# Patient Record
Sex: FEMALE | Race: BLACK OR AFRICAN AMERICAN | NOT HISPANIC OR LATINO | URBAN - METROPOLITAN AREA
[De-identification: names, ages, dates, MRNs, and addresses within clinical notes are randomized per-mention and may not be internally consistent; named-entity substitution may affect disease eponyms.]

---

## 2019-09-01 ENCOUNTER — OUTPATIENT (OUTPATIENT)
Dept: OUTPATIENT SERVICES | Facility: HOSPITAL | Age: 79
LOS: 1 days | End: 2019-09-01

## 2019-09-01 DIAGNOSIS — Z98.49 CATARACT EXTRACTION STATUS, UNSPECIFIED EYE: Chronic | ICD-10-CM

## 2019-09-26 ENCOUNTER — TRANSCRIPTION ENCOUNTER (OUTPATIENT)
Age: 79
End: 2019-09-26

## 2019-09-26 ENCOUNTER — INPATIENT (INPATIENT)
Facility: HOSPITAL | Age: 79
LOS: 17 days | Discharge: NURSING, REHAB OR LONG-TERM CARE | End: 2019-10-14
Attending: STUDENT IN AN ORGANIZED HEALTH CARE EDUCATION/TRAINING PROGRAM | Admitting: STUDENT IN AN ORGANIZED HEALTH CARE EDUCATION/TRAINING PROGRAM
Payer: MEDICARE

## 2019-09-26 VITALS
DIASTOLIC BLOOD PRESSURE: 56 MMHG | TEMPERATURE: 98 F | HEART RATE: 67 BPM | RESPIRATION RATE: 16 BRPM | OXYGEN SATURATION: 100 % | SYSTOLIC BLOOD PRESSURE: 178 MMHG

## 2019-09-26 DIAGNOSIS — Z98.49 CATARACT EXTRACTION STATUS, UNSPECIFIED EYE: Chronic | ICD-10-CM

## 2019-09-26 LAB
ALBUMIN SERPL ELPH-MCNC: 3.8 G/DL — SIGNIFICANT CHANGE UP (ref 3.3–5)
ALBUMIN SERPL ELPH-MCNC: 4.5 G/DL — SIGNIFICANT CHANGE UP (ref 3.3–5)
ALP SERPL-CCNC: 82 U/L — SIGNIFICANT CHANGE UP (ref 40–120)
ALP SERPL-CCNC: 97 U/L — SIGNIFICANT CHANGE UP (ref 40–120)
ALT FLD-CCNC: 16 U/L — SIGNIFICANT CHANGE UP (ref 4–33)
ALT FLD-CCNC: 19 U/L — SIGNIFICANT CHANGE UP (ref 4–33)
ANION GAP SERPL CALC-SCNC: 12 MMO/L — SIGNIFICANT CHANGE UP (ref 7–14)
ANION GAP SERPL CALC-SCNC: 9 MMO/L — SIGNIFICANT CHANGE UP (ref 7–14)
APTT BLD: 31.3 SEC — SIGNIFICANT CHANGE UP (ref 27.5–36.3)
AST SERPL-CCNC: 23 U/L — SIGNIFICANT CHANGE UP (ref 4–32)
AST SERPL-CCNC: 29 U/L — SIGNIFICANT CHANGE UP (ref 4–32)
BASOPHILS # BLD AUTO: 0.02 K/UL — SIGNIFICANT CHANGE UP (ref 0–0.2)
BASOPHILS NFR BLD AUTO: 0.5 % — SIGNIFICANT CHANGE UP (ref 0–2)
BILIRUB SERPL-MCNC: 0.3 MG/DL — SIGNIFICANT CHANGE UP (ref 0.2–1.2)
BILIRUB SERPL-MCNC: 0.4 MG/DL — SIGNIFICANT CHANGE UP (ref 0.2–1.2)
BUN SERPL-MCNC: 7 MG/DL — SIGNIFICANT CHANGE UP (ref 7–23)
BUN SERPL-MCNC: 7 MG/DL — SIGNIFICANT CHANGE UP (ref 7–23)
CALCIUM SERPL-MCNC: 10.3 MG/DL — SIGNIFICANT CHANGE UP (ref 8.4–10.5)
CALCIUM SERPL-MCNC: 9.4 MG/DL — SIGNIFICANT CHANGE UP (ref 8.4–10.5)
CHLORIDE SERPL-SCNC: 95 MMOL/L — LOW (ref 98–107)
CHLORIDE SERPL-SCNC: 97 MMOL/L — LOW (ref 98–107)
CO2 SERPL-SCNC: 27 MMOL/L — SIGNIFICANT CHANGE UP (ref 22–31)
CO2 SERPL-SCNC: 29 MMOL/L — SIGNIFICANT CHANGE UP (ref 22–31)
CREAT SERPL-MCNC: 0.53 MG/DL — SIGNIFICANT CHANGE UP (ref 0.5–1.3)
CREAT SERPL-MCNC: 0.58 MG/DL — SIGNIFICANT CHANGE UP (ref 0.5–1.3)
EOSINOPHIL # BLD AUTO: 0.02 K/UL — SIGNIFICANT CHANGE UP (ref 0–0.5)
EOSINOPHIL NFR BLD AUTO: 0.5 % — SIGNIFICANT CHANGE UP (ref 0–6)
GLUCOSE SERPL-MCNC: 102 MG/DL — HIGH (ref 70–99)
GLUCOSE SERPL-MCNC: 109 MG/DL — HIGH (ref 70–99)
HCT VFR BLD CALC: 40.7 % — SIGNIFICANT CHANGE UP (ref 34.5–45)
HGB BLD-MCNC: 12.9 G/DL — SIGNIFICANT CHANGE UP (ref 11.5–15.5)
IMM GRANULOCYTES NFR BLD AUTO: 0 % — SIGNIFICANT CHANGE UP (ref 0–1.5)
INR BLD: 1.12 — SIGNIFICANT CHANGE UP (ref 0.88–1.17)
LYMPHOCYTES # BLD AUTO: 1.75 K/UL — SIGNIFICANT CHANGE UP (ref 1–3.3)
LYMPHOCYTES # BLD AUTO: 43.8 % — SIGNIFICANT CHANGE UP (ref 13–44)
MCHC RBC-ENTMCNC: 26.5 PG — LOW (ref 27–34)
MCHC RBC-ENTMCNC: 31.7 % — LOW (ref 32–36)
MCV RBC AUTO: 83.6 FL — SIGNIFICANT CHANGE UP (ref 80–100)
MONOCYTES # BLD AUTO: 0.28 K/UL — SIGNIFICANT CHANGE UP (ref 0–0.9)
MONOCYTES NFR BLD AUTO: 7 % — SIGNIFICANT CHANGE UP (ref 2–14)
NEUTROPHILS # BLD AUTO: 1.93 K/UL — SIGNIFICANT CHANGE UP (ref 1.8–7.4)
NEUTROPHILS NFR BLD AUTO: 48.2 % — SIGNIFICANT CHANGE UP (ref 43–77)
NRBC # FLD: 0.02 K/UL — SIGNIFICANT CHANGE UP (ref 0–0)
PLATELET # BLD AUTO: 141 K/UL — LOW (ref 150–400)
PMV BLD: 10.6 FL — SIGNIFICANT CHANGE UP (ref 7–13)
POTASSIUM SERPL-MCNC: 3.7 MMOL/L — SIGNIFICANT CHANGE UP (ref 3.5–5.3)
POTASSIUM SERPL-MCNC: 4.6 MMOL/L — SIGNIFICANT CHANGE UP (ref 3.5–5.3)
POTASSIUM SERPL-SCNC: 3.7 MMOL/L — SIGNIFICANT CHANGE UP (ref 3.5–5.3)
POTASSIUM SERPL-SCNC: 4.6 MMOL/L — SIGNIFICANT CHANGE UP (ref 3.5–5.3)
PROT SERPL-MCNC: 10 G/DL — HIGH (ref 6–8.3)
PROT SERPL-MCNC: 8.7 G/DL — HIGH (ref 6–8.3)
PROTHROM AB SERPL-ACNC: 12.5 SEC — SIGNIFICANT CHANGE UP (ref 9.8–13.1)
RBC # BLD: 4.87 M/UL — SIGNIFICANT CHANGE UP (ref 3.8–5.2)
RBC # FLD: 13.4 % — SIGNIFICANT CHANGE UP (ref 10.3–14.5)
SODIUM SERPL-SCNC: 134 MMOL/L — LOW (ref 135–145)
SODIUM SERPL-SCNC: 135 MMOL/L — SIGNIFICANT CHANGE UP (ref 135–145)
WBC # BLD: 4 K/UL — SIGNIFICANT CHANGE UP (ref 3.8–10.5)
WBC # FLD AUTO: 4 K/UL — SIGNIFICANT CHANGE UP (ref 3.8–10.5)

## 2019-09-26 NOTE — ED PROVIDER NOTE - ATTENDING CONTRIBUTION TO CARE
The patient seen and examined  The patient presents with large rectal prolapse    I, Luis Panchal, performed the initial face to face bedside interview with this patient regarding history of present illness, review of symptoms and relevant past medical, social and family history.  I completed an independent physical examination.  I was the initial provider who evaluated this patient. I have signed out the follow up of any pending tests (i.e. labs, radiological studies) to the resident.  I have communicated the patient’s plan of care and disposition with the resident.

## 2019-09-26 NOTE — ED ADULT NURSE NOTE - NSIMPLEMENTINTERV_GEN_ALL_ED
Implemented All Fall Risk Interventions:  [place] to call system. Call bell, personal items and telephone within reach. Instruct patient to call for assistance. Room bathroom lighting operational. Non-slip footwear when patient is off stretcher. Physically safe environment: no spills, clutter or unnecessary equipment. Stretcher in lowest position, wheels locked, appropriate side rails in place. Provide visual cue, wrist band, yellow gown, etc. Monitor gait and stability. Monitor for mental status changes and reorient to person, place, and time. Review medications for side effects contributing to fall risk. Reinforce activity limits and safety measures with patient and family.

## 2019-09-26 NOTE — ED PROVIDER NOTE - CLINICAL SUMMARY MEDICAL DECISION MAKING FREE TEXT BOX
78F w/ rectal prolapse, does not appear ischemic, no abdominal pain or tenderness, no systemic symptoms. Patient denies vaginal bleeding

## 2019-09-26 NOTE — ED PROVIDER NOTE - NS ED ROS FT
General: denies fever, chills  HENT: denies nasal congestion, rhinorrhea  Eyes: denies visual changes, blurred vision  CV: denies chest pain, palpitations  Resp: denies difficulty breathing, cough  Abdominal: denies nausea, vomiting, diarrhea, + constipation   MSK: denies muscle aches, leg swelling  Neuro: denies headaches, numbness, tingling  Skin: denies rashes, bruises

## 2019-09-26 NOTE — ED PROVIDER NOTE - OBJECTIVE STATEMENT
78F CVA, anemia, depression, dm, CHF, HTN, lupus presents w/ mass in the rectum after straining during constipation. States she has been constipated for 3 days. States she was straining when she felt a mass in the rectum. Denies fevers, chills, n/v/d, abdominal pain, chest pain, SOB. Patient denies vaginal bleeding, reporting she only has some mild bleeding from her mass

## 2019-09-26 NOTE — ED ADULT NURSE NOTE - OBJECTIVE STATEMENT
mikaela rn: patient arrives a&ox2-3 to room 17 c/o "some bleeding from down there," patient is a poor historian, and is unable to articulate whether bleeding is coming from vagina or rectum. patient states that this has been going on "for a day or two," however is unable to give exact details. patient denies any complaints of pain or discomfort, abdomen is soft and non tender, no obvious large amounts of bleeding coming from rectum, scant blood observed to diaper. vital signs are stable, respirations are even and unlabored. 22g ivsl placed all pending labs drawn and sent, nad noted, bed in lowest position with call bell inreach, primary RN AM made aware of patient status

## 2019-09-26 NOTE — ED PROVIDER NOTE - PMH
Acute Ischemic Right KENY Stroke    Anemia    Depression    Diabetes    Diastolic heart failure    H/O: Hypertension    History of Leukemia    Hypercholesteremia    Lupus

## 2019-09-27 DIAGNOSIS — K62.3 RECTAL PROLAPSE: ICD-10-CM

## 2019-09-27 LAB
ANION GAP SERPL CALC-SCNC: 10 MMO/L — SIGNIFICANT CHANGE UP (ref 7–14)
ANTIBODY ID 1_1: SIGNIFICANT CHANGE UP
BLD GP AB SCN SERPL QL: POSITIVE — SIGNIFICANT CHANGE UP
BLD GP AB SCN SERPL QL: POSITIVE — SIGNIFICANT CHANGE UP
BUN SERPL-MCNC: 7 MG/DL — SIGNIFICANT CHANGE UP (ref 7–23)
CALCIUM SERPL-MCNC: 9.5 MG/DL — SIGNIFICANT CHANGE UP (ref 8.4–10.5)
CHLORIDE SERPL-SCNC: 97 MMOL/L — LOW (ref 98–107)
CO2 SERPL-SCNC: 29 MMOL/L — SIGNIFICANT CHANGE UP (ref 22–31)
CREAT SERPL-MCNC: 0.54 MG/DL — SIGNIFICANT CHANGE UP (ref 0.5–1.3)
DAT C3-SP REAG RBC QL: POSITIVE — SIGNIFICANT CHANGE UP
DAT POLY-SP REAG RBC QL: POSITIVE — SIGNIFICANT CHANGE UP
DIRECT COOMBS IGG: POSITIVE — SIGNIFICANT CHANGE UP
ELUATE ANTIBODY 1: SIGNIFICANT CHANGE UP
GLUCOSE SERPL-MCNC: 86 MG/DL — SIGNIFICANT CHANGE UP (ref 70–99)
HCT VFR BLD CALC: 41.3 % — SIGNIFICANT CHANGE UP (ref 34.5–45)
HGB BLD-MCNC: 12.9 G/DL — SIGNIFICANT CHANGE UP (ref 11.5–15.5)
MAGNESIUM SERPL-MCNC: 2 MG/DL — SIGNIFICANT CHANGE UP (ref 1.6–2.6)
MCHC RBC-ENTMCNC: 26.7 PG — LOW (ref 27–34)
MCHC RBC-ENTMCNC: 31.2 % — LOW (ref 32–36)
MCV RBC AUTO: 85.5 FL — SIGNIFICANT CHANGE UP (ref 80–100)
NRBC # FLD: 0 K/UL — SIGNIFICANT CHANGE UP (ref 0–0)
PHOSPHATE SERPL-MCNC: 3.2 MG/DL — SIGNIFICANT CHANGE UP (ref 2.5–4.5)
PLATELET # BLD AUTO: 161 K/UL — SIGNIFICANT CHANGE UP (ref 150–400)
PMV BLD: 10.8 FL — SIGNIFICANT CHANGE UP (ref 7–13)
POTASSIUM SERPL-MCNC: 4 MMOL/L — SIGNIFICANT CHANGE UP (ref 3.5–5.3)
POTASSIUM SERPL-SCNC: 4 MMOL/L — SIGNIFICANT CHANGE UP (ref 3.5–5.3)
RBC # BLD: 4.83 M/UL — SIGNIFICANT CHANGE UP (ref 3.8–5.2)
RBC # FLD: 13.4 % — SIGNIFICANT CHANGE UP (ref 10.3–14.5)
RH IG SCN BLD-IMP: POSITIVE — SIGNIFICANT CHANGE UP
SODIUM SERPL-SCNC: 136 MMOL/L — SIGNIFICANT CHANGE UP (ref 135–145)
WBC # BLD: 6.02 K/UL — SIGNIFICANT CHANGE UP (ref 3.8–10.5)
WBC # FLD AUTO: 6.02 K/UL — SIGNIFICANT CHANGE UP (ref 3.8–10.5)

## 2019-09-27 PROCEDURE — 86077 PHYS BLOOD BANK SERV XMATCH: CPT

## 2019-09-27 PROCEDURE — 45900 REDUCTION OF RECTAL PROLAPSE: CPT

## 2019-09-27 RX ORDER — FENTANYL CITRATE 50 UG/ML
25 INJECTION INTRAVENOUS
Refills: 0 | Status: DISCONTINUED | OUTPATIENT
Start: 2019-09-27 | End: 2019-09-27

## 2019-09-27 RX ORDER — SODIUM CHLORIDE 9 MG/ML
1000 INJECTION, SOLUTION INTRAVENOUS
Refills: 0 | Status: DISCONTINUED | OUTPATIENT
Start: 2019-09-27 | End: 2019-09-27

## 2019-09-27 RX ORDER — INFLUENZA VIRUS VACCINE 15; 15; 15; 15 UG/.5ML; UG/.5ML; UG/.5ML; UG/.5ML
0.5 SUSPENSION INTRAMUSCULAR ONCE
Refills: 0 | Status: DISCONTINUED | OUTPATIENT
Start: 2019-09-27 | End: 2019-10-14

## 2019-09-27 RX ORDER — BENZOCAINE AND MENTHOL 5; 1 G/100ML; G/100ML
1 LIQUID ORAL ONCE
Refills: 0 | Status: COMPLETED | OUTPATIENT
Start: 2019-09-27 | End: 2019-09-27

## 2019-09-27 RX ORDER — ONDANSETRON 8 MG/1
4 TABLET, FILM COATED ORAL ONCE
Refills: 0 | Status: DISCONTINUED | OUTPATIENT
Start: 2019-09-27 | End: 2019-09-27

## 2019-09-27 RX ORDER — METOPROLOL TARTRATE 50 MG
5 TABLET ORAL EVERY 6 HOURS
Refills: 0 | Status: DISCONTINUED | OUTPATIENT
Start: 2019-09-27 | End: 2019-09-27

## 2019-09-27 RX ORDER — MORPHINE SULFATE 50 MG/1
2 CAPSULE, EXTENDED RELEASE ORAL EVERY 4 HOURS
Refills: 0 | Status: DISCONTINUED | OUTPATIENT
Start: 2019-09-27 | End: 2019-10-04

## 2019-09-27 RX ORDER — ACETAMINOPHEN 500 MG
650 TABLET ORAL EVERY 6 HOURS
Refills: 0 | Status: DISCONTINUED | OUTPATIENT
Start: 2019-09-27 | End: 2019-10-04

## 2019-09-27 RX ORDER — METOPROLOL TARTRATE 50 MG
5 TABLET ORAL EVERY 6 HOURS
Refills: 0 | Status: DISCONTINUED | OUTPATIENT
Start: 2019-09-27 | End: 2019-09-28

## 2019-09-27 RX ORDER — HEPARIN SODIUM 5000 [USP'U]/ML
5000 INJECTION INTRAVENOUS; SUBCUTANEOUS EVERY 8 HOURS
Refills: 0 | Status: DISCONTINUED | OUTPATIENT
Start: 2019-09-27 | End: 2019-10-04

## 2019-09-27 RX ORDER — ACETAMINOPHEN 500 MG
650 TABLET ORAL EVERY 6 HOURS
Refills: 0 | Status: DISCONTINUED | OUTPATIENT
Start: 2019-09-27 | End: 2019-09-27

## 2019-09-27 RX ORDER — DEXTROSE MONOHYDRATE, SODIUM CHLORIDE, AND POTASSIUM CHLORIDE 50; .745; 4.5 G/1000ML; G/1000ML; G/1000ML
1000 INJECTION, SOLUTION INTRAVENOUS
Refills: 0 | Status: DISCONTINUED | OUTPATIENT
Start: 2019-09-27 | End: 2019-09-30

## 2019-09-27 RX ORDER — MORPHINE SULFATE 50 MG/1
4 CAPSULE, EXTENDED RELEASE ORAL ONCE
Refills: 0 | Status: DISCONTINUED | OUTPATIENT
Start: 2019-09-27 | End: 2019-09-27

## 2019-09-27 RX ADMIN — MORPHINE SULFATE 4 MILLIGRAM(S): 50 CAPSULE, EXTENDED RELEASE ORAL at 00:42

## 2019-09-27 RX ADMIN — Medication 5 MILLIGRAM(S): at 17:21

## 2019-09-27 RX ADMIN — Medication 5 MILLIGRAM(S): at 23:20

## 2019-09-27 RX ADMIN — Medication 5 MILLIGRAM(S): at 13:18

## 2019-09-27 RX ADMIN — SODIUM CHLORIDE 100 MILLILITER(S): 9 INJECTION, SOLUTION INTRAVENOUS at 14:18

## 2019-09-27 RX ADMIN — HEPARIN SODIUM 5000 UNIT(S): 5000 INJECTION INTRAVENOUS; SUBCUTANEOUS at 14:18

## 2019-09-27 RX ADMIN — HEPARIN SODIUM 5000 UNIT(S): 5000 INJECTION INTRAVENOUS; SUBCUTANEOUS at 22:07

## 2019-09-27 RX ADMIN — HEPARIN SODIUM 5000 UNIT(S): 5000 INJECTION INTRAVENOUS; SUBCUTANEOUS at 06:19

## 2019-09-27 NOTE — BRIEF OPERATIVE NOTE - OPERATION/FINDINGS
full thickness rectal prolapse w/ associated congestion but viable appearing mucosa, once under general anesthesia able to reduce prolapse with gradual, constant gentle pressure, rigid proctoscopy 30minutes later with all mucosa looking viable and one area of irritated mucosa noted in the right lateral region

## 2019-09-27 NOTE — H&P ADULT - ASSESSMENT
78F w/ PMH of Lupus (on long-term prednisone), HTN, Anemia, HLD, Osteoporosis, DM, Gout, Depression, CVA, hx of reducible rectal prolapse now presents with non-reducible rectal prolapse. 78F w/ PMH of Lupus (on long-term prednisone), HTN, Anemia, HLD, Osteoporosis, DM, Gout, Depression, CVA, hx of reducible rectal prolapse now presents with non-reducible rectal prolapse.    - Admit to Dr. Sullivan  - NPO/IVF  - Pt is booked and consented for ex lap, Altemeier colostomy   - Pain control   - Stress dose of steroids (chronically on steroids for decades)   - DVT ppx      Discussed with colorectal fellow Dr. Rossy Hong PGY2  Team A [id number] 78F w/ PMH of Lupus (on long-term prednisone), HTN, Anemia, HLD, Osteoporosis, DM, Gout, Depression, CVA, hx of reducible rectal prolapse now presents with non-reducible rectal prolapse.    - Admit to Dr. Sullivan  - NPO/IVF  - Pt is booked and consented for ex lap, Altemeier colostomy   - Pain control   - Stress dose of steroids (chronically on steroids for decades)   - DVT ppx      Discussed with colorectal fellow Dr.Egunsola Rossy Hong PGY2  Team A [id number] 78F w/ PMH of Lupus (on long-term prednisone), HTN, Anemia, HLD, Osteoporosis, DM, Gout, Depression, CVA, hx of reducible rectal prolapse now presents with non-reducible rectal prolapse.    - Admit to Dr. Sullivan  - NPO/IVF  - Pt is booked and consented for ex lap, Altemeier colostomy   - Pain control   - DVT ppx      Discussed with colorectal fellow Dr.Egunsola Rossy Hong PGY2  Team A [id number]

## 2019-09-27 NOTE — H&P ADULT - NSICDXPASTMEDICALHX_GEN_ALL_CORE_FT
PAST MEDICAL HISTORY:  Acute Ischemic Right KENY Stroke     Anemia     Depression     Diabetes     Diastolic heart failure     H/O: Hypertension     History of Leukemia     Hypercholesteremia     Lupus

## 2019-09-27 NOTE — ED ADULT NURSE REASSESSMENT NOTE - NS ED NURSE REASSESS COMMENT FT1
Report given to OR JANNET Celaya. Pt belongings to go with the pt  at bedside. Consent confirmed by MD and witness at bedside. Will continue to monitor.

## 2019-09-27 NOTE — H&P ADULT - NSHPPHYSICALEXAM_GEN_ALL_CORE
GEN: NAD  Pulm: unlabored breathing on RA  CV: RRR  Adb: soft, NTND  Rectum: prolapsed rectum hyperemic, non reducible

## 2019-09-27 NOTE — H&P ADULT - NSHPLABSRESULTS_GEN_ALL_CORE
12.9   4.00  )-----------( 141      ( 26 Sep 2019 21:01 )             40.7       09-26    135  |  97<L>  |  7   ----------------------------<  102<H>  3.7   |  29  |  0.53    Ca    9.4      26 Sep 2019 22:25    TPro  8.7<H>  /  Alb  3.8  /  TBili  0.3  /  DBili  x   /  AST  23  /  ALT  16  /  AlkPhos  82  09-26                  PT/INR - ( 26 Sep 2019 22:25 )   PT: 12.5 SEC;   INR: 1.12          PTT - ( 26 Sep 2019 22:25 )  PTT:31.3 SEC    Lactate Trend

## 2019-09-27 NOTE — H&P ADULT - NSICDXPASTSURGICALHX_GEN_ALL_CORE_FT
PAST SURGICAL HISTORY:  H/O: Hysterectomy     Status post cataract extraction and insertion of intraocular lens, unspecified laterality

## 2019-09-27 NOTE — PHYSICAL THERAPY INITIAL EVALUATION ADULT - PERTINENT HX OF CURRENT PROBLEM, REHAB EVAL
78 year old female w/ PMH of Lupus (on long-term prednisone), HTN, Anemia, HLD, Osteoporosis, DM, Gout, Depression, CVA, hx of reducible rectal prolapse now presents with non-reducible rectal prolapse. Patient reports that she tried having BM and rectum prolapsed and she couldn't reduce it.

## 2019-09-27 NOTE — ED ADULT NURSE REASSESSMENT NOTE - NS ED NURSE REASSESS COMMENT FT1
Pt aaox2. Vital signs reassessed as noted. Pt states rectal pain; MD made aware, pt medicated as per MD order. Surg at bedside. Will continue to monitor.

## 2019-09-27 NOTE — PHYSICAL THERAPY INITIAL EVALUATION ADULT - PLANNED THERAPY INTERVENTIONS, PT EVAL
gait training/strengthening/transfer training/Patient left supine in bed in NAD, call bell in reach, all lines intact./balance training/bed mobility training

## 2019-09-27 NOTE — H&P ADULT - NSICDXFAMILYHX_GEN_ALL_CORE_FT
FAMILY HISTORY:  Mother  Still living? No  Family history of cardiomyopathy, Age at diagnosis: Age Unknown

## 2019-09-27 NOTE — H&P ADULT - ATTENDING COMMENTS
Incarcerated rectal prolapse w/ signs of ischemia  -OR for reduction of prolapse and likely proctectomy w/ colostomy  -Given pts comorbidities and longterm steroid use, any anastomosis would be high risk  -Risks and benefits discussed w/ pt and family member Incarcerated rectal prolapse   -OR for reduction of prolapse and likely proctectomy w/ colostomy  -If easy reduction, may observe mucosa endoscopically   -Given pts comorbidities and longterm steroid use, any anastomosis would be high risk  -Risks and benefits discussed w/ pt and family member

## 2019-09-27 NOTE — H&P ADULT - HISTORY OF PRESENT ILLNESS
78F w/ PMH of Lupus (on long-term prednisone), HTN, Anemia, HLD, Osteoporosis, DM, Gout, Depression, CVA, hx of reducible rectal prolapse now presents with non-reducible rectal prolapse. PT reports that this morning she tried having BM and rectum prolapsed and she couldn't reduce it. Denies fevers, chills, N/V, abdominal pain. 78F w/ PMH of Lupus (on long-term prednisone), HTN, Anemia, HLD, Osteoporosis, DM, Gout, Depression, CVA, hx of reducible rectal prolapse now presents with non-reducible rectal prolapse. PT reports that this morning she tried having BM and rectum prolapsed and she couldn't reduce it. Denies fevers, chills, N/V, abdominal pain.     In ED, attempted bedside reduction however unsuccessful.

## 2019-09-27 NOTE — PROGRESS NOTE ADULT - SUBJECTIVE AND OBJECTIVE BOX
A Team Surgery Post Op Note     Patient seen and evaluated in PACU. Post-operative pain is well-controlled. Patient denies headache, fever, chills, chest pain, SOB, nausea and vomiting.       Vital Signs Last 24 Hrs  T(C): 36.4 (27 Sep 2019 07:57), Max: 36.8 (27 Sep 2019 00:30)  T(F): 97.5 (27 Sep 2019 07:57), Max: 98.2 (27 Sep 2019 00:30)  HR: 59 (27 Sep 2019 07:57) (59 - 78)  BP: 121/46 (27 Sep 2019 07:57) (121/46 - 178/56)  BP(mean): 63 (27 Sep 2019 06:30) (63 - 82)  RR: 16 (27 Sep 2019 07:57) (11 - 16)  SpO2: 94% (27 Sep 2019 07:57) (94% - 100%)    I&O's Detail    26 Sep 2019 07:01  -  27 Sep 2019 07:00  --------------------------------------------------------  IN:    lactated ringers.: 250 mL  Total IN: 250 mL    OUT:    Indwelling Catheter - Urethral: 240 mL  Total OUT: 240 mL    Total NET: 10 mL          PHYSICAL EXAM:  General: NAD  Neuro: AOx3, no focal deficits  Respiratory:  Lungs CTA, B/L, no rales , no wheezing, no rhonchi.  Cardiovascular:  S1, S2, Reg rate  Gastrointestinal: Abdomen soft, non distended, + BS, non tender  Rectal: minimal bloody drainage  Extremities: No edema, no calf or thigh tenderness. soft compartments.         LABS:                        12.9   4.00  )-----------( 141      ( 26 Sep 2019 21:01 )             40.7     09-26    135  |  97<L>  |  7   ----------------------------<  102<H>  3.7   |  29  |  0.53    Ca    9.4      26 Sep 2019 22:25    TPro  8.7<H>  /  Alb  3.8  /  TBili  0.3  /  DBili  x   /  AST  23  /  ALT  16  /  AlkPhos  82  09-26    PT/INR - ( 26 Sep 2019 22:25 )   PT: 12.5 SEC;   INR: 1.12          PTT - ( 26 Sep 2019 22:25 )  PTT:31.3 SEC

## 2019-09-27 NOTE — PROGRESS NOTE ADULT - ASSESSMENT
A/P: 78F w/ PMH of Lupus (on long-term prednisone), HTN, Anemia, HLD, Osteoporosis, DM, Gout, Depression, CVA, now s/p successful reduction of rectal prolapse under anesthesia    - pain control PRN   - NPO for today, likely clears tomorrow   - follow-up rheumatology for steroid dosing and recommendations regarding lupus meds.   - OR next week for rectopexy   - medical clearance   - monitor for recurrence of prolapse.         A Team Surgery   [id number]

## 2019-09-28 DIAGNOSIS — Z01.818 ENCOUNTER FOR OTHER PREPROCEDURAL EXAMINATION: ICD-10-CM

## 2019-09-28 DIAGNOSIS — Z86.79 PERSONAL HISTORY OF OTHER DISEASES OF THE CIRCULATORY SYSTEM: ICD-10-CM

## 2019-09-28 DIAGNOSIS — E11.9 TYPE 2 DIABETES MELLITUS WITHOUT COMPLICATIONS: ICD-10-CM

## 2019-09-28 DIAGNOSIS — I50.30 UNSPECIFIED DIASTOLIC (CONGESTIVE) HEART FAILURE: ICD-10-CM

## 2019-09-28 LAB — HBA1C BLD-MCNC: 6 % — HIGH (ref 4–5.6)

## 2019-09-28 PROCEDURE — 93306 TTE W/DOPPLER COMPLETE: CPT | Mod: 26

## 2019-09-28 RX ORDER — AMLODIPINE BESYLATE 2.5 MG/1
5 TABLET ORAL DAILY
Refills: 0 | Status: DISCONTINUED | OUTPATIENT
Start: 2019-09-28 | End: 2019-10-04

## 2019-09-28 RX ORDER — LOSARTAN POTASSIUM 100 MG/1
100 TABLET, FILM COATED ORAL DAILY
Refills: 0 | Status: DISCONTINUED | OUTPATIENT
Start: 2019-09-28 | End: 2019-10-04

## 2019-09-28 RX ADMIN — HEPARIN SODIUM 5000 UNIT(S): 5000 INJECTION INTRAVENOUS; SUBCUTANEOUS at 21:29

## 2019-09-28 RX ADMIN — Medication 5 MILLIGRAM(S): at 06:07

## 2019-09-28 RX ADMIN — HEPARIN SODIUM 5000 UNIT(S): 5000 INJECTION INTRAVENOUS; SUBCUTANEOUS at 06:07

## 2019-09-28 RX ADMIN — DEXTROSE MONOHYDRATE, SODIUM CHLORIDE, AND POTASSIUM CHLORIDE 50 MILLILITER(S): 50; .745; 4.5 INJECTION, SOLUTION INTRAVENOUS at 13:10

## 2019-09-28 RX ADMIN — HEPARIN SODIUM 5000 UNIT(S): 5000 INJECTION INTRAVENOUS; SUBCUTANEOUS at 13:10

## 2019-09-28 RX ADMIN — Medication 5 MILLIGRAM(S): at 13:11

## 2019-09-28 NOTE — CONSULT NOTE ADULT - ASSESSMENT
78F w/ PMH of Lupus (on long-term prednisone), HTN, Anemia, HLD, Osteoporosis, DM, Gout, Depression, CVA, hx of reducible rectal prolapse now presents with non-reducible rectal prolapse

## 2019-09-28 NOTE — PROGRESS NOTE ADULT - SUBJECTIVE AND OBJECTIVE BOX
ANESTHESIA POSTOP CHECK    78y Female POSTOP DAY 1     Vital Signs Last 24 Hrs  T(C): 36.8 (28 Sep 2019 09:29), Max: 37.3 (27 Sep 2019 17:40)  T(F): 98.3 (28 Sep 2019 09:29), Max: 99.1 (27 Sep 2019 17:40)  HR: 66 (28 Sep 2019 09:29) (66 - 93)  BP: 157/68 (28 Sep 2019 09:29) (138/56 - 157/68)  BP(mean): --  RR: 18 (28 Sep 2019 09:29) (16 - 18)  SpO2: 97% (28 Sep 2019 09:29) (94% - 98%)  I&O's Summary    27 Sep 2019 07:01  -  28 Sep 2019 07:00  --------------------------------------------------------  IN: 2400 mL / OUT: 1650 mL / NET: 750 mL    28 Sep 2019 07:01  -  28 Sep 2019 10:40  --------------------------------------------------------  IN: 0 mL / OUT: 600 mL / NET: -600 mL    NO APPARENT ANESTHESIA COMPLICATIONS

## 2019-09-28 NOTE — CONSULT NOTE ADULT - PROBLEM SELECTOR RECOMMENDATION 2
euvolemic   no intervention required at this time   continue to monitor  The patient has had an echocardiogram and coronary angiogram ~ 8 years ago which were both normal  it is unlikely she will have developed CAD

## 2019-09-28 NOTE — PROGRESS NOTE ADULT - ASSESSMENT
A/P: 78F w/ PMH of Lupus (on long-term prednisone), HTN, Anemia, HLD, Osteoporosis, DM, Gout, Depression, CVA, now s/p successful reduction of rectal prolapse under anesthesia    - pain control PRN   - NPO for today, likely clears tomorrow   - follow-up rheumatology for steroid dosing and recommendations regarding lupus meds.   - OR next week for rectopexy   - medical clearance   - monitor for recurrence of prolapse.         A Team Surgery   [id number] A/P: 78F w/ PMH of Lupus (on long-term prednisone), HTN, Anemia, HLD, Osteoporosis, DM, Gout, Depression, CVA, now s/p successful reduction of rectal prolapse under anesthesia    - pain control PRN   - Adv. to [facility]D  - Spoke w/ pt.'s Rheumatologist yesterday, who confirmed that pt. is on plaquenil 200 mg daily, and also said they had no concerns for proceeding w/ the planned rectopexy this upcoming week  - OR next week for rectopexy   - medical clearance, called hospitalist Dr. Bishop to evaluate pt., awaiting recommendations  - monitor for recurrence of prolapse.       A Team Surgery   [id number]

## 2019-09-28 NOTE — PROGRESS NOTE ADULT - SUBJECTIVE AND OBJECTIVE BOX
A Team Surgery Post Op Note     Patient seen and evaluated in PACU. Post-operative pain is well-controlled. Patient denies headache, fever, chills, chest pain, SOB, nausea and vomiting.       Vital Signs Last 24 Hrs  T(C): 36.4 (27 Sep 2019 07:57), Max: 36.8 (27 Sep 2019 00:30)  T(F): 97.5 (27 Sep 2019 07:57), Max: 98.2 (27 Sep 2019 00:30)  HR: 59 (27 Sep 2019 07:57) (59 - 78)  BP: 121/46 (27 Sep 2019 07:57) (121/46 - 178/56)  BP(mean): 63 (27 Sep 2019 06:30) (63 - 82)  RR: 16 (27 Sep 2019 07:57) (11 - 16)  SpO2: 94% (27 Sep 2019 07:57) (94% - 100%)    I&O's Detail    26 Sep 2019 07:01  -  27 Sep 2019 07:00  --------------------------------------------------------  IN:    lactated ringers.: 250 mL  Total IN: 250 mL    OUT:    Indwelling Catheter - Urethral: 240 mL  Total OUT: 240 mL    Total NET: 10 mL          PHYSICAL EXAM:  General: NAD  Neuro: AOx3, no focal deficits  Respiratory:  Lungs CTA, B/L, no rales , no wheezing, no rhonchi.  Cardiovascular:  S1, S2, Reg rate  Gastrointestinal: Abdomen soft, non distended, + BS, non tender  Rectal: minimal bloody drainage  Extremities: No edema, no calf or thigh tenderness. soft compartments.         LABS:                        12.9   4.00  )-----------( 141      ( 26 Sep 2019 21:01 )             40.7     09-26    135  |  97<L>  |  7   ----------------------------<  102<H>  3.7   |  29  |  0.53    Ca    9.4      26 Sep 2019 22:25    TPro  8.7<H>  /  Alb  3.8  /  TBili  0.3  /  DBili  x   /  AST  23  /  ALT  16  /  AlkPhos  82  09-26    PT/INR - ( 26 Sep 2019 22:25 )   PT: 12.5 SEC;   INR: 1.12          PTT - ( 26 Sep 2019 22:25 )  PTT:31.3 SEC A Team Surgery Post Op Note     Patient seen and evaluated at the bedside Post-operative pain is well-controlled. Patient denies headache, fever, chills, chest pain, SOB, nausea and vomiting.       Objective:  Vital Signs Last 24 Hrs  T(C): 36.8 (28 Sep 2019 09:29), Max: 37.3 (27 Sep 2019 17:40)  T(F): 98.3 (28 Sep 2019 09:29), Max: 99.1 (27 Sep 2019 17:40)  HR: 66 (28 Sep 2019 09:29) (54 - 93)  BP: 157/68 (28 Sep 2019 09:29) (138/56 - 157/68)  BP(mean): --  RR: 18 (28 Sep 2019 09:29) (16 - 18)  SpO2: 97% (28 Sep 2019 09:29) (94% - 98%)    I&O's Detail    27 Sep 2019 07:01  -  28 Sep 2019 07:00  --------------------------------------------------------  IN:    dextrose 5% + sodium chloride 0.45% with potassium chloride 20 mEq/L: 1200 mL    lactated ringers.: 1200 mL  Total IN: 2400 mL    OUT:    Indwelling Catheter - Urethral: 1650 mL  Total OUT: 1650 mL    Total NET: 750 mL      28 Sep 2019 07:01  -  28 Sep 2019 09:55  --------------------------------------------------------  IN:  Total IN: 0 mL    OUT:    Indwelling Catheter - Urethral: 600 mL  Total OUT: 600 mL    Total NET: -600 mL      MEDICATIONS  (STANDING):  dextrose 5% + sodium chloride 0.45% with potassium chloride 20 mEq/L 1000 milliLiter(s) (50 mL/Hr) IV Continuous <Continuous>  heparin  Injectable 5000 Unit(s) SubCutaneous every 8 hours  influenza   Vaccine 0.5 milliLiter(s) IntraMuscular once  metoprolol tartrate Injectable 5 milliGRAM(s) IV Push every 6 hours    MEDICATIONS  (PRN):  acetaminophen   Tablet .. 650 milliGRAM(s) Oral every 6 hours PRN Moderate Pain (4 - 6)  morphine  - Injectable 2 milliGRAM(s) IV Push every 4 hours PRN Moderate Pain (4 - 6)                            12.9   6.02  )-----------( 161      ( 27 Sep 2019 12:05 )             41.3       09-27    136  |  97<L>  |  7   ----------------------------<  86  4.0   |  29  |  0.54    Ca    9.5      27 Sep 2019 12:05  Phos  3.2     09-27  Mg     2.0     09-27    TPro  8.7<H>  /  Alb  3.8  /  TBili  0.3  /  DBili  x   /  AST  23  /  ALT  16  /  AlkPhos  82  09-26        PHYSICAL EXAM:  General: NAD  Neuro: AOx3, no focal deficits  Respiratory:  no acute resp. distress  Cardiovascular:  S1, S2, Reg rate  Gastrointestinal: Abdomen soft, non distended, + BS, non tender  Rectal: c/d/i, no further prolapse or recurrence  Extremities: No edema, no calf or thigh tenderness. soft compartments.

## 2019-09-29 DIAGNOSIS — R64 CACHEXIA: ICD-10-CM

## 2019-09-29 DIAGNOSIS — M32.9 SYSTEMIC LUPUS ERYTHEMATOSUS, UNSPECIFIED: ICD-10-CM

## 2019-09-29 DIAGNOSIS — K62.3 RECTAL PROLAPSE: ICD-10-CM

## 2019-09-29 LAB
ANION GAP SERPL CALC-SCNC: 12 MMO/L — SIGNIFICANT CHANGE UP (ref 7–14)
BLD GP AB SCN SERPL QL: POSITIVE — SIGNIFICANT CHANGE UP
BUN SERPL-MCNC: 3 MG/DL — LOW (ref 7–23)
CALCIUM SERPL-MCNC: 9.1 MG/DL — SIGNIFICANT CHANGE UP (ref 8.4–10.5)
CHLORIDE SERPL-SCNC: 98 MMOL/L — SIGNIFICANT CHANGE UP (ref 98–107)
CO2 SERPL-SCNC: 25 MMOL/L — SIGNIFICANT CHANGE UP (ref 22–31)
CREAT SERPL-MCNC: 0.42 MG/DL — LOW (ref 0.5–1.3)
DAT C3-SP REAG RBC QL: POSITIVE — SIGNIFICANT CHANGE UP
DAT POLY-SP REAG RBC QL: POSITIVE — SIGNIFICANT CHANGE UP
DIRECT COOMBS IGG: POSITIVE — SIGNIFICANT CHANGE UP
GLUCOSE SERPL-MCNC: 160 MG/DL — HIGH (ref 70–99)
HCT VFR BLD CALC: 36.5 % — SIGNIFICANT CHANGE UP (ref 34.5–45)
HGB BLD-MCNC: 11.5 G/DL — SIGNIFICANT CHANGE UP (ref 11.5–15.5)
MCHC RBC-ENTMCNC: 26.4 PG — LOW (ref 27–34)
MCHC RBC-ENTMCNC: 31.5 % — LOW (ref 32–36)
MCV RBC AUTO: 83.7 FL — SIGNIFICANT CHANGE UP (ref 80–100)
NRBC # FLD: 0 K/UL — SIGNIFICANT CHANGE UP (ref 0–0)
PHOSPHATE SERPL-MCNC: 2.4 MG/DL — LOW (ref 2.5–4.5)
PLATELET # BLD AUTO: 131 K/UL — LOW (ref 150–400)
PMV BLD: 10.3 FL — SIGNIFICANT CHANGE UP (ref 7–13)
POTASSIUM SERPL-MCNC: 3.6 MMOL/L — SIGNIFICANT CHANGE UP (ref 3.5–5.3)
POTASSIUM SERPL-SCNC: 3.6 MMOL/L — SIGNIFICANT CHANGE UP (ref 3.5–5.3)
RBC # BLD: 4.36 M/UL — SIGNIFICANT CHANGE UP (ref 3.8–5.2)
RBC # FLD: 13.4 % — SIGNIFICANT CHANGE UP (ref 10.3–14.5)
RH IG SCN BLD-IMP: POSITIVE — SIGNIFICANT CHANGE UP
SODIUM SERPL-SCNC: 135 MMOL/L — SIGNIFICANT CHANGE UP (ref 135–145)
WBC # BLD: 5.53 K/UL — SIGNIFICANT CHANGE UP (ref 3.8–10.5)
WBC # FLD AUTO: 5.53 K/UL — SIGNIFICANT CHANGE UP (ref 3.8–10.5)

## 2019-09-29 RX ORDER — SODIUM,POTASSIUM PHOSPHATES 278-250MG
1 POWDER IN PACKET (EA) ORAL ONCE
Refills: 0 | Status: COMPLETED | OUTPATIENT
Start: 2019-09-29 | End: 2019-09-29

## 2019-09-29 RX ADMIN — AMLODIPINE BESYLATE 5 MILLIGRAM(S): 2.5 TABLET ORAL at 05:14

## 2019-09-29 RX ADMIN — HEPARIN SODIUM 5000 UNIT(S): 5000 INJECTION INTRAVENOUS; SUBCUTANEOUS at 05:15

## 2019-09-29 RX ADMIN — HEPARIN SODIUM 5000 UNIT(S): 5000 INJECTION INTRAVENOUS; SUBCUTANEOUS at 14:08

## 2019-09-29 RX ADMIN — Medication 1 PACKET(S): at 11:12

## 2019-09-29 RX ADMIN — HEPARIN SODIUM 5000 UNIT(S): 5000 INJECTION INTRAVENOUS; SUBCUTANEOUS at 22:00

## 2019-09-29 RX ADMIN — LOSARTAN POTASSIUM 100 MILLIGRAM(S): 100 TABLET, FILM COATED ORAL at 05:14

## 2019-09-29 RX ADMIN — DEXTROSE MONOHYDRATE, SODIUM CHLORIDE, AND POTASSIUM CHLORIDE 50 MILLILITER(S): 50; .745; 4.5 INJECTION, SOLUTION INTRAVENOUS at 11:12

## 2019-09-29 RX ADMIN — Medication 1 DROP(S): at 14:08

## 2019-09-29 NOTE — PROGRESS NOTE ADULT - ASSESSMENT
A/P: 78F w/ PMH of Lupus (on long-term prednisone), HTN, Anemia, HLD, Osteoporosis, DM, Gout, Depression, CVA, now s/p successful reduction of rectal prolapse under anesthesia    - pain control PRN   - Adv. to [facility]D  - Spoke w/ pt.'s Rheumatologist yesterday, who confirmed that pt. is on plaquenil 200 mg daily, and also said they had no concerns for proceeding w/ the planned rectopexy this upcoming week  - OR next week for rectopexy   - medical clearance, called hospitalist Dr. Bishop to evaluate pt., awaiting recommendations  - monitor for recurrence of prolapse.   - Dc sosa  - Repleted Na K Phos for slightly low Na K and hypophosphatemia on labs    A Team Surgery   [id number]

## 2019-09-29 NOTE — CONSULT NOTE ADULT - PROBLEM SELECTOR RECOMMENDATION 9
cont clear liquid diet  monitor gi function  will plan for colonoscopy prior to surgery either tuesday or wednesday pending clinical course
patient is clinically stable  EKG: NSR marlee LAD no acute changes  clinically euvolemic   and has already tolerated general anesthesia without any complications  patient is currently medically optimized for surgical procedure  the patient

## 2019-09-29 NOTE — CONSULT NOTE ADULT - SUBJECTIVE AND OBJECTIVE BOX
Patient is a 78y old  Female who presents with a chief complaint of Rectal prolapse (28 Sep 2019 10:40)      HPI:  78F w/ PMH of Lupus (on long-term prednisone), HTN, Anemia, HLD, Osteoporosis, DM, Gout, Depression, CVA, hx of reducible rectal prolapse now presents with non-reducible rectal prolapse. PT reports that this morning she tried having BM and rectum prolapsed and she couldn't reduce it. Denies fevers, chills, nausea, vomiting. Currently does have some rectal pain. The patient is now s/p successful reduction of prolapse in the OR under general anesthesia        PAST MEDICAL & SURGICAL HISTORY:  Diastolic heart failure  Acute Ischemic Right KENY Stroke with mild residual left hemiparesis  History of Leukemia  H/O: Hypertension  Anemia  Lupus  Hypercholesteremia  Diabetes  Depression  Status post cataract extraction and insertion of intraocular lens, unspecified laterality  H/O: Hysterectomy      Review of Systems:   CONSTITUTIONAL: No fever, weight loss, or fatigue  EYES: No eye pain, visual disturbances, or discharge  ENMT:  No difficulty hearing, tinnitus, vertigo; No sinus or throat pain  NECK: No pain or stiffness  RESPIRATORY: No cough, wheezing, chills or hemoptysis; No shortness of breath  CARDIOVASCULAR: No chest pain, palpitations, dizziness, or leg swelling  GASTROINTESTINAL: see above HPI   GENITOURINARY: No dysuria, frequency, hematuria, or incontinence  NEUROLOGICAL: No headaches, memory loss, loss of strength, numbness, or tremors  SKIN: No itching, burning, rashes, or lesions   LYMPH NODES: No enlarged glands  ENDOCRINE: No heat or cold intolerance; No hair loss  MUSCULOSKELETAL: No joint pain or swelling; No muscle, back, or extremity pain  PSYCHIATRIC: No depression, anxiety, mood swings, or difficulty sleeping  HEME/LYMPH: No easy bruising, or bleeding gums  ALLERGY AND IMMUNOLOGIC: No hives or eczema    Allergies    SULFA (Unknown)    Social History: non smoker  no IVDA  no ETOH abuse   lives with spouse      FAMILY HISTORY:  Family history of cardiomyopathy      MEDICATIONS  (STANDING):  dextrose 5% + sodium chloride 0.45% with potassium chloride 20 mEq/L 1000 milliLiter(s) (50 mL/Hr) IV Continuous <Continuous>  heparin  Injectable 5000 Unit(s) SubCutaneous every 8 hours  influenza   Vaccine 0.5 milliLiter(s) IntraMuscular once  metoprolol tartrate Injectable 5 milliGRAM(s) IV Push every 6 hours    MEDICATIONS  (PRN):  acetaminophen   Tablet .. 650 milliGRAM(s) Oral every 6 hours PRN Moderate Pain (4 - 6)  morphine  - Injectable 2 milliGRAM(s) IV Push every 4 hours PRN Moderate Pain (4 - 6)      CAPILLARY BLOOD GLUCOSE        I&O's Summary    27 Sep 2019 07:01  -  28 Sep 2019 07:00  --------------------------------------------------------  IN: 2400 mL / OUT: 1650 mL / NET: 750 mL    28 Sep 2019 07:01  -  28 Sep 2019 12:26  --------------------------------------------------------  IN: 260 mL / OUT: 600 mL / NET: -340 mL        24hrs Vital:  T(C): 36.8 (09-28-19 @ 09:29), Max: 37.3 (09-27-19 @ 17:40)  HR: 66 (09-28-19 @ 09:29) (66 - 93)  BP: 157/68 (09-28-19 @ 09:29) (138/56 - 157/68)  RR: 18 (09-28-19 @ 09:29) (16 - 18)  SpO2: 97% (09-28-19 @ 09:29) (94% - 98%)    PHYSICAL EXAM:  GENERAL: NAD, cachectic  HEAD:  Atraumatic, Normocephalic  EYES: EOMI, PERRLA, conjunctiva and sclera clear  NECK: Supple, No JVD  CHEST/LUNG: Clear to auscultation bilaterally; No wheeze  HEART: S1S2; No rubs, or gallops, no murmurs  ABDOMEN: Soft, Nontender, Nondistended; Bowel sounds present  EXTREMITIES:  + Peripheral Pulses, No clubbing or cyanosis, no edema  PSYCH: AO x 3,   NEUROLOGY: Alert, left mild hemiparesis  SKIN: No rashes or lesions    LABS:                        12.9   6.02  )-----------( 161      ( 27 Sep 2019 12:05 )             41.3     09-27    136  |  97<L>  |  7   ----------------------------<  86  4.0   |  29  |  0.54    Ca    9.5      27 Sep 2019 12:05  Phos  3.2     09-27  Mg     2.0     09-27    TPro  8.7<H>  /  Alb  3.8  /  TBili  0.3  /  DBili  x   /  AST  23  /  ALT  16  /  AlkPhos  82  09-26    PT/INR - ( 26 Sep 2019 22:25 )   PT: 12.5 SEC;   INR: 1.12          PTT - ( 26 Sep 2019 22:25 )  PTT:31.3 SEC          RADIOLOGY & ADDITIONAL TESTS:    Consultant(s) Notes Reviewed:      Care Discussed with Consultants/Other Providers:
[facility]  Bk Batista PA-C  [address] [place] [address]  [phone]      Chief Complaint:  Patient is a 78y old  Female who presents with a chief complaint of Rectal prolapse (29 Sep 2019 12:28)      HPI: 78F w/ PMH of Lupus (on long-term prednisone), HTN, Anemia, HLD, Osteoporosis, DM, Gout, Depression, CVA, hx of reducible rectal prolapse now presents with non-reducible rectal prolapse. PT reports that this morning she tried having BM and rectum prolapsed and she couldn't reduce it. Denies fevers, chills, N/V, abdominal pain.   prolapse was reduced in OR with Gen anesthesia  mucosa visualized thereafter was viable  she is doing well on cld   unsure when last colonoscopy was     Allergies:  SULFA (Unknown)  sulfa drugs (Unknown)      Medications:  acetaminophen   Tablet .. 650 milliGRAM(s) Oral every 6 hours PRN  amLODIPine   Tablet 5 milliGRAM(s) Oral daily  artificial  tears Solution 1 Drop(s) Both EYES three times a day PRN  dextrose 5% + sodium chloride 0.45% with potassium chloride 20 mEq/L 1000 milliLiter(s) IV Continuous <Continuous>  heparin  Injectable 5000 Unit(s) SubCutaneous every 8 hours  influenza   Vaccine 0.5 milliLiter(s) IntraMuscular once  losartan 100 milliGRAM(s) Oral daily  morphine  - Injectable 2 milliGRAM(s) IV Push every 4 hours PRN      PMHX/PSHX:  Diastolic heart failure  Acute Ischemic Right KENY Stroke  History of Leukemia  H/O: Hypertension  Anemia  Lupus  Hypercholesteremia  Diabetes  Depression  Status post cataract extraction and insertion of intraocular lens, unspecified laterality  H/O: Hysterectomy      Family history:  Family history of cardiomyopathy      Social History:     ROS:     General:  No wt loss, fevers, chills, night sweats, fatigue,   Eyes:  Good vision, no reported pain  ENT:  No sore throat, pain, runny nose, dysphagia  CV:  No pain, palpitations, hypo/hypertension  Resp:  No dyspnea, cough, tachypnea, wheezing  GI:  No pain, No nausea, No vomiting, No diarrhea, No constipation, No weight loss, No fever, No pruritis, No rectal bleeding, No tarry stools, No dysphagia,  :  No pain, bleeding, incontinence, nocturia  Muscle:  No pain, weakness  Neuro:  No weakness, tingling, memory problems  Psych:  No fatigue, insomnia, mood problems, depression  Endocrine:  No polyuria, polydipsia, cold/heat intolerance  Heme:  No petechiae, ecchymosis, easy bruisability  Skin:  No rash, tattoos, scars, edema      PHYSICAL EXAM:   Vital Signs:  Vital Signs Last 24 Hrs  T(C): 37.1 (29 Sep 2019 10:29), Max: 37.1 (28 Sep 2019 18:02)  T(F): 98.7 (29 Sep 2019 10:29), Max: 98.8 (28 Sep 2019 18:02)  HR: 66 (29 Sep 2019 10:29) (64 - 76)  BP: 130/69 (29 Sep 2019 10:29) (130/69 - 169/69)  BP(mean): --  RR: 18 (29 Sep 2019 10:29) (18 - 18)  SpO2: 99% (29 Sep 2019 10:29) (98% - 100%)  Daily     Daily     GENERAL:  Appears stated age, well-groomed, well-nourished, no distress  HEENT:  NC/AT,  conjunctivae clear and pink, no thyromegaly, nodules, adenopathy, no JVD, sclera -anicteric  CHEST:  Full & symmetric excursion, no increased effort, breath sounds clear  HEART:  Regular rhythm, S1, S2, no murmur/rub/S3/S4, no abdominal bruit, no edema  ABDOMEN:  Soft, non-tender, non-distended, normoactive bowel sounds,  no masses ,no hepato-splenomegaly, no signs of chronic liver disease  EXTEREMITIES:  no cyanosis,clubbing or edema  SKIN:  No rash/erythema/ecchymoses/petechiae/wounds/abscess/warm/dry  NEURO:  Alert, oriented, no asterixis, no tremor, no encephalopathy    LABS:                        11.5   5.53  )-----------( 131      ( 29 Sep 2019 05:15 )             36.5     09-29    135  |  98  |  3<L>  ----------------------------<  160<H>  3.6   |  25  |  0.42<L>    Ca    9.1      29 Sep 2019 05:15  Phos  2.4     09-29                Imaging:

## 2019-09-29 NOTE — PROGRESS NOTE ADULT - SUBJECTIVE AND OBJECTIVE BOX
Patient is a 78y old  Female who presents with a chief complaint of Rectal prolapse (28 Sep 2019 12:26)      SUBJECTIVE / OVERNIGHT EVENTS: overnight events noted    ROS:  Resp: No cough no sputum production  CVS: No chest pain no palpitations no orthopnea  GI: no N/V/D  : no dysuria, no hematuria  Neuro: no weakness no paresthesias  Heme: No petechiae no easy bruising  Msk: No joint pain no swelling  Skin: No rash no itching        MEDICATIONS  (STANDING):  amLODIPine   Tablet 5 milliGRAM(s) Oral daily  dextrose 5% + sodium chloride 0.45% with potassium chloride 20 mEq/L 1000 milliLiter(s) (50 mL/Hr) IV Continuous <Continuous>  heparin  Injectable 5000 Unit(s) SubCutaneous every 8 hours  influenza   Vaccine 0.5 milliLiter(s) IntraMuscular once  losartan 100 milliGRAM(s) Oral daily    MEDICATIONS  (PRN):  acetaminophen   Tablet .. 650 milliGRAM(s) Oral every 6 hours PRN Moderate Pain (4 - 6)  artificial  tears Solution 1 Drop(s) Both EYES three times a day PRN Dry Eyes  morphine  - Injectable 2 milliGRAM(s) IV Push every 4 hours PRN Moderate Pain (4 - 6)        CAPILLARY BLOOD GLUCOSE        I&O's Summary    28 Sep 2019 07:01  -  29 Sep 2019 07:00  --------------------------------------------------------  IN: 1650 mL / OUT: 3250 mL / NET: -1600 mL    29 Sep 2019 07:01  -  29 Sep 2019 12:11  --------------------------------------------------------  IN: 320 mL / OUT: 500 mL / NET: -180 mL        Vital Signs Last 24 Hrs  T(C): 37.1 (29 Sep 2019 10:29), Max: 37.1 (28 Sep 2019 18:02)  T(F): 98.7 (29 Sep 2019 10:29), Max: 98.8 (28 Sep 2019 18:02)  HR: 66 (29 Sep 2019 10:29) (64 - 76)  BP: 130/69 (29 Sep 2019 10:29) (130/69 - 169/69)  BP(mean): --  RR: 18 (29 Sep 2019 10:29) (18 - 18)  SpO2: 99% (29 Sep 2019 10:29) (98% - 100%)    PHYSICAL EXAM:  GENERAL: NAD, cachectic  HEAD:  Atraumatic, Normocephalic  EYES: EOMI, PERRLA, conjunctiva and sclera clear  NECK: Supple, No JVD  CHEST/LUNG: Clear to auscultation bilaterally; No wheeze  HEART: S1S2; No rubs, or gallops, no murmurs  ABDOMEN: Soft, Nontender, Nondistended; Bowel sounds present  EXTREMITIES:  + Peripheral Pulses, No clubbing or cyanosis, no edema  PSYCH: AO x 3,   NEUROLOGY: mild dysarthric speech (chronic) and  left mild residual hemiparesis  SKIN: No rashes or lesions    LABS:                        11.5   5.53  )-----------( 131      ( 29 Sep 2019 05:15 )             36.5     09-29    135  |  98  |  3<L>  ----------------------------<  160<H>  3.6   |  25  |  0.42<L>    Ca    9.1      29 Sep 2019 05:15  Phos  2.4     09-29                  All consultant(s) notes reviewed and care discussed with other providers        Contact Number, Dr Bishop [phone]

## 2019-09-29 NOTE — PROGRESS NOTE ADULT - SUBJECTIVE AND OBJECTIVE BOX
GENERAL SURGERY DAILY PROGRESS NOTE:    Interval:  No acute events overnight endorsed.    Subjective:  Patient seen and examined this am. -Passing flatus. Denies prolapse     Vital Signs Last 24 Hrs  T(C): 37.1 (29 Sep 2019 10:29), Max: 37.1 (28 Sep 2019 18:02)  T(F): 98.7 (29 Sep 2019 10:29), Max: 98.8 (28 Sep 2019 18:02)  HR: 66 (29 Sep 2019 10:29) (64 - 76)  BP: 130/69 (29 Sep 2019 10:29) (130/69 - 169/69)  BP(mean): --  RR: 18 (29 Sep 2019 10:29) (18 - 18)  SpO2: 99% (29 Sep 2019 10:29) (98% - 100%)    Exam:  Gen: NAD, resting in bed, alert and responding appropriately  Resp: Airway patent, non-labored respirations  Abd: Soft, ND, NTTP x 4 quadrants, no rebound or guarding.   Ext: No edema, WWP  Neuro: AAOx3, no focal deficits    I&O's Detail    28 Sep 2019 07:01  -  29 Sep 2019 07:00  --------------------------------------------------------  IN:    dextrose 5% + sodium chloride 0.45% with potassium chloride 20 mEq/L: 1200 mL    Oral Fluid: 450 mL  Total IN: 1650 mL    OUT:    Indwelling Catheter - Urethral: 3250 mL  Total OUT: 3250 mL    Total NET: -1600 mL      29 Sep 2019 07:01  -  29 Sep 2019 12:28  --------------------------------------------------------  IN:    dextrose 5% + sodium chloride 0.45% with potassium chloride 20 mEq/L: 200 mL    Oral Fluid: 120 mL  Total IN: 320 mL    OUT:    Indwelling Catheter - Urethral: 500 mL  Total OUT: 500 mL    Total NET: -180 mL          Daily     Daily     MEDICATIONS  (STANDING):  amLODIPine   Tablet 5 milliGRAM(s) Oral daily  dextrose 5% + sodium chloride 0.45% with potassium chloride 20 mEq/L 1000 milliLiter(s) (50 mL/Hr) IV Continuous <Continuous>  heparin  Injectable 5000 Unit(s) SubCutaneous every 8 hours  influenza   Vaccine 0.5 milliLiter(s) IntraMuscular once  losartan 100 milliGRAM(s) Oral daily    MEDICATIONS  (PRN):  acetaminophen   Tablet .. 650 milliGRAM(s) Oral every 6 hours PRN Moderate Pain (4 - 6)  artificial  tears Solution 1 Drop(s) Both EYES three times a day PRN Dry Eyes  morphine  - Injectable 2 milliGRAM(s) IV Push every 4 hours PRN Moderate Pain (4 - 6)      LABS:                        11.5   5.53  )-----------( 131      ( 29 Sep 2019 05:15 )             36.5     09-29    135  |  98  |  3<L>  ----------------------------<  160<H>  3.6   |  25  |  0.42<L>    Ca    9.1      29 Sep 2019 05:15  Phos  2.4     09-29            JAGRUTI Foster, PGY-1  [facility]  [id number] with any questions

## 2019-09-30 DIAGNOSIS — Z71.89 OTHER SPECIFIED COUNSELING: ICD-10-CM

## 2019-09-30 LAB
ANION GAP SERPL CALC-SCNC: 13 MMO/L — SIGNIFICANT CHANGE UP (ref 7–14)
BUN SERPL-MCNC: 3 MG/DL — LOW (ref 7–23)
CALCIUM SERPL-MCNC: 9.4 MG/DL — SIGNIFICANT CHANGE UP (ref 8.4–10.5)
CHLORIDE SERPL-SCNC: 98 MMOL/L — SIGNIFICANT CHANGE UP (ref 98–107)
CO2 SERPL-SCNC: 26 MMOL/L — SIGNIFICANT CHANGE UP (ref 22–31)
CREAT SERPL-MCNC: 0.38 MG/DL — LOW (ref 0.5–1.3)
GLUCOSE SERPL-MCNC: 126 MG/DL — HIGH (ref 70–99)
HCT VFR BLD CALC: 39 % — SIGNIFICANT CHANGE UP (ref 34.5–45)
HGB BLD-MCNC: 12 G/DL — SIGNIFICANT CHANGE UP (ref 11.5–15.5)
MAGNESIUM SERPL-MCNC: 1.5 MG/DL — LOW (ref 1.6–2.6)
MCHC RBC-ENTMCNC: 26 PG — LOW (ref 27–34)
MCHC RBC-ENTMCNC: 30.8 % — LOW (ref 32–36)
MCV RBC AUTO: 84.6 FL — SIGNIFICANT CHANGE UP (ref 80–100)
NRBC # FLD: 0 K/UL — SIGNIFICANT CHANGE UP (ref 0–0)
PHOSPHATE SERPL-MCNC: 2.7 MG/DL — SIGNIFICANT CHANGE UP (ref 2.5–4.5)
PLATELET # BLD AUTO: 150 K/UL — SIGNIFICANT CHANGE UP (ref 150–400)
PMV BLD: 11.1 FL — SIGNIFICANT CHANGE UP (ref 7–13)
POTASSIUM SERPL-MCNC: 3.5 MMOL/L — SIGNIFICANT CHANGE UP (ref 3.5–5.3)
POTASSIUM SERPL-SCNC: 3.5 MMOL/L — SIGNIFICANT CHANGE UP (ref 3.5–5.3)
RBC # BLD: 4.61 M/UL — SIGNIFICANT CHANGE UP (ref 3.8–5.2)
RBC # FLD: 13.2 % — SIGNIFICANT CHANGE UP (ref 10.3–14.5)
SODIUM SERPL-SCNC: 137 MMOL/L — SIGNIFICANT CHANGE UP (ref 135–145)
WBC # BLD: 4.58 K/UL — SIGNIFICANT CHANGE UP (ref 3.8–10.5)
WBC # FLD AUTO: 4.58 K/UL — SIGNIFICANT CHANGE UP (ref 3.8–10.5)

## 2019-09-30 PROCEDURE — 74177 CT ABD & PELVIS W/CONTRAST: CPT | Mod: 26

## 2019-09-30 PROCEDURE — 71260 CT THORAX DX C+: CPT | Mod: 26

## 2019-09-30 RX ORDER — POTASSIUM CHLORIDE 20 MEQ
20 PACKET (EA) ORAL ONCE
Refills: 0 | Status: COMPLETED | OUTPATIENT
Start: 2019-09-30 | End: 2019-09-30

## 2019-09-30 RX ORDER — SOD SULF/SODIUM/NAHCO3/KCL/PEG
1 SOLUTION, RECONSTITUTED, ORAL ORAL EVERY 4 HOURS
Refills: 0 | Status: COMPLETED | OUTPATIENT
Start: 2019-09-30 | End: 2019-09-30

## 2019-09-30 RX ORDER — MAGNESIUM SULFATE 500 MG/ML
2 VIAL (ML) INJECTION ONCE
Refills: 0 | Status: COMPLETED | OUTPATIENT
Start: 2019-09-30 | End: 2019-09-30

## 2019-09-30 RX ADMIN — HEPARIN SODIUM 5000 UNIT(S): 5000 INJECTION INTRAVENOUS; SUBCUTANEOUS at 21:06

## 2019-09-30 RX ADMIN — Medication 20 MILLIEQUIVALENT(S): at 14:32

## 2019-09-30 RX ADMIN — LOSARTAN POTASSIUM 100 MILLIGRAM(S): 100 TABLET, FILM COATED ORAL at 05:47

## 2019-09-30 RX ADMIN — HEPARIN SODIUM 5000 UNIT(S): 5000 INJECTION INTRAVENOUS; SUBCUTANEOUS at 14:32

## 2019-09-30 RX ADMIN — AMLODIPINE BESYLATE 5 MILLIGRAM(S): 2.5 TABLET ORAL at 05:47

## 2019-09-30 RX ADMIN — Medication 10 MILLIGRAM(S): at 16:24

## 2019-09-30 RX ADMIN — HEPARIN SODIUM 5000 UNIT(S): 5000 INJECTION INTRAVENOUS; SUBCUTANEOUS at 05:47

## 2019-09-30 RX ADMIN — Medication 1 LITER(S): at 22:27

## 2019-09-30 RX ADMIN — Medication 10 MILLIGRAM(S): at 19:20

## 2019-09-30 RX ADMIN — Medication 50 GRAM(S): at 14:32

## 2019-09-30 NOTE — PROGRESS NOTE ADULT - ASSESSMENT
A/P: 78F w/ PMH of Lupus (on long-term prednisone), HTN, Anemia, HLD, Osteoporosis, DM, Gout, Depression, CVA, now s/p successful reduction of rectal prolapse under anesthesia    - pain control PRN   - Adv. to LRD  - Spoke w/ pt.'s Rheumatologist yesterday, who confirmed that pt. is on plaquenil 200 mg daily, and also said they had no concerns for proceeding w/ the planned rectopexy this upcoming week  - OR next week for rectopexy   - medically cleared  - CT and echo per medicine  - monitor for recurrence of prolapse    A Team Surgery   [id number] A/P: 78F w/ PMH of Lupus (on long-term prednisone), HTN, Anemia, HLD, Osteoporosis, DM, Gout, Depression, CVA, now s/p successful reduction of rectal prolapse under anesthesia    - pain control PRN   - Adv. to LRD  - IVL  - Spoke w/ pt.'s Rheumatologist yesterday, who confirmed that pt. is on plaquenil 200 mg daily, and also said they had no concerns for proceeding w/ the planned rectopexy this upcoming week  - OR next week for rectopexy   - medically cleared  - CT and echo per medicine  - monitor for recurrence of prolapse    [facility]   [id number] A/P: 78F w/ PMH of Lupus (on long-term prednisone), HTN, Anemia, HLD, Osteoporosis, DM, Gout, Depression, CVA, now s/p successful reduction of rectal prolapse under anesthesia    - pain control PRN   - CLD  - GI to scope  - IVL  - Spoke w/ pt.'s Rheumatologist yesterday, who confirmed that pt. is on plaquenil 200 mg daily, and also said they had no concerns for proceeding w/ the planned rectopexy this upcoming week  - OR next week for rectopexy   - medically cleared  - CT and echo per medicine  - monitor for recurrence of prolapse    [facility]   [id number] A/P: 78F w/ PMH of Lupus (on long-term prednisone), HTN, Anemia, HLD, Osteoporosis, DM, Gout, Depression, CVA, now s/p successful reduction of rectal prolapse under anesthesia    - pain control PRN   - CLD  - GI to scope  - IVL  - Spoke w/ pt.'s Rheumatologist yesterday, who confirmed that pt. is on plaquenil 200 mg daily, and also said they had no concerns for proceeding w/ the planned rectopexy this upcoming week  - OR next week for rectopexy   - medically cleared  - CT and echo per medicine  - monitor for recurrence of prolapse  - Repleted K Mg for slightly low K and hypomagnesemia on labs    [facility]   [id number]

## 2019-09-30 NOTE — PROGRESS NOTE ADULT - SUBJECTIVE AND OBJECTIVE BOX
INTERVAL HPI/OVERNIGHT EVENTS:    feels well   no nausea or vomiting   no abd pain   tolerating clear diet   agreeable to colonoscopy tomorrow     MEDICATIONS  (STANDING):  amLODIPine   Tablet 5 milliGRAM(s) Oral daily  bisacodyl 10 milliGRAM(s) Oral every 4 hours  heparin  Injectable 5000 Unit(s) SubCutaneous every 8 hours  influenza   Vaccine 0.5 milliLiter(s) IntraMuscular once  losartan 100 milliGRAM(s) Oral daily  magnesium sulfate  IVPB 2 Gram(s) IV Intermittent once  polyethylene glycol/electrolyte Solution 1 Liter(s) Oral every 4 hours  potassium chloride    Tablet ER 20 milliEquivalent(s) Oral once    MEDICATIONS  (PRN):  acetaminophen   Tablet .. 650 milliGRAM(s) Oral every 6 hours PRN Moderate Pain (4 - 6)  artificial  tears Solution 1 Drop(s) Both EYES three times a day PRN Dry Eyes  morphine  - Injectable 2 milliGRAM(s) IV Push every 4 hours PRN Moderate Pain (4 - 6)      Allergies    SULFA (Unknown)  sulfa drugs (Unknown)    Intolerances        Review of Systems:    General:  No wt loss, fevers, chills, night sweats, fatigue   Eyes:  Good vision, no reported pain  ENT:  No sore throat, pain, runny nose, dysphagia  CV:  No pain, palpitations, hypo/hypertension  Resp:  No dyspnea, cough, tachypnea, wheezing  GI:  No pain, No nausea, No vomiting, No diarrhea, No constipation, No weight loss, No fever, No pruritis, No rectal bleeding, No melena, No dysphagia  :  No pain, bleeding, incontinence, nocturia  Muscle:  No pain, weakness  Neuro:  No weakness, tingling, memory problems  Psych:  No fatigue, insomnia, mood problems, depression  Endocrine:  No polyuria, polydypsia, cold/heat intolerance  Heme:  No petechiae, ecchymosis, easy bruisability  Skin:  No rash, tattoos, scars, edema      Vital Signs Last 24 Hrs  T(C): 36.6 (30 Sep 2019 10:51), Max: 37.1 (29 Sep 2019 22:26)  T(F): 97.9 (30 Sep 2019 10:51), Max: 98.7 (29 Sep 2019 22:26)  HR: 87 (30 Sep 2019 10:51) (76 - 96)  BP: 168/65 (30 Sep 2019 10:51) (147/78 - 168/65)  BP(mean): --  RR: 18 (30 Sep 2019 10:51) (18 - 20)  SpO2: 100% (30 Sep 2019 10:51) (99% - 100%)    PHYSICAL EXAM:    Constitutional: NAD  HEENT: EOMI, throat clear  Neck: No LAD, supple  Respiratory: CTA and P  Cardiovascular: S1 and S2, RRR, no M  Gastrointestinal: BS+, soft, NT/ND, neg HSM,  Extremities: No peripheral edema, neg clubbing, cyanosis  Vascular: 2+ peripheral pulses  Neurological: A/O x 3, no focal deficits  Psychiatric: Normal mood, normal affect  Skin: No rashes      LABS:                        12.0   4.58  )-----------( 150      ( 30 Sep 2019 06:15 )             39.0     09-30    137  |  98  |  3<L>  ----------------------------<  126<H>  3.5   |  26  |  0.38<L>    Ca    9.4      30 Sep 2019 06:15  Phos  2.7     09-30  Mg     1.5     09-30            RADIOLOGY & ADDITIONAL TESTS:

## 2019-09-30 NOTE — PROGRESS NOTE ADULT - SUBJECTIVE AND OBJECTIVE BOX
Patient is a 78y old  Female who presents with a chief complaint of Rectal prolapse (30 Sep 2019 12:08)      SUBJECTIVE / OVERNIGHT EVENTS: overnight events noted    ROS:  Resp: No cough no sputum production  CVS: No chest pain no palpitations no orthopnea  GI: no N/V/D  : no dysuria, no hematuria  Neuro: no weakness no paresthesias  Heme: No petechiae no easy bruising  Msk: No joint pain no swelling  Skin: No rash no itching        MEDICATIONS  (STANDING):  amLODIPine   Tablet 5 milliGRAM(s) Oral daily  bisacodyl 10 milliGRAM(s) Oral every 4 hours  heparin  Injectable 5000 Unit(s) SubCutaneous every 8 hours  influenza   Vaccine 0.5 milliLiter(s) IntraMuscular once  losartan 100 milliGRAM(s) Oral daily  magnesium sulfate  IVPB 2 Gram(s) IV Intermittent once  polyethylene glycol/electrolyte Solution 1 Liter(s) Oral every 4 hours  potassium chloride    Tablet ER 20 milliEquivalent(s) Oral once    MEDICATIONS  (PRN):  acetaminophen   Tablet .. 650 milliGRAM(s) Oral every 6 hours PRN Moderate Pain (4 - 6)  artificial  tears Solution 1 Drop(s) Both EYES three times a day PRN Dry Eyes  morphine  - Injectable 2 milliGRAM(s) IV Push every 4 hours PRN Moderate Pain (4 - 6)        CAPILLARY BLOOD GLUCOSE        I&O's Summary    29 Sep 2019 07:01  -  30 Sep 2019 07:00  --------------------------------------------------------  IN: 1460 mL / OUT: 2050 mL / NET: -590 mL        Vital Signs Last 24 Hrs  T(C): 36.6 (30 Sep 2019 10:51), Max: 37.1 (29 Sep 2019 22:26)  T(F): 97.9 (30 Sep 2019 10:51), Max: 98.7 (29 Sep 2019 22:26)  HR: 87 (30 Sep 2019 10:51) (76 - 96)  BP: 168/65 (30 Sep 2019 10:51) (147/78 - 168/65)  BP(mean): --  RR: 18 (30 Sep 2019 10:51) (18 - 20)  SpO2: 100% (30 Sep 2019 10:51) (99% - 100%)    PHYSICAL EXAM:  GENERAL: NAD, well-developed  HEAD:  Atraumatic, Normocephalic  EYES: EOMI, PERRLA, conjunctiva and sclera clear  NECK: Supple, No JVD  CHEST/LUNG: no rhonchi, no wheeze, clear to auscultation bilaterally  HEART: S1 S2; No rubs or gallops, no murmurs appreciated  ABDOMEN: Soft, Nontender, Nondistended; Bowel sounds present  EXTREMITIES:  No clubbing or cyanosis, + Peripheral Pulses,  no edema  PSYCH: AO x 3 appropriate affect  NEUROLOGY: non-focal, motor and sensory systems intact  SKIN: No rashes or lesions    LABS:                        12.0   4.58  )-----------( 150      ( 30 Sep 2019 06:15 )             39.0     09-30    137  |  98  |  3<L>  ----------------------------<  126<H>  3.5   |  26  |  0.38<L>    Ca    9.4      30 Sep 2019 06:15  Phos  2.7     09-30  Mg     1.5     09-30                  All consultant(s) notes reviewed and care discussed with other providers        Contact Number, Dr Bishop [phone] Patient is a 78y old  Female who presents with a chief complaint of Rectal prolapse (30 Sep 2019 12:08)      SUBJECTIVE / OVERNIGHT EVENTS: overnight events noted    ROS:  Resp: No cough no sputum production  CVS: No chest pain no palpitations no orthopnea  GI: no N/V/D  : no dysuria, no hematuria  Neuro: no weakness no paresthesias  Heme: No petechiae no easy bruising  Msk: No joint pain no swelling  Skin: No rash no itching        MEDICATIONS  (STANDING):  amLODIPine   Tablet 5 milliGRAM(s) Oral daily  bisacodyl 10 milliGRAM(s) Oral every 4 hours  heparin  Injectable 5000 Unit(s) SubCutaneous every 8 hours  influenza   Vaccine 0.5 milliLiter(s) IntraMuscular once  losartan 100 milliGRAM(s) Oral daily  magnesium sulfate  IVPB 2 Gram(s) IV Intermittent once  polyethylene glycol/electrolyte Solution 1 Liter(s) Oral every 4 hours  potassium chloride    Tablet ER 20 milliEquivalent(s) Oral once    MEDICATIONS  (PRN):  acetaminophen   Tablet .. 650 milliGRAM(s) Oral every 6 hours PRN Moderate Pain (4 - 6)  artificial  tears Solution 1 Drop(s) Both EYES three times a day PRN Dry Eyes  morphine  - Injectable 2 milliGRAM(s) IV Push every 4 hours PRN Moderate Pain (4 - 6)        CAPILLARY BLOOD GLUCOSE        I&O's Summary    29 Sep 2019 07:01  -  30 Sep 2019 07:00  --------------------------------------------------------  IN: 1460 mL / OUT: 2050 mL / NET: -590 mL        Vital Signs Last 24 Hrs  T(C): 36.6 (30 Sep 2019 10:51), Max: 37.1 (29 Sep 2019 22:26)  T(F): 97.9 (30 Sep 2019 10:51), Max: 98.7 (29 Sep 2019 22:26)  HR: 87 (30 Sep 2019 10:51) (76 - 96)  BP: 168/65 (30 Sep 2019 10:51) (147/78 - 168/65)  BP(mean): --  RR: 18 (30 Sep 2019 10:51) (18 - 20)  SpO2: 100% (30 Sep 2019 10:51) (99% - 100%)    PHYSICAL EXAM:  GENERAL: NAD, cachectic  HEAD:  Atraumatic, Normocephalic  EYES: EOMI, PERRLA, conjunctiva and sclera clear  NECK: Supple, No JVD  CHEST/LUNG: Clear to auscultation bilaterally; No wheeze  HEART: S1S2; No rubs, or gallops, no murmurs  ABDOMEN: Soft, Nontender, Nondistended; Bowel sounds present  EXTREMITIES:  + Peripheral Pulses, No clubbing or cyanosis, no edema  PSYCH: AO x 3,   NEUROLOGY: mild dysarthric speech (chronic) and  left mild residual hemiparesis  SKIN: No rashes or lesions    LABS:                        12.0   4.58  )-----------( 150      ( 30 Sep 2019 06:15 )             39.0     09-30    137  |  98  |  3<L>  ----------------------------<  126<H>  3.5   |  26  |  0.38<L>    Ca    9.4      30 Sep 2019 06:15  Phos  2.7     09-30  Mg     1.5     09-30                  All consultant(s) notes reviewed and care discussed with other providers        Contact Number, Dr Bishop [phone]

## 2019-09-30 NOTE — PROGRESS NOTE ADULT - SUBJECTIVE AND OBJECTIVE BOX
GENERAL SURGERY DAILY PROGRESS NOTE:    Interval:  No acute events overnight endorsed.    Subjective:  Patient seen and examined this am. Complained of urinating often. Counselled diet surgery    Vital Signs Last 24 Hrs  T(C): 36.7 (30 Sep 2019 05:39), Max: 37.1 (29 Sep 2019 10:29)  T(F): 98 (30 Sep 2019 05:39), Max: 98.7 (29 Sep 2019 10:29)  HR: 76 (30 Sep 2019 05:39) (66 - 96)  BP: 159/81 (30 Sep 2019 05:39) (130/69 - 159/81)  BP(mean): --  RR: 18 (30 Sep 2019 05:39) (18 - 20)  SpO2: 100% (30 Sep 2019 05:39) (99% - 100%)    Exam:  Gen: NAD, resting in bed, alert and responding appropriately  Resp: Airway patent, non-labored respirations  Abd: Soft, ND, NTTP x 4 quadrants, no rebound or guarding.   Rectum: No prolapse  Ext: No edema, WWP  Neuro: AAOx3, no focal deficits    I&O's Detail    29 Sep 2019 07:01  -  30 Sep 2019 07:00  --------------------------------------------------------  IN:    dextrose 5% + sodium chloride 0.45% with potassium chloride 20 mEq/L: 900 mL    Oral Fluid: 460 mL  Total IN: 1360 mL    OUT:    Indwelling Catheter - Urethral: 500 mL    Voided: 1250 mL  Total OUT: 1750 mL    Total NET: -390 mL          Daily     Daily     MEDICATIONS  (STANDING):  amLODIPine   Tablet 5 milliGRAM(s) Oral daily  heparin  Injectable 5000 Unit(s) SubCutaneous every 8 hours  influenza   Vaccine 0.5 milliLiter(s) IntraMuscular once  losartan 100 milliGRAM(s) Oral daily    MEDICATIONS  (PRN):  acetaminophen   Tablet .. 650 milliGRAM(s) Oral every 6 hours PRN Moderate Pain (4 - 6)  artificial  tears Solution 1 Drop(s) Both EYES three times a day PRN Dry Eyes  morphine  - Injectable 2 milliGRAM(s) IV Push every 4 hours PRN Moderate Pain (4 - 6)      LABS:                        11.5   5.53  )-----------( 131      ( 29 Sep 2019 05:15 )             36.5     09-29    135  |  98  |  3<L>  ----------------------------<  160<H>  3.6   |  25  |  0.42<L>    Ca    9.1      29 Sep 2019 05:15  Phos  2.4     09-29            JAGRUTI Foster, PGY-1  A Team Surgery  [id number] with any questions

## 2019-09-30 NOTE — PROGRESS NOTE ADULT - ASSESSMENT
78F w/ PMH of Lupus (on long-term prednisone), HTN, Anemia, HLD, Osteoporosis, DM, Gout, Depression, CVA here with rectal prolapse

## 2019-10-01 ENCOUNTER — OUTPATIENT (OUTPATIENT)
Dept: OUTPATIENT SERVICES | Facility: HOSPITAL | Age: 79
LOS: 1 days | End: 2019-10-01

## 2019-10-01 DIAGNOSIS — Z98.49 CATARACT EXTRACTION STATUS, UNSPECIFIED EYE: Chronic | ICD-10-CM

## 2019-10-01 LAB
ANION GAP SERPL CALC-SCNC: 15 MMO/L — HIGH (ref 7–14)
BUN SERPL-MCNC: 8 MG/DL — SIGNIFICANT CHANGE UP (ref 7–23)
CALCIUM SERPL-MCNC: 9.8 MG/DL — SIGNIFICANT CHANGE UP (ref 8.4–10.5)
CHLORIDE SERPL-SCNC: 102 MMOL/L — SIGNIFICANT CHANGE UP (ref 98–107)
CO2 SERPL-SCNC: 20 MMOL/L — LOW (ref 22–31)
CREAT SERPL-MCNC: 0.55 MG/DL — SIGNIFICANT CHANGE UP (ref 0.5–1.3)
GLUCOSE SERPL-MCNC: 130 MG/DL — HIGH (ref 70–99)
HCT VFR BLD CALC: 35.7 % — SIGNIFICANT CHANGE UP (ref 34.5–45)
HGB BLD-MCNC: 11.5 G/DL — SIGNIFICANT CHANGE UP (ref 11.5–15.5)
MAGNESIUM SERPL-MCNC: 1.8 MG/DL — SIGNIFICANT CHANGE UP (ref 1.6–2.6)
MCHC RBC-ENTMCNC: 26.6 PG — LOW (ref 27–34)
MCHC RBC-ENTMCNC: 32.2 % — SIGNIFICANT CHANGE UP (ref 32–36)
MCV RBC AUTO: 82.6 FL — SIGNIFICANT CHANGE UP (ref 80–100)
NRBC # FLD: 0 K/UL — SIGNIFICANT CHANGE UP (ref 0–0)
PHOSPHATE SERPL-MCNC: 3.4 MG/DL — SIGNIFICANT CHANGE UP (ref 2.5–4.5)
PLATELET # BLD AUTO: 187 K/UL — SIGNIFICANT CHANGE UP (ref 150–400)
PMV BLD: 10.8 FL — SIGNIFICANT CHANGE UP (ref 7–13)
POTASSIUM SERPL-MCNC: 3.5 MMOL/L — SIGNIFICANT CHANGE UP (ref 3.5–5.3)
POTASSIUM SERPL-SCNC: 3.5 MMOL/L — SIGNIFICANT CHANGE UP (ref 3.5–5.3)
RBC # BLD: 4.32 M/UL — SIGNIFICANT CHANGE UP (ref 3.8–5.2)
RBC # FLD: 13.2 % — SIGNIFICANT CHANGE UP (ref 10.3–14.5)
SODIUM SERPL-SCNC: 137 MMOL/L — SIGNIFICANT CHANGE UP (ref 135–145)
WBC # BLD: 4.26 K/UL — SIGNIFICANT CHANGE UP (ref 3.8–10.5)
WBC # FLD AUTO: 4.26 K/UL — SIGNIFICANT CHANGE UP (ref 3.8–10.5)

## 2019-10-01 PROCEDURE — 93010 ELECTROCARDIOGRAM REPORT: CPT

## 2019-10-01 PROCEDURE — 99232 SBSQ HOSP IP/OBS MODERATE 35: CPT

## 2019-10-01 RX ORDER — SODIUM CHLORIDE 9 MG/ML
1000 INJECTION INTRAMUSCULAR; INTRAVENOUS; SUBCUTANEOUS
Refills: 0 | Status: DISCONTINUED | OUTPATIENT
Start: 2019-10-01 | End: 2019-10-02

## 2019-10-01 RX ORDER — POTASSIUM CHLORIDE 20 MEQ
10 PACKET (EA) ORAL ONCE
Refills: 0 | Status: COMPLETED | OUTPATIENT
Start: 2019-10-01 | End: 2019-10-01

## 2019-10-01 RX ORDER — MAGNESIUM SULFATE 500 MG/ML
2 VIAL (ML) INJECTION ONCE
Refills: 0 | Status: COMPLETED | OUTPATIENT
Start: 2019-10-01 | End: 2019-10-01

## 2019-10-01 RX ORDER — DEXTROSE MONOHYDRATE, SODIUM CHLORIDE, AND POTASSIUM CHLORIDE 50; .745; 4.5 G/1000ML; G/1000ML; G/1000ML
1000 INJECTION, SOLUTION INTRAVENOUS
Refills: 0 | Status: DISCONTINUED | OUTPATIENT
Start: 2019-10-01 | End: 2019-10-01

## 2019-10-01 RX ORDER — SODIUM CHLORIDE 9 MG/ML
1000 INJECTION, SOLUTION INTRAVENOUS
Refills: 0 | Status: DISCONTINUED | OUTPATIENT
Start: 2019-10-01 | End: 2019-10-01

## 2019-10-01 RX ADMIN — AMLODIPINE BESYLATE 5 MILLIGRAM(S): 2.5 TABLET ORAL at 05:03

## 2019-10-01 RX ADMIN — DEXTROSE MONOHYDRATE, SODIUM CHLORIDE, AND POTASSIUM CHLORIDE 50 MILLILITER(S): 50; .745; 4.5 INJECTION, SOLUTION INTRAVENOUS at 07:14

## 2019-10-01 RX ADMIN — HEPARIN SODIUM 5000 UNIT(S): 5000 INJECTION INTRAVENOUS; SUBCUTANEOUS at 05:03

## 2019-10-01 RX ADMIN — LOSARTAN POTASSIUM 100 MILLIGRAM(S): 100 TABLET, FILM COATED ORAL at 05:03

## 2019-10-01 RX ADMIN — SODIUM CHLORIDE 75 MILLILITER(S): 9 INJECTION INTRAMUSCULAR; INTRAVENOUS; SUBCUTANEOUS at 22:19

## 2019-10-01 RX ADMIN — Medication 50 GRAM(S): at 11:26

## 2019-10-01 RX ADMIN — SODIUM CHLORIDE 75 MILLILITER(S): 9 INJECTION, SOLUTION INTRAVENOUS at 14:15

## 2019-10-01 RX ADMIN — HEPARIN SODIUM 5000 UNIT(S): 5000 INJECTION INTRAVENOUS; SUBCUTANEOUS at 22:19

## 2019-10-01 RX ADMIN — Medication 100 MILLIEQUIVALENT(S): at 08:50

## 2019-10-01 NOTE — PROGRESS NOTE ADULT - ASSESSMENT
78F w/ PMH of Lupus (on long-term prednisone), HTN, Anemia, HLD, Osteoporosis, DM, Gout, Depression, CVA, now s/p successful reduction of rectal prolapse under anesthesia, going for scope today    - GI to scope today  - + IVF  - NPO for scope today  - OR Friday for rectopexy   - medically cleared  - CT and echo per medicine, negative  - monitor for recurrence of prolapse  - DVT PPX    A Team Surgery   [id number] 78F w/ PMH of Lupus (on long-term prednisone), HTN, Anemia, HLD, Osteoporosis, DM, Gout, Depression, CVA, now s/p successful reduction of rectal prolapse under anesthesia, going for scope today    - GI to scope today  - + IVF  - NPO for scope today  - OR Friday for rectopexy   - medically cleared  - Continue amlodipine and losartan for hypertension  - CT and echo per medicine, negative  - monitor for recurrence of prolapse  - DVT PPX    A Team Surgery   [id number]

## 2019-10-01 NOTE — PROGRESS NOTE ADULT - SUBJECTIVE AND OBJECTIVE BOX
Patient is a 78y old  Female who presents with a chief complaint of Rectal prolapse (01 Oct 2019 07:50)      SUBJECTIVE / OVERNIGHT EVENTS: overnight events noted    ROS:  Resp: No cough no sputum production  CVS: No chest pain no palpitations no orthopnea  GI: no N/V/D  : no dysuria, no hematuria  Neuro: no weakness no paresthesias  Heme: No petechiae no easy bruising  Msk: No joint pain no swelling  Skin: No rash no itching        MEDICATIONS  (STANDING):  amLODIPine   Tablet 5 milliGRAM(s) Oral daily  dextrose 5% + sodium chloride 0.45% with potassium chloride 20 mEq/L 1000 milliLiter(s) (50 mL/Hr) IV Continuous <Continuous>  heparin  Injectable 5000 Unit(s) SubCutaneous every 8 hours  influenza   Vaccine 0.5 milliLiter(s) IntraMuscular once  losartan 100 milliGRAM(s) Oral daily    MEDICATIONS  (PRN):  acetaminophen   Tablet .. 650 milliGRAM(s) Oral every 6 hours PRN Moderate Pain (4 - 6)  artificial  tears Solution 1 Drop(s) Both EYES three times a day PRN Dry Eyes  morphine  - Injectable 2 milliGRAM(s) IV Push every 4 hours PRN Moderate Pain (4 - 6)        CAPILLARY BLOOD GLUCOSE        I&O's Summary    30 Sep 2019 07:01  -  01 Oct 2019 07:00  --------------------------------------------------------  IN: 1100 mL / OUT: 400 mL / NET: 700 mL    01 Oct 2019 07:01  -  01 Oct 2019 13:04  --------------------------------------------------------  IN: 0 mL / OUT: 0 mL / NET: 0 mL        Vital Signs Last 24 Hrs  T(C): 36.5 (01 Oct 2019 12:12), Max: 37.2 (01 Oct 2019 05:06)  T(F): 97.7 (01 Oct 2019 12:12), Max: 98.9 (01 Oct 2019 05:06)  HR: 75 (01 Oct 2019 12:12) (75 - 96)  BP: 149/72 (01 Oct 2019 12:12) (137/79 - 171/87)  BP(mean): --  RR: 17 (01 Oct 2019 12:12) (16 - 18)  SpO2: 100% (01 Oct 2019 10:00) (100% - 100%)    PHYSICAL EXAM:  GENERAL: NAD, cachectic  HEAD:  Atraumatic, Normocephalic  EYES: EOMI, PERRLA, conjunctiva and sclera clear  NECK: Supple, No JVD  CHEST/LUNG: Clear to auscultation bilaterally; No wheeze  HEART: S1S2; No rubs, or gallops, no murmurs  ABDOMEN: Soft, Nontender, Nondistended; Bowel sounds present  EXTREMITIES:  + Peripheral Pulses, No clubbing or cyanosis, no edema  PSYCH: AO x 3,   NEUROLOGY: mild dysarthric speech (chronic) and  left mild residual hemiparesis  SKIN: No rashes or lesions    LABS:                        11.5   4.26  )-----------( 187      ( 01 Oct 2019 06:30 )             35.7     10-01    137  |  102  |  8   ----------------------------<  130<H>  3.5   |  20<L>  |  0.55    Ca    9.8      01 Oct 2019 06:30  Phos  3.4     10-01  Mg     1.8     10-01                  All consultant(s) notes reviewed and care discussed with other providers        Contact Number, Dr Bishop [phone]

## 2019-10-01 NOTE — PROGRESS NOTE ADULT - SUBJECTIVE AND OBJECTIVE BOX
Morning Surgical Progress Note  Patient is a 78y old  Female who presents with a chief complaint of Rectal prolapse (30 Sep 2019 13:17)    SUBJECTIVE: Patient seen and examined at bedside with surgical team, patient without complaints. Had bowel prep overnight and is passing clear liquid stool    HPI: 78F w/ PMH of Lupus (on long-term prednisone), HTN, Anemia, HLD, Osteoporosis, DM, Gout, Depression, CVA, hx of reducible rectal prolapse now presents with non-reducible rectal prolapse. PT reports that this morning she tried having BM and rectum prolapsed and she couldn't reduce it. Denies fevers, chills, N/V, abdominal pain.     In ED, attempted bedside reduction however unsuccessful.      PAST MEDICAL & SURGICAL HISTORY:  Diastolic heart failure  Acute Ischemic Right KENY Stroke  History of Leukemia  H/O: Hypertension  Anemia  Lupus  Hypercholesteremia  Diabetes  Depression  Status post cataract extraction and insertion of intraocular lens, unspecified laterality  H/O: Hysterectomy  FAMILY HISTORY: not pertinent   Family history of cardiomyopathy    REVIEW OF SYSTEMS:  CONSTITUTIONAL: No weakness, fevers or chills  RESPIRATORY: + dry cough  CARDIOVASCULAR: No chest pain or palpitations  GASTROINTESTINAL: No abdominal or epigastric pain. No nausea, vomiting, or hematemesis; No diarrhea or constipation. No melena or hematochezia.  GENITOURINARY: No dysuria, frequency or hematuria    Vital Signs Last 24 Hrs  T(C): 37.2 (01 Oct 2019 05:06), Max: 37.2 (01 Oct 2019 05:06)  T(F): 98.9 (01 Oct 2019 05:06), Max: 98.9 (01 Oct 2019 05:06)  HR: 93 (01 Oct 2019 05:06) (77 - 96)  BP: 150/81 (01 Oct 2019 05:06) (137/79 - 171/87)  RR: 16 (01 Oct 2019 05:06) (16 - 18)  SpO2: 100% (01 Oct 2019 05:06) (100% - 100%)I&O's Detail    30 Sep 2019 07:01  -  01 Oct 2019 07:00  --------------------------------------------------------  IN:    dextrose 5% + sodium chloride 0.45% with potassium chloride 20 mEq/L: 50 mL    IV PiggyBack: 50 mL    Oral Fluid: 1000 mL  Total IN: 1100 mL    OUT:    Voided: 400 mL  Total OUT: 400 mL  Total NET: 700 mL    Medications  MEDICATIONS  (STANDING):  amLODIPine   Tablet 5 milliGRAM(s) Oral daily  dextrose 5% + sodium chloride 0.45% with potassium chloride 20 mEq/L 1000 milliLiter(s) (50 mL/Hr) IV Continuous <Continuous>  heparin  Injectable 5000 Unit(s) SubCutaneous every 8 hours  influenza   Vaccine 0.5 milliLiter(s) IntraMuscular once  losartan 100 milliGRAM(s) Oral daily  MEDICATIONS  (PRN):  acetaminophen   Tablet .. 650 milliGRAM(s) Oral every 6 hours PRN Moderate Pain (4 - 6)  artificial  tears Solution 1 Drop(s) Both EYES three times a day PRN Dry Eyes  morphine  - Injectable 2 milliGRAM(s) IV Push every 4 hours PRN Moderate Pain (4 - 6)    Physical Exam  Constitutional: A&O, NAD, Thin  Eyes: Scleras clear, PERRLA/ EOMI, Gross vision intact  Gastrointestinal: Soft nontender, nondistended, no rectal prolapse  Extremities: Moving all extremities, no edema    LABS:             11.5   4.26  )-----------( 187      ( 01 Oct 2019 06:30 )             35.7   09-30  137  |  98  |  3<L>  ----------------------------<  126<H>  3.5   |  26  |  0.38<L>  Ca    9.4      30 Sep 2019 06:15  Phos  2.7     09-30  Mg     1.5     09-30

## 2019-10-01 NOTE — PROVIDER CONTACT NOTE (OTHER) - SITUATION
pt with noted rectal prolapse slightly protuding from rectum. bowl prep in progress, multiple loose BMs this shift. no c/o pain at the rectal area. pt NPO after midnight, no IVF ordered.

## 2019-10-01 NOTE — CHART NOTE - NSCHARTNOTEFT_GEN_A_CORE
Patient is a 78y old  Female who presents with a chief complaint of Rectal prolapse     SUBJECTIVE: Patient seen and examined at bedside with surgical team, patient without complaints. Had bowel prep overnight and was passing clear liquid stool. Is now s/p colonoscopy w/ GI today that went by w/o incident. She is now returned to the general floor and is w/o complaint.    HPI: 78F w/ PMH of Lupus (on long-term prednisone), HTN, Anemia, HLD, Osteoporosis, DM, Gout, Depression, CVA, hx of reducible rectal prolapse now presents with non-reducible rectal prolapse. PT reports that this morning she tried having BM and rectum prolapsed and she couldn't reduce it. Denies fevers, chills, N/V, abdominal pain.      Objective:  Vital Signs Last 24 Hrs  T(C): 36.5 (01 Oct 2019 18:01), Max: 37.2 (01 Oct 2019 05:06)  T(F): 97.7 (01 Oct 2019 18:01), Max: 98.9 (01 Oct 2019 05:06)  HR: 72 (01 Oct 2019 18:01) (72 - 96)  BP: 119/50 (01 Oct 2019 18:01) (119/50 - 171/87)  BP(mean): --  RR: 17 (01 Oct 2019 18:01) (16 - 18)  SpO2: 100% (01 Oct 2019 18:01) (100% - 100%)    I&O's Detail    30 Sep 2019 07:01  -  01 Oct 2019 07:00  --------------------------------------------------------  IN:    dextrose 5% + sodium chloride 0.45% with potassium chloride 20 mEq/L: 50 mL    IV PiggyBack: 50 mL    Oral Fluid: 1000 mL  Total IN: 1100 mL    OUT:    Voided: 400 mL  Total OUT: 400 mL    Total NET: 700 mL      01 Oct 2019 07:01  -  01 Oct 2019 18:21  --------------------------------------------------------  IN:  Total IN: 0 mL    OUT:  Total OUT: 0 mL    Total NET: 0 mL      MEDICATIONS  (STANDING):  amLODIPine   Tablet 5 milliGRAM(s) Oral daily  dextrose 5% + sodium chloride 0.45% with potassium chloride 20 mEq/L 1000 milliLiter(s) (50 mL/Hr) IV Continuous <Continuous>  heparin  Injectable 5000 Unit(s) SubCutaneous every 8 hours  influenza   Vaccine 0.5 milliLiter(s) IntraMuscular once  lactated ringers. 1000 milliLiter(s) (75 mL/Hr) IV Continuous <Continuous>  losartan 100 milliGRAM(s) Oral daily  sodium chloride 0.9%. 1000 milliLiter(s) (75 mL/Hr) IV Continuous <Continuous>    MEDICATIONS  (PRN):  acetaminophen   Tablet .. 650 milliGRAM(s) Oral every 6 hours PRN Moderate Pain (4 - 6)  artificial  tears Solution 1 Drop(s) Both EYES three times a day PRN Dry Eyes  morphine  - Injectable 2 milliGRAM(s) IV Push every 4 hours PRN Moderate Pain (4 - 6)                            11.5   4.26  )-----------( 187      ( 01 Oct 2019 06:30 )             35.7       10-01    137  |  102  |  8   ----------------------------<  130<H>  3.5   |  20<L>  |  0.55    Ca    9.8      01 Oct 2019 06:30  Phos  3.4     10-01  Mg     1.8     10-01          Physical Exam  Constitutional: A&O, NAD, Thin  Eyes: Scleras clear, PERRLA/ EOMI, Gross vision intact  Gastrointestinal: Soft nontender, nondistended, no rectal prolapse  Extremities: Moving all extremities, no edema      Assessment and Plan:   · Assessment	  78F w/ PMH of Lupus (on long-term prednisone), HTN, Anemia, HLD, Osteoporosis, DM, Gout, Depression, CVA, now s/p successful reduction of rectal prolapse under anesthesia, going for scope today      - s/p GI scope today, recovering well  - Resume reg. diet  - OR Friday for rectopexy   - medically cleared  - Continue amlodipine and losartan for hypertension  - CT and echo per medicine, negative  - monitor for recurrence of prolapse  - DVT PPX      A Team Surgery   [id number]

## 2019-10-02 DIAGNOSIS — Z71.89 OTHER SPECIFIED COUNSELING: ICD-10-CM

## 2019-10-02 LAB
ANION GAP SERPL CALC-SCNC: 13 MMO/L — SIGNIFICANT CHANGE UP (ref 7–14)
ANION GAP SERPL CALC-SCNC: 8 MMO/L — SIGNIFICANT CHANGE UP (ref 7–14)
APPEARANCE UR: SIGNIFICANT CHANGE UP
BACTERIA # UR AUTO: NEGATIVE — SIGNIFICANT CHANGE UP
BILIRUB UR-MCNC: NEGATIVE — SIGNIFICANT CHANGE UP
BLOOD UR QL VISUAL: SIGNIFICANT CHANGE UP
BUN SERPL-MCNC: 8 MG/DL — SIGNIFICANT CHANGE UP (ref 7–23)
BUN SERPL-MCNC: 9 MG/DL — SIGNIFICANT CHANGE UP (ref 7–23)
CALCIUM SERPL-MCNC: 8.9 MG/DL — SIGNIFICANT CHANGE UP (ref 8.4–10.5)
CALCIUM SERPL-MCNC: 9.2 MG/DL — SIGNIFICANT CHANGE UP (ref 8.4–10.5)
CHLORIDE SERPL-SCNC: 102 MMOL/L — SIGNIFICANT CHANGE UP (ref 98–107)
CHLORIDE SERPL-SCNC: 105 MMOL/L — SIGNIFICANT CHANGE UP (ref 98–107)
CO2 SERPL-SCNC: 19 MMOL/L — LOW (ref 22–31)
CO2 SERPL-SCNC: 20 MMOL/L — LOW (ref 22–31)
COLOR SPEC: SIGNIFICANT CHANGE UP
CREAT SERPL-MCNC: 0.51 MG/DL — SIGNIFICANT CHANGE UP (ref 0.5–1.3)
CREAT SERPL-MCNC: 0.51 MG/DL — SIGNIFICANT CHANGE UP (ref 0.5–1.3)
GLUCOSE SERPL-MCNC: 110 MG/DL — HIGH (ref 70–99)
GLUCOSE SERPL-MCNC: 87 MG/DL — SIGNIFICANT CHANGE UP (ref 70–99)
GLUCOSE UR-MCNC: NEGATIVE — SIGNIFICANT CHANGE UP
HCT VFR BLD CALC: 30.3 % — LOW (ref 34.5–45)
HGB BLD-MCNC: 9.7 G/DL — LOW (ref 11.5–15.5)
HYALINE CASTS # UR AUTO: NEGATIVE — SIGNIFICANT CHANGE UP
KETONES UR-MCNC: NEGATIVE — SIGNIFICANT CHANGE UP
LEUKOCYTE ESTERASE UR-ACNC: SIGNIFICANT CHANGE UP
MAGNESIUM SERPL-MCNC: 1.6 MG/DL — SIGNIFICANT CHANGE UP (ref 1.6–2.6)
MAGNESIUM SERPL-MCNC: 1.8 MG/DL — SIGNIFICANT CHANGE UP (ref 1.6–2.6)
MCHC RBC-ENTMCNC: 26.8 PG — LOW (ref 27–34)
MCHC RBC-ENTMCNC: 32 % — SIGNIFICANT CHANGE UP (ref 32–36)
MCV RBC AUTO: 83.7 FL — SIGNIFICANT CHANGE UP (ref 80–100)
NITRITE UR-MCNC: NEGATIVE — SIGNIFICANT CHANGE UP
NRBC # FLD: 0 K/UL — SIGNIFICANT CHANGE UP (ref 0–0)
PH UR: 6.5 — SIGNIFICANT CHANGE UP (ref 5–8)
PHOSPHATE SERPL-MCNC: 3.5 MG/DL — SIGNIFICANT CHANGE UP (ref 2.5–4.5)
PHOSPHATE SERPL-MCNC: 3.7 MG/DL — SIGNIFICANT CHANGE UP (ref 2.5–4.5)
PLATELET # BLD AUTO: 153 K/UL — SIGNIFICANT CHANGE UP (ref 150–400)
PMV BLD: 11.5 FL — SIGNIFICANT CHANGE UP (ref 7–13)
POTASSIUM SERPL-MCNC: 3 MMOL/L — LOW (ref 3.5–5.3)
POTASSIUM SERPL-MCNC: 4 MMOL/L — SIGNIFICANT CHANGE UP (ref 3.5–5.3)
POTASSIUM SERPL-SCNC: 3 MMOL/L — LOW (ref 3.5–5.3)
POTASSIUM SERPL-SCNC: 4 MMOL/L — SIGNIFICANT CHANGE UP (ref 3.5–5.3)
PROT UR-MCNC: 10 — SIGNIFICANT CHANGE UP
RBC # BLD: 3.62 M/UL — LOW (ref 3.8–5.2)
RBC # FLD: 13.4 % — SIGNIFICANT CHANGE UP (ref 10.3–14.5)
RBC CASTS # UR COMP ASSIST: HIGH (ref 0–?)
SODIUM SERPL-SCNC: 132 MMOL/L — LOW (ref 135–145)
SODIUM SERPL-SCNC: 135 MMOL/L — SIGNIFICANT CHANGE UP (ref 135–145)
SP GR SPEC: 1.01 — SIGNIFICANT CHANGE UP (ref 1–1.04)
SQUAMOUS # UR AUTO: SIGNIFICANT CHANGE UP
TROPONIN T, HIGH SENSITIVITY: 20 NG/L — SIGNIFICANT CHANGE UP (ref ?–14)
TROPONIN T, HIGH SENSITIVITY: 21 NG/L — SIGNIFICANT CHANGE UP (ref ?–14)
UROBILINOGEN FLD QL: NORMAL — SIGNIFICANT CHANGE UP
WBC # BLD: 3.9 K/UL — SIGNIFICANT CHANGE UP (ref 3.8–10.5)
WBC # FLD AUTO: 3.9 K/UL — SIGNIFICANT CHANGE UP (ref 3.8–10.5)
WBC UR QL: >50 — HIGH (ref 0–?)

## 2019-10-02 PROCEDURE — 71045 X-RAY EXAM CHEST 1 VIEW: CPT | Mod: 26

## 2019-10-02 RX ORDER — MAGNESIUM SULFATE 500 MG/ML
2 VIAL (ML) INJECTION ONCE
Refills: 0 | Status: COMPLETED | OUTPATIENT
Start: 2019-10-02 | End: 2019-10-02

## 2019-10-02 RX ORDER — CHOLECALCIFEROL (VITAMIN D3) 125 MCG
400 CAPSULE ORAL DAILY
Refills: 0 | Status: DISCONTINUED | OUTPATIENT
Start: 2019-10-02 | End: 2019-10-04

## 2019-10-02 RX ORDER — PANTOPRAZOLE SODIUM 20 MG/1
40 TABLET, DELAYED RELEASE ORAL
Refills: 0 | Status: DISCONTINUED | OUTPATIENT
Start: 2019-10-02 | End: 2019-10-04

## 2019-10-02 RX ORDER — PANTOPRAZOLE SODIUM 20 MG/1
40 TABLET, DELAYED RELEASE ORAL DAILY
Refills: 0 | Status: DISCONTINUED | OUTPATIENT
Start: 2019-10-02 | End: 2019-10-02

## 2019-10-02 RX ORDER — POTASSIUM CHLORIDE 20 MEQ
40 PACKET (EA) ORAL EVERY 4 HOURS
Refills: 0 | Status: DISCONTINUED | OUTPATIENT
Start: 2019-10-02 | End: 2019-10-02

## 2019-10-02 RX ORDER — POTASSIUM CHLORIDE 20 MEQ
40 PACKET (EA) ORAL EVERY 4 HOURS
Refills: 0 | Status: COMPLETED | OUTPATIENT
Start: 2019-10-02 | End: 2019-10-02

## 2019-10-02 RX ADMIN — Medication 650 MILLIGRAM(S): at 13:37

## 2019-10-02 RX ADMIN — Medication 650 MILLIGRAM(S): at 13:07

## 2019-10-02 RX ADMIN — HEPARIN SODIUM 5000 UNIT(S): 5000 INJECTION INTRAVENOUS; SUBCUTANEOUS at 13:07

## 2019-10-02 RX ADMIN — Medication 50 GRAM(S): at 13:07

## 2019-10-02 RX ADMIN — AMLODIPINE BESYLATE 5 MILLIGRAM(S): 2.5 TABLET ORAL at 05:28

## 2019-10-02 RX ADMIN — Medication 40 MILLIEQUIVALENT(S): at 05:28

## 2019-10-02 RX ADMIN — LOSARTAN POTASSIUM 100 MILLIGRAM(S): 100 TABLET, FILM COATED ORAL at 05:28

## 2019-10-02 RX ADMIN — Medication 400 UNIT(S): at 13:07

## 2019-10-02 RX ADMIN — HEPARIN SODIUM 5000 UNIT(S): 5000 INJECTION INTRAVENOUS; SUBCUTANEOUS at 05:28

## 2019-10-02 RX ADMIN — Medication 40 MILLIEQUIVALENT(S): at 02:07

## 2019-10-02 RX ADMIN — HEPARIN SODIUM 5000 UNIT(S): 5000 INJECTION INTRAVENOUS; SUBCUTANEOUS at 21:10

## 2019-10-02 NOTE — PROGRESS NOTE ADULT - SUBJECTIVE AND OBJECTIVE BOX
Morning Surgical Progress Note  Patient is a 78y old  Female who presents with a chief complaint of Rectal prolapse     SUBJECTIVE: Patient seen and examined at bedside with surgical team, patient without complaints. Reports voiding well and passing gas and stool o/n. No more recurrence of prolapse o/n.    HPI: 78F w/ PMH of Lupus (on long-term prednisone), HTN, Anemia, HLD, Osteoporosis, DM, Gout, Depression, CVA, hx of reducible rectal prolapse now presents with non-reducible rectal prolapse. PT reports that this morning she tried having BM and rectum prolapsed and she couldn't reduce it. Denies fevers, chills, N/V, abdominal pain.       Objective:  Vital Signs Last 24 Hrs  T(C): 36.6 (02 Oct 2019 10:04), Max: 37 (01 Oct 2019 23:59)  T(F): 97.9 (02 Oct 2019 10:04), Max: 98.6 (01 Oct 2019 23:59)  HR: 109 (02 Oct 2019 10:04) (72 - 109)  BP: 127/90 (02 Oct 2019 10:04) (119/50 - 149/72)  BP(mean): --  RR: 20 (02 Oct 2019 10:04) (17 - 20)  SpO2: 100% (02 Oct 2019 10:04) (98% - 100%)    I&O's Detail    01 Oct 2019 07:01  -  02 Oct 2019 07:00  --------------------------------------------------------  IN:    Oral Fluid: 500 mL    sodium chloride 0.9%: 900 mL  Total IN: 1400 mL    OUT:    Voided: 200 mL  Total OUT: 200 mL    Total NET: 1200 mL          MEDICATIONS  (STANDING):  amLODIPine   Tablet 5 milliGRAM(s) Oral daily  cholecalciferol 400 Unit(s) Oral daily  heparin  Injectable 5000 Unit(s) SubCutaneous every 8 hours  influenza   Vaccine 0.5 milliLiter(s) IntraMuscular once  losartan 100 milliGRAM(s) Oral daily  pantoprazole    Tablet 40 milliGRAM(s) Oral before breakfast    MEDICATIONS  (PRN):  acetaminophen   Tablet .. 650 milliGRAM(s) Oral every 6 hours PRN Moderate Pain (4 - 6)  artificial  tears Solution 1 Drop(s) Both EYES three times a day PRN Dry Eyes  morphine  - Injectable 2 milliGRAM(s) IV Push every 4 hours PRN Moderate Pain (4 - 6)                            9.7    3.90  )-----------( 153      ( 02 Oct 2019 06:45 )             30.3       10-02    132<L>  |  105  |  8   ----------------------------<  87  4.0   |  19<L>  |  0.51    Ca    8.9      02 Oct 2019 06:45  Phos  3.7     10-02  Mg     1.6     10-02      Physical Exam  Constitutional: A&O, NAD, Thin/cachectic  Eyes: Scleras clear, PERRLA/ EOMI,   Gastrointestinal: Soft nontender, nondistended, no rectal prolapse  Extremities: Moving all extremities, no edema

## 2019-10-02 NOTE — PROGRESS NOTE ADULT - SUBJECTIVE AND OBJECTIVE BOX
INTERVAL HPI/OVERNIGHT EVENTS:    pt feeling well   no abd pain   no rectal pain     MEDICATIONS  (STANDING):  amLODIPine   Tablet 5 milliGRAM(s) Oral daily  cholecalciferol 400 Unit(s) Oral daily  heparin  Injectable 5000 Unit(s) SubCutaneous every 8 hours  influenza   Vaccine 0.5 milliLiter(s) IntraMuscular once  losartan 100 milliGRAM(s) Oral daily  magnesium sulfate  IVPB 2 Gram(s) IV Intermittent once  pantoprazole    Tablet 40 milliGRAM(s) Oral before breakfast    MEDICATIONS  (PRN):  acetaminophen   Tablet .. 650 milliGRAM(s) Oral every 6 hours PRN Moderate Pain (4 - 6)  artificial  tears Solution 1 Drop(s) Both EYES three times a day PRN Dry Eyes  morphine  - Injectable 2 milliGRAM(s) IV Push every 4 hours PRN Moderate Pain (4 - 6)      Allergies    SULFA (Unknown)  sulfa drugs (Unknown)    Intolerances        Review of Systems:    General:  No wt loss, fevers, chills, night sweats, fatigue   Eyes:  Good vision, no reported pain  ENT:  No sore throat, pain, runny nose, dysphagia  CV:  No pain, palpitations, hypo/hypertension  Resp:  No dyspnea, cough, tachypnea, wheezing  GI:  No pain, No nausea, No vomiting, No diarrhea, No constipation, No weight loss, No fever, No pruritis, No rectal bleeding, No melena, No dysphagia  :  No pain, bleeding, incontinence, nocturia  Muscle:  No pain, weakness  Neuro:  No weakness, tingling, memory problems  Psych:  No fatigue, insomnia, mood problems, depression  Endocrine:  No polyuria, polydypsia, cold/heat intolerance  Heme:  No petechiae, ecchymosis, easy bruisability  Skin:  No rash, tattoos, scars, edema      Vital Signs Last 24 Hrs  T(C): 36.6 (02 Oct 2019 10:04), Max: 37 (01 Oct 2019 23:59)  T(F): 97.9 (02 Oct 2019 10:04), Max: 98.6 (01 Oct 2019 23:59)  HR: 109 (02 Oct 2019 10:04) (72 - 109)  BP: 127/90 (02 Oct 2019 10:04) (119/50 - 148/59)  BP(mean): --  RR: 20 (02 Oct 2019 10:04) (17 - 20)  SpO2: 100% (02 Oct 2019 10:04) (98% - 100%)    PHYSICAL EXAM:    Constitutional: NAD  HEENT: EOMI, throat clear  Neck: No LAD, supple  Respiratory: CTA and P  Cardiovascular: S1 and S2, RRR, no M  Gastrointestinal: BS+, soft, NT/ND, neg HSM,  Extremities: No peripheral edema, neg clubbing, cyanosis  Vascular: 2+ peripheral pulses  Neurological: A/O x 3, no focal deficits  Psychiatric: Normal mood, normal affect  Skin: No rashes      LABS:                        9.7    3.90  )-----------( 153      ( 02 Oct 2019 06:45 )             30.3     10-02    132<L>  |  105  |  8   ----------------------------<  87  4.0   |  19<L>  |  0.51    Ca    8.9      02 Oct 2019 06:45  Phos  3.7     10-02  Mg     1.6     10-02            RADIOLOGY & ADDITIONAL TESTS:    < from: Colonoscopy (10.01.19 @ 14:26) >    [facility]  _______________________________________________________________________________  Patient Name: Daisy Ross       Procedure Date: 10/1/2019 2:26 PM  MRN: [id number]                     Account Number: [id number]  YOB: 1940              Admit Type: Inpatient  Room: ENDO 02                         Gender: Female  Attending MD: CRYSTAL RYAN DO        _______________________________________________________________________________     Procedure:           Colonoscopy  Indications:         pre-operative colonoscopy prior to rectal prolapse                        surgery  Providers:           CRYSTAL RYAN DO  Medicines:           Monitored Anesthesia Care  Complications:       No immediate complications.  Procedure:           After I obtained informed consent, the scope was passed                        under direct vision. Throughout the procedure, the                        patient's blood pressure, pulse, and oxygen saturations                      were monitored continuously. The Colonoscope was                        introduced through the anus and advanced to the cecum,                        identified by appendiceal orifice and ileocecal valve.                        Thecolonoscopy was performed without difficulty. The                        patient tolerated the procedure well. The quality of the                        bowel preparation was good.              Findings:       A diffuse area of moderately erythematous mucosa was found in the rectum.       A diffuse area of mild melanosis was found in the transverse colon, at        the hepatic flexure, in the ascending colon and in the cecum.     The exam was otherwise without abnormality.                                                                                   Impression:          - Erythematous mucosa in the rectum.                       - Melanosis in the colon.        - The examination was otherwise normal.                       - No specimens collected.  Recommendation:      - Return patient to hospital weeks for ongoing care.                       - Advance diet as tolerated.                                                    Attending Participation:       I personally performed the entire procedure.                                                                                     _________________  CRYSTAL RYAN DO  10/1/2019 3:34:16 PM  This report has been signed electronically.  Number of Addenda: 0    Note Initiated On: 10/1/2019 2:26 PM    < end of copied text >

## 2019-10-02 NOTE — PROGRESS NOTE ADULT - ASSESSMENT
78F w/ PMH of Lupus (on long-term prednisone), HTN, Anemia, HLD, Osteoporosis, DM, Gout, Depression, CVA, hx of reducible rectal prolapse admitted for further management

## 2019-10-02 NOTE — PROGRESS NOTE ADULT - SUBJECTIVE AND OBJECTIVE BOX
Patient is a 78y old  Female who presents with a chief complaint of Rectal prolapse (02 Oct 2019 12:20)      SUBJECTIVE / OVERNIGHT EVENTS: overnight events noted    ROS:  Resp: No cough no sputum production  CVS: No chest pain no palpitations no orthopnea  GI: no N/V/D  : no dysuria, no hematuria  Neuro: no weakness no paresthesias  Heme: No petechiae no easy bruising  Msk: No joint pain no swelling  Skin: No rash no itching        MEDICATIONS  (STANDING):  amLODIPine   Tablet 5 milliGRAM(s) Oral daily  cholecalciferol 400 Unit(s) Oral daily  heparin  Injectable 5000 Unit(s) SubCutaneous every 8 hours  influenza   Vaccine 0.5 milliLiter(s) IntraMuscular once  losartan 100 milliGRAM(s) Oral daily  pantoprazole    Tablet 40 milliGRAM(s) Oral before breakfast    MEDICATIONS  (PRN):  acetaminophen   Tablet .. 650 milliGRAM(s) Oral every 6 hours PRN Moderate Pain (4 - 6)  artificial  tears Solution 1 Drop(s) Both EYES three times a day PRN Dry Eyes  morphine  - Injectable 2 milliGRAM(s) IV Push every 4 hours PRN Moderate Pain (4 - 6)        CAPILLARY BLOOD GLUCOSE        I&O's Summary    01 Oct 2019 07:01  -  02 Oct 2019 07:00  --------------------------------------------------------  IN: 1400 mL / OUT: 200 mL / NET: 1200 mL    02 Oct 2019 07:01  -  02 Oct 2019 16:12  --------------------------------------------------------  IN: 300 mL / OUT: 200 mL / NET: 100 mL        Vital Signs Last 24 Hrs  T(C): 36.7 (02 Oct 2019 14:09), Max: 37 (01 Oct 2019 23:59)  T(F): 98 (02 Oct 2019 14:09), Max: 98.6 (01 Oct 2019 23:59)  HR: 99 (02 Oct 2019 14:09) (72 - 99)  BP: 146/91 (02 Oct 2019 14:09) (119/50 - 148/59)  BP(mean): --  RR: 19 (02 Oct 2019 14:09) (17 - 20)  SpO2: 98% (02 Oct 2019 14:09) (98% - 100%)    PHYSICAL EXAM:  GENERAL: NAD, cachectic  HEAD:  Atraumatic, Normocephalic  EYES: EOMI, PERRLA, conjunctiva and sclera clear  NECK: Supple, No JVD  CHEST/LUNG: Clear to auscultation bilaterally; No wheeze  HEART: S1S2; No rubs, or gallops, no murmurs  ABDOMEN: Soft, Nontender, Nondistended; Bowel sounds present  EXTREMITIES:  + Peripheral Pulses, No clubbing or cyanosis, no edema  PSYCH: AO x 3,   NEUROLOGY: mild dysarthric speech (chronic) and  left mild residual hemiparesis  SKIN: No rashes or lesions    LABS:                        9.7    3.90  )-----------( 153      ( 02 Oct 2019 06:45 )             30.3     10-02    132<L>  |  105  |  8   ----------------------------<  87  4.0   |  19<L>  |  0.51    Ca    8.9      02 Oct 2019 06:45  Phos  3.7     10-02  Mg     1.6     10-02                  All consultant(s) notes reviewed and care discussed with other providers        Contact Number, Dr Bishop [phone]

## 2019-10-02 NOTE — PROGRESS NOTE ADULT - ASSESSMENT
78F w/ PMH of Lupus (on long-term prednisone), HTN, Anemia, HLD, Osteoporosis, DM, Gout, Depression, CVA, now s/p successful reduction of rectal prolapse under anesthesia, going for scope today    - GI scope unremarkable yest. except for melanosis and some irritated segments  - LRD  - OR Friday for rectopexy (tentative)  - medically cleared  - Continue amlodipine and losartan for hypertension  - CT and echo per medicine, negative  - monitor for recurrence of prolapse  - DVT PPX  - Add vit. D and Vit. B  - May d/c home today if unable to schedule pt. for OR Friday, then reschedule surgery at that time as outpt      A Team Surgery   [id number]

## 2019-10-03 ENCOUNTER — TRANSCRIPTION ENCOUNTER (OUTPATIENT)
Age: 79
End: 2019-10-03

## 2019-10-03 LAB
ANION GAP SERPL CALC-SCNC: 10 MMO/L — SIGNIFICANT CHANGE UP (ref 7–14)
ANION GAP SERPL CALC-SCNC: 13 MMO/L — SIGNIFICANT CHANGE UP (ref 7–14)
APTT BLD: 32.4 SEC — SIGNIFICANT CHANGE UP (ref 27.5–36.3)
BLD GP AB SCN SERPL QL: POSITIVE — SIGNIFICANT CHANGE UP
BUN SERPL-MCNC: 5 MG/DL — LOW (ref 7–23)
BUN SERPL-MCNC: 6 MG/DL — LOW (ref 7–23)
CALCIUM SERPL-MCNC: 9.1 MG/DL — SIGNIFICANT CHANGE UP (ref 8.4–10.5)
CALCIUM SERPL-MCNC: 9.1 MG/DL — SIGNIFICANT CHANGE UP (ref 8.4–10.5)
CHLORIDE SERPL-SCNC: 101 MMOL/L — SIGNIFICANT CHANGE UP (ref 98–107)
CHLORIDE SERPL-SCNC: 103 MMOL/L — SIGNIFICANT CHANGE UP (ref 98–107)
CO2 SERPL-SCNC: 21 MMOL/L — LOW (ref 22–31)
CO2 SERPL-SCNC: 23 MMOL/L — SIGNIFICANT CHANGE UP (ref 22–31)
CORTIS SERPL-MCNC: 15.5 UG/DL — SIGNIFICANT CHANGE UP (ref 2.7–18.4)
CREAT SERPL-MCNC: 0.45 MG/DL — LOW (ref 0.5–1.3)
CREAT SERPL-MCNC: 0.45 MG/DL — LOW (ref 0.5–1.3)
DAT C3-SP REAG RBC QL: POSITIVE — SIGNIFICANT CHANGE UP
DAT POLY-SP REAG RBC QL: POSITIVE — SIGNIFICANT CHANGE UP
DIRECT COOMBS IGG: POSITIVE — SIGNIFICANT CHANGE UP
GLUCOSE SERPL-MCNC: 101 MG/DL — HIGH (ref 70–99)
GLUCOSE SERPL-MCNC: 82 MG/DL — SIGNIFICANT CHANGE UP (ref 70–99)
HCT VFR BLD CALC: 34.1 % — LOW (ref 34.5–45)
HGB BLD-MCNC: 10.6 G/DL — LOW (ref 11.5–15.5)
INR BLD: 1.13 — SIGNIFICANT CHANGE UP (ref 0.88–1.17)
MAGNESIUM SERPL-MCNC: 1.5 MG/DL — LOW (ref 1.6–2.6)
MAGNESIUM SERPL-MCNC: 1.9 MG/DL — SIGNIFICANT CHANGE UP (ref 1.6–2.6)
MCHC RBC-ENTMCNC: 26.2 PG — LOW (ref 27–34)
MCHC RBC-ENTMCNC: 31.1 % — LOW (ref 32–36)
MCV RBC AUTO: 84.2 FL — SIGNIFICANT CHANGE UP (ref 80–100)
NRBC # FLD: 0 K/UL — SIGNIFICANT CHANGE UP (ref 0–0)
PHOSPHATE SERPL-MCNC: 2.8 MG/DL — SIGNIFICANT CHANGE UP (ref 2.5–4.5)
PHOSPHATE SERPL-MCNC: 3.4 MG/DL — SIGNIFICANT CHANGE UP (ref 2.5–4.5)
PLATELET # BLD AUTO: 163 K/UL — SIGNIFICANT CHANGE UP (ref 150–400)
PMV BLD: 11.2 FL — SIGNIFICANT CHANGE UP (ref 7–13)
POTASSIUM SERPL-MCNC: 3.1 MMOL/L — LOW (ref 3.5–5.3)
POTASSIUM SERPL-MCNC: 4.1 MMOL/L — SIGNIFICANT CHANGE UP (ref 3.5–5.3)
POTASSIUM SERPL-SCNC: 3.1 MMOL/L — LOW (ref 3.5–5.3)
POTASSIUM SERPL-SCNC: 4.1 MMOL/L — SIGNIFICANT CHANGE UP (ref 3.5–5.3)
PROTHROM AB SERPL-ACNC: 12.6 SEC — SIGNIFICANT CHANGE UP (ref 9.8–13.1)
RBC # BLD: 4.05 M/UL — SIGNIFICANT CHANGE UP (ref 3.8–5.2)
RBC # FLD: 13.2 % — SIGNIFICANT CHANGE UP (ref 10.3–14.5)
RH IG SCN BLD-IMP: POSITIVE — SIGNIFICANT CHANGE UP
SODIUM SERPL-SCNC: 135 MMOL/L — SIGNIFICANT CHANGE UP (ref 135–145)
SODIUM SERPL-SCNC: 136 MMOL/L — SIGNIFICANT CHANGE UP (ref 135–145)
WBC # BLD: 3.55 K/UL — LOW (ref 3.8–10.5)
WBC # FLD AUTO: 3.55 K/UL — LOW (ref 3.8–10.5)

## 2019-10-03 RX ORDER — NEOMYCIN SULFATE 500 MG/1
1 TABLET ORAL ONCE
Refills: 0 | Status: COMPLETED | OUTPATIENT
Start: 2019-10-03 | End: 2019-10-03

## 2019-10-03 RX ORDER — SODIUM CHLORIDE 9 MG/ML
1000 INJECTION, SOLUTION INTRAVENOUS
Refills: 0 | Status: DISCONTINUED | OUTPATIENT
Start: 2019-10-03 | End: 2019-10-03

## 2019-10-03 RX ORDER — POTASSIUM CHLORIDE 20 MEQ
40 PACKET (EA) ORAL ONCE
Refills: 0 | Status: COMPLETED | OUTPATIENT
Start: 2019-10-03 | End: 2019-10-03

## 2019-10-03 RX ORDER — METRONIDAZOLE 500 MG
1000 TABLET ORAL ONCE
Refills: 0 | Status: COMPLETED | OUTPATIENT
Start: 2019-10-03 | End: 2019-10-03

## 2019-10-03 RX ORDER — MAGNESIUM SULFATE 500 MG/ML
2 VIAL (ML) INJECTION ONCE
Refills: 0 | Status: COMPLETED | OUTPATIENT
Start: 2019-10-03 | End: 2019-10-03

## 2019-10-03 RX ORDER — SOD SULF/SODIUM/NAHCO3/KCL/PEG
2 SOLUTION, RECONSTITUTED, ORAL ORAL ONCE
Refills: 0 | Status: DISCONTINUED | OUTPATIENT
Start: 2019-10-03 | End: 2019-10-03

## 2019-10-03 RX ORDER — DEXTROSE MONOHYDRATE, SODIUM CHLORIDE, AND POTASSIUM CHLORIDE 50; .745; 4.5 G/1000ML; G/1000ML; G/1000ML
1000 INJECTION, SOLUTION INTRAVENOUS
Refills: 0 | Status: DISCONTINUED | OUTPATIENT
Start: 2019-10-03 | End: 2019-10-04

## 2019-10-03 RX ORDER — POTASSIUM CHLORIDE 20 MEQ
10 PACKET (EA) ORAL
Refills: 0 | Status: COMPLETED | OUTPATIENT
Start: 2019-10-03 | End: 2019-10-03

## 2019-10-03 RX ORDER — SOD SULF/SODIUM/NAHCO3/KCL/PEG
1000 SOLUTION, RECONSTITUTED, ORAL ORAL ONCE
Refills: 0 | Status: COMPLETED | OUTPATIENT
Start: 2019-10-03 | End: 2019-10-03

## 2019-10-03 RX ADMIN — DEXTROSE MONOHYDRATE, SODIUM CHLORIDE, AND POTASSIUM CHLORIDE 75 MILLILITER(S): 50; .745; 4.5 INJECTION, SOLUTION INTRAVENOUS at 18:00

## 2019-10-03 RX ADMIN — Medication 100 MILLIEQUIVALENT(S): at 11:33

## 2019-10-03 RX ADMIN — NEOMYCIN SULFATE 1 GRAM(S): 500 TABLET ORAL at 13:47

## 2019-10-03 RX ADMIN — Medication 400 UNIT(S): at 23:20

## 2019-10-03 RX ADMIN — HEPARIN SODIUM 5000 UNIT(S): 5000 INJECTION INTRAVENOUS; SUBCUTANEOUS at 23:19

## 2019-10-03 RX ADMIN — SODIUM CHLORIDE 50 MILLILITER(S): 9 INJECTION, SOLUTION INTRAVENOUS at 11:33

## 2019-10-03 RX ADMIN — Medication 1000 MILLIGRAM(S): at 15:04

## 2019-10-03 RX ADMIN — LOSARTAN POTASSIUM 100 MILLIGRAM(S): 100 TABLET, FILM COATED ORAL at 05:51

## 2019-10-03 RX ADMIN — Medication 1000 MILLILITER(S): at 19:45

## 2019-10-03 RX ADMIN — AMLODIPINE BESYLATE 5 MILLIGRAM(S): 2.5 TABLET ORAL at 05:51

## 2019-10-03 RX ADMIN — Medication 40 MILLIEQUIVALENT(S): at 17:36

## 2019-10-03 RX ADMIN — Medication 1000 MILLIGRAM(S): at 23:20

## 2019-10-03 RX ADMIN — Medication 1000 MILLIGRAM(S): at 13:41

## 2019-10-03 RX ADMIN — NEOMYCIN SULFATE 1 GRAM(S): 500 TABLET ORAL at 15:03

## 2019-10-03 RX ADMIN — NEOMYCIN SULFATE 1 GRAM(S): 500 TABLET ORAL at 23:20

## 2019-10-03 RX ADMIN — Medication 100 MILLIEQUIVALENT(S): at 16:53

## 2019-10-03 RX ADMIN — HEPARIN SODIUM 5000 UNIT(S): 5000 INJECTION INTRAVENOUS; SUBCUTANEOUS at 13:42

## 2019-10-03 RX ADMIN — Medication 100 MILLIEQUIVALENT(S): at 15:12

## 2019-10-03 RX ADMIN — HEPARIN SODIUM 5000 UNIT(S): 5000 INJECTION INTRAVENOUS; SUBCUTANEOUS at 05:51

## 2019-10-03 RX ADMIN — PANTOPRAZOLE SODIUM 40 MILLIGRAM(S): 20 TABLET, DELAYED RELEASE ORAL at 05:51

## 2019-10-03 RX ADMIN — Medication 1000 MILLILITER(S): at 15:42

## 2019-10-03 RX ADMIN — Medication 50 GRAM(S): at 13:48

## 2019-10-03 RX ADMIN — DEXTROSE MONOHYDRATE, SODIUM CHLORIDE, AND POTASSIUM CHLORIDE 75 MILLILITER(S): 50; .745; 4.5 INJECTION, SOLUTION INTRAVENOUS at 17:49

## 2019-10-03 NOTE — PROGRESS NOTE ADULT - ASSESSMENT
78F w/ PMH of Lupus (on long-term prednisone), HTN, Anemia, HLD, Osteoporosis, DM, Gout, Depression, CVA, now s/p successful reduction of rectal prolapse under anesthesia, going for scope today    - GI scope unremarkable yest. except for melanosis and some irritated segments  - CLD  - NPO after mn  - IVF  - OR Friday for rectopexy (tentative)  - To obtain consent today 10/3  - Bowel prep TBD  - medically cleared  - Continue amlodipine and losartan for hypertension  - CT and echo per medicine, negative  - monitor for recurrence of prolapse  - DVT PPX  - Add vit. D and Vit. B  - Repleted K Mg for hypokalemia and hypophosphatemia on labs    A Team Surgery   [id number] 78F w/ PMH of Lupus (on long-term prednisone), HTN, Anemia, HLD, Osteoporosis, DM, Gout, Depression, CVA, now s/p successful reduction of rectal prolapse under anesthesia    Plan:  - GI scope unremarkable yest. except for melanosis and some irritated segments  - CLD  - NPO after mn  - IVF  - OR Friday for rectopexy (tentative)  - To obtain consent today 10/3  - Bowel prep miralax zac flagyl  - medically cleared  - Continue amlodipine and losartan for hypertension  - CT and echo per medicine, negative  - monitor for recurrence of prolapse  - DVT PPX  - Add vit. D and Vit. B  - Repleted K Mg for hypokalemia and hypophosphatemia on labs    A Team Surgery   [id number]

## 2019-10-03 NOTE — CHART NOTE - NSCHARTNOTEFT_GEN_A_CORE
Surgery A Team Pre-Op Note    Patient is pre-op for Open Rectopexy for Rectal Prolapse tomorrow (10/4/19).    PMH: Lupus (on long-term prednisone), HTN, Anemia, HLD, Osteoporosis, DM, Gout, Depression, CVA Acute Ischemic Right KENY Stroke, Anemia , Depression, Diastolic heart failure, Leukemia     PSH: Hysterectomy, cataract surgery and insertion of intraocular lens    All: sulfa    CBC (10-03 @ 05:35)                          10.6<L>                   3.55<L>  )--------------(  163        --    % Neuts, --    % Lymphs, ANC: --                              34.1<L>  CBC (10-02 @ 06:45)                          9.7<L>                   3.90    )--------------(  153        --    % Neuts, --    % Lymphs, ANC: --                              30.3<L>    BMP (10-03 @ 05:35)       136     |  103     |  5<L>  			Ca++ --      Ca 9.1          ---------------------------------( 82    		Mg 1.5<L>       3.1<L>  |  23      |  0.45<L>			Ph 3.4     BMP (10-02 @ 06:45)       132<L>  |  105     |  8     			Ca++ --      Ca 8.9          ---------------------------------( 87    		Mg 1.6          4.0     |  19<L>   |  0.51  			Ph 3.7         Coags (10-03 @ 05:35)  aPTT 32.4 / INR 1.13 / PT 12.6    Urinalysis (10-02 @ 20:30):     Color: LIGHT YELLOW / Appearance: Lt TURBID / S.009 / pH: 6.5 / Gluc: NEGATIVE / Ketones: NEGATIVE / Bili: NEGATIVE / Urobili: NORMAL / Protein :10 / Nitrites: NEGATIVE / Leuk.Est: LARGE / RBC: 6-10<H> / WBC: >50<H> / Sq Epi: OCC / Non Sq Epi:  / Bacteria NEGATIVE       CXR: 10/2/19 Mild Left Basilar Atelectasis  Medically cleared by Dr. Bishop for surgery after Echo was completed  NPO except meds p MN (patient does not take any DM medications)  D5 1/2NS +20KCL @ 75  Moviprep 2 L   Neomycin/Flagyl 1p/2p/11p   Consent in chart  Anesthesia to see patient Surgery A Team Pre-Op Note    Patient is pre-op for Open Rectopexy for Rectal Prolapse tomorrow (10/4/19).    PMH: Lupus (on long-term prednisone), HTN, Anemia, HLD, Osteoporosis, DM, Gout, Depression, CVA Acute Ischemic Right KENY Stroke, Anemia , Depression, Diastolic heart failure, Leukemia     PSH: Hysterectomy, cataract surgery and insertion of intraocular lens    All: sulfa    CBC (10-03 @ 05:35)                          10.6<L>                   3.55<L>  )--------------(  163        --    % Neuts, --    % Lymphs, ANC: --                              34.1<L>  CBC (10-02 @ 06:45)                          9.7<L>                   3.90    )--------------(  153        --    % Neuts, --    % Lymphs, ANC: --                              30.3<L>    BMP (10-03 @ 05:35)       136     |  103     |  5<L>  			Ca++ --      Ca 9.1          ---------------------------------( 82    		Mg 1.5<L>       3.1<L>  |  23      |  0.45<L>			Ph 3.4     BMP (10-02 @ 06:45)       132<L>  |  105     |  8     			Ca++ --      Ca 8.9          ---------------------------------( 87    		Mg 1.6          4.0     |  19<L>   |  0.51  			Ph 3.7         Coags (10-03 @ 05:35)  aPTT 32.4 / INR 1.13 / PT 12.6    Urinalysis (10-02 @ 20:30):     Color: LIGHT YELLOW / Appearance: Lt TURBID / S.009 / pH: 6.5 / Gluc: NEGATIVE / Ketones: NEGATIVE / Bili: NEGATIVE / Urobili: NORMAL / Protein :10 / Nitrites: NEGATIVE / Leuk.Est: LARGE / RBC: 6-10<H> / WBC: >50<H> / Sq Epi: OCC / Non Sq Epi:  / Bacteria NEGATIVE         Potassium being rechecked after IV and oral repletions completed for hypokalemia/mypomagnesemia  CXR: 10/2/19 Mild Left Basilar Atelectasis  Medically cleared by Dr. Bishop for surgery after Echo was completed  NPO except meds p MN (patient does not take any DM medications)  D5 1/2NS +20KCL @ 75  Moviprep 2 L   Neomycin/Flagyl 1p/2p/11p   Consent in chart  Anesthesia to see patient

## 2019-10-03 NOTE — PROGRESS NOTE ADULT - SUBJECTIVE AND OBJECTIVE BOX
INTERVAL HPI/OVERNIGHT EVENTS:    doing well   no n/v   no rectal pain   no abd pain     MEDICATIONS  (STANDING):  amLODIPine   Tablet 5 milliGRAM(s) Oral daily  cholecalciferol 400 Unit(s) Oral daily  heparin  Injectable 5000 Unit(s) SubCutaneous every 8 hours  influenza   Vaccine 0.5 milliLiter(s) IntraMuscular once  lactated ringers. 1000 milliLiter(s) (50 mL/Hr) IV Continuous <Continuous>  losartan 100 milliGRAM(s) Oral daily  magnesium sulfate  IVPB 2 Gram(s) IV Intermittent once  metroNIDAZOLE    Tablet 1000 milliGRAM(s) Oral once  metroNIDAZOLE    Tablet 1000 milliGRAM(s) Oral once  metroNIDAZOLE    Tablet 1000 milliGRAM(s) Oral once  neomycin 1 Gram(s) Oral once  neomycin 1 Gram(s) Oral once  neomycin 1 Gram(s) Oral once  pantoprazole    Tablet 40 milliGRAM(s) Oral before breakfast  polyethylene glycol/electrolyte Solution 2 Liter(s) Oral once  potassium chloride   Solution 40 milliEquivalent(s) Oral once  potassium chloride  10 mEq/100 mL IVPB 10 milliEquivalent(s) IV Intermittent every 1 hour    MEDICATIONS  (PRN):  acetaminophen   Tablet .. 650 milliGRAM(s) Oral every 6 hours PRN Moderate Pain (4 - 6)  artificial  tears Solution 1 Drop(s) Both EYES three times a day PRN Dry Eyes  morphine  - Injectable 2 milliGRAM(s) IV Push every 4 hours PRN Moderate Pain (4 - 6)      Allergies    SULFA (Unknown)  sulfa drugs (Unknown)    Intolerances        Review of Systems:    General:  No wt loss, fevers, chills, night sweats, fatigue   Eyes:  Good vision, no reported pain  ENT:  No sore throat, pain, runny nose, dysphagia  CV:  No pain, palpitations, hypo/hypertension  Resp:  No dyspnea, cough, tachypnea, wheezing  GI:  No pain, No nausea, No vomiting, No diarrhea, No constipation, No weight loss, No fever, No pruritis, No rectal bleeding, No melena, No dysphagia  :  No pain, bleeding, incontinence, nocturia  Muscle:  No pain, weakness  Neuro:  No weakness, tingling, memory problems  Psych:  No fatigue, insomnia, mood problems, depression  Endocrine:  No polyuria, polydypsia, cold/heat intolerance  Heme:  No petechiae, ecchymosis, easy bruisability  Skin:  No rash, tattoos, scars, edema      Vital Signs Last 24 Hrs  T(C): 36.9 (03 Oct 2019 09:59), Max: 36.9 (02 Oct 2019 18:09)  T(F): 98.4 (03 Oct 2019 09:59), Max: 98.5 (02 Oct 2019 18:09)  HR: 86 (03 Oct 2019 09:59) (74 - 101)  BP: 140/62 (03 Oct 2019 09:59) (127/49 - 160/73)  BP(mean): --  RR: 20 (03 Oct 2019 09:59) (18 - 20)  SpO2: 100% (03 Oct 2019 09:59) (98% - 100%)    PHYSICAL EXAM:    Constitutional: NAD  HEENT: EOMI, throat clear  Neck: No LAD, supple  Respiratory: CTA and P  Cardiovascular: S1 and S2, RRR, no M  Gastrointestinal: BS+, soft, NT/ND, neg HSM,  Extremities: No peripheral edema, neg clubbing, cyanosis  Vascular: 2+ peripheral pulses  Neurological: A/O x 3, no focal deficits  Psychiatric: Normal mood, normal affect  Skin: No rashes      LABS:                        10.6   3.55  )-----------( 163      ( 03 Oct 2019 05:35 )             34.1     10-03    136  |  103  |  5<L>  ----------------------------<  82  3.1<L>   |  23  |  0.45<L>    Ca    9.1      03 Oct 2019 05:35  Phos  3.4     10-03  Mg     1.5     10-03      PT/INR - ( 03 Oct 2019 05:35 )   PT: 12.6 SEC;   INR: 1.13          PTT - ( 03 Oct 2019 05:35 )  PTT:32.4 SEC  Urinalysis Basic - ( 02 Oct 2019 20:30 )    Color: LIGHT YELLOW / Appearance: Lt TURBID / S.009 / pH: 6.5  Gluc: NEGATIVE / Ketone: NEGATIVE  / Bili: NEGATIVE / Urobili: NORMAL   Blood: TRACE / Protein: 10 / Nitrite: NEGATIVE   Leuk Esterase: LARGE / RBC: 6-10 / WBC >50   Sq Epi: OCC / Non Sq Epi: x / Bacteria: NEGATIVE        RADIOLOGY & ADDITIONAL TESTS:

## 2019-10-03 NOTE — PROGRESS NOTE ADULT - SUBJECTIVE AND OBJECTIVE BOX
GENERAL SURGERY DAILY PROGRESS NOTE:    Interval:  No acute events overnight endorsed.    Subjective:  Patient seen and examined this am.     Vital Signs Last 24 Hrs  T(C): 36.9 (03 Oct 2019 09:59), Max: 36.9 (02 Oct 2019 18:09)  T(F): 98.4 (03 Oct 2019 09:59), Max: 98.5 (02 Oct 2019 18:09)  HR: 86 (03 Oct 2019 09:59) (74 - 101)  BP: 140/62 (03 Oct 2019 09:59) (127/49 - 160/73)  BP(mean): --  RR: 20 (03 Oct 2019 09:59) (18 - 20)  SpO2: 100% (03 Oct 2019 09:59) (98% - 100%)    Exam:  Gen: NAD, resting in bed, alert and responding appropriately  Resp: Airway patent, non-labored respirations  Abd: Soft, ND, NTTP x 4 quadrants, no rebound or guarding.   Ext: No edema, WWP  Neuro: AAOx3, no focal deficits    I&O's Detail    02 Oct 2019 07:01  -  03 Oct 2019 07:00  --------------------------------------------------------  IN:    Oral Fluid: 400 mL  Total IN: 400 mL    OUT:    Voided: 320 mL  Total OUT: 320 mL    Total NET: 80 mL          Daily     Daily Weight in k.6 (03 Oct 2019 05:50)    MEDICATIONS  (STANDING):  amLODIPine   Tablet 5 milliGRAM(s) Oral daily  cholecalciferol 400 Unit(s) Oral daily  heparin  Injectable 5000 Unit(s) SubCutaneous every 8 hours  influenza   Vaccine 0.5 milliLiter(s) IntraMuscular once  lactated ringers. 1000 milliLiter(s) (50 mL/Hr) IV Continuous <Continuous>  losartan 100 milliGRAM(s) Oral daily  magnesium sulfate  IVPB 2 Gram(s) IV Intermittent once  pantoprazole    Tablet 40 milliGRAM(s) Oral before breakfast  potassium chloride   Solution 40 milliEquivalent(s) Oral once  potassium chloride  10 mEq/100 mL IVPB 10 milliEquivalent(s) IV Intermittent every 1 hour    MEDICATIONS  (PRN):  acetaminophen   Tablet .. 650 milliGRAM(s) Oral every 6 hours PRN Moderate Pain (4 - 6)  artificial  tears Solution 1 Drop(s) Both EYES three times a day PRN Dry Eyes  morphine  - Injectable 2 milliGRAM(s) IV Push every 4 hours PRN Moderate Pain (4 - 6)      LABS:                        10.6   3.55  )-----------( 163      ( 03 Oct 2019 05:35 )             34.1     10-    136  |  103  |  5<L>  ----------------------------<  82  3.1<L>   |  23  |  0.45<L>    Ca    9.1      03 Oct 2019 05:35  Phos  3.4     10-  Mg     1.5     10-      PT/INR - ( 03 Oct 2019 05:35 )   PT: 12.6 SEC;   INR: 1.13          PTT - ( 03 Oct 2019 05:35 )  PTT:32.4 SEC  Urinalysis Basic - ( 02 Oct 2019 20:30 )    Color: LIGHT YELLOW / Appearance: Lt TURBID / S.009 / pH: 6.5  Gluc: NEGATIVE / Ketone: NEGATIVE  / Bili: NEGATIVE / Urobili: NORMAL   Blood: TRACE / Protein: 10 / Nitrite: NEGATIVE   Leuk Esterase: LARGE / RBC: 6-10 / WBC >50   Sq Epi: OCC / Non Sq Epi: x / Bacteria: NEGATIVE        JAGRUTI Foster, PGY-1  A Team Surgery  [id number] with any questions

## 2019-10-04 ENCOUNTER — APPOINTMENT (OUTPATIENT)
Dept: COLORECTAL SURGERY | Facility: HOSPITAL | Age: 79
End: 2019-10-04
Payer: MEDICARE

## 2019-10-04 LAB
ANION GAP SERPL CALC-SCNC: 10 MMO/L — SIGNIFICANT CHANGE UP (ref 7–14)
BUN SERPL-MCNC: 7 MG/DL — SIGNIFICANT CHANGE UP (ref 7–23)
CALCIUM SERPL-MCNC: 8.9 MG/DL — SIGNIFICANT CHANGE UP (ref 8.4–10.5)
CHLORIDE SERPL-SCNC: 110 MMOL/L — HIGH (ref 98–107)
CO2 SERPL-SCNC: 16 MMOL/L — LOW (ref 22–31)
CREAT SERPL-MCNC: 0.48 MG/DL — LOW (ref 0.5–1.3)
GLUCOSE BLDC GLUCOMTR-MCNC: 195 MG/DL — HIGH (ref 70–99)
GLUCOSE SERPL-MCNC: 143 MG/DL — HIGH (ref 70–99)
HCT VFR BLD CALC: 34.2 % — LOW (ref 34.5–45)
HGB BLD-MCNC: 10.8 G/DL — LOW (ref 11.5–15.5)
MAGNESIUM SERPL-MCNC: 1.7 MG/DL — SIGNIFICANT CHANGE UP (ref 1.6–2.6)
MCHC RBC-ENTMCNC: 26.8 PG — LOW (ref 27–34)
MCHC RBC-ENTMCNC: 31.6 % — LOW (ref 32–36)
MCV RBC AUTO: 84.9 FL — SIGNIFICANT CHANGE UP (ref 80–100)
NRBC # FLD: 0 K/UL — SIGNIFICANT CHANGE UP (ref 0–0)
PHOSPHATE SERPL-MCNC: 2.5 MG/DL — SIGNIFICANT CHANGE UP (ref 2.5–4.5)
PLATELET # BLD AUTO: 172 K/UL — SIGNIFICANT CHANGE UP (ref 150–400)
PMV BLD: 10.6 FL — SIGNIFICANT CHANGE UP (ref 7–13)
POTASSIUM SERPL-MCNC: 4.1 MMOL/L — SIGNIFICANT CHANGE UP (ref 3.5–5.3)
POTASSIUM SERPL-SCNC: 4.1 MMOL/L — SIGNIFICANT CHANGE UP (ref 3.5–5.3)
RBC # BLD: 4.03 M/UL — SIGNIFICANT CHANGE UP (ref 3.8–5.2)
RBC # FLD: 13.3 % — SIGNIFICANT CHANGE UP (ref 10.3–14.5)
SODIUM SERPL-SCNC: 136 MMOL/L — SIGNIFICANT CHANGE UP (ref 135–145)
WBC # BLD: 4.55 K/UL — SIGNIFICANT CHANGE UP (ref 3.8–10.5)
WBC # FLD AUTO: 4.55 K/UL — SIGNIFICANT CHANGE UP (ref 3.8–10.5)

## 2019-10-04 PROCEDURE — 99232 SBSQ HOSP IP/OBS MODERATE 35: CPT

## 2019-10-04 PROCEDURE — 45540 CORRECT RECTAL PROLAPSE: CPT | Mod: 78

## 2019-10-04 PROCEDURE — XXXXX: CPT

## 2019-10-04 RX ORDER — HEPARIN SODIUM 5000 [USP'U]/ML
5000 INJECTION INTRAVENOUS; SUBCUTANEOUS EVERY 8 HOURS
Refills: 0 | Status: DISCONTINUED | OUTPATIENT
Start: 2019-10-04 | End: 2019-10-14

## 2019-10-04 RX ORDER — DEXTROSE 50 % IN WATER 50 %
15 SYRINGE (ML) INTRAVENOUS ONCE
Refills: 0 | Status: DISCONTINUED | OUTPATIENT
Start: 2019-10-04 | End: 2019-10-14

## 2019-10-04 RX ORDER — PANTOPRAZOLE SODIUM 20 MG/1
40 TABLET, DELAYED RELEASE ORAL
Refills: 0 | Status: DISCONTINUED | OUTPATIENT
Start: 2019-10-04 | End: 2019-10-14

## 2019-10-04 RX ORDER — ACETAMINOPHEN 500 MG
700 TABLET ORAL ONCE
Refills: 0 | Status: COMPLETED | OUTPATIENT
Start: 2019-10-05 | End: 2019-10-05

## 2019-10-04 RX ORDER — ACETAMINOPHEN 500 MG
700 TABLET ORAL ONCE
Refills: 0 | Status: COMPLETED | OUTPATIENT
Start: 2019-10-04 | End: 2019-10-04

## 2019-10-04 RX ORDER — SODIUM CHLORIDE 9 MG/ML
1000 INJECTION, SOLUTION INTRAVENOUS
Refills: 0 | Status: DISCONTINUED | OUTPATIENT
Start: 2019-10-04 | End: 2019-10-14

## 2019-10-04 RX ORDER — DEXTROSE 50 % IN WATER 50 %
12.5 SYRINGE (ML) INTRAVENOUS ONCE
Refills: 0 | Status: DISCONTINUED | OUTPATIENT
Start: 2019-10-04 | End: 2019-10-14

## 2019-10-04 RX ORDER — DEXTROSE 50 % IN WATER 50 %
25 SYRINGE (ML) INTRAVENOUS ONCE
Refills: 0 | Status: DISCONTINUED | OUTPATIENT
Start: 2019-10-04 | End: 2019-10-14

## 2019-10-04 RX ORDER — INSULIN LISPRO 100/ML
VIAL (ML) SUBCUTANEOUS
Refills: 0 | Status: DISCONTINUED | OUTPATIENT
Start: 2019-10-04 | End: 2019-10-07

## 2019-10-04 RX ORDER — MAGNESIUM SULFATE 500 MG/ML
2 VIAL (ML) INJECTION ONCE
Refills: 0 | Status: COMPLETED | OUTPATIENT
Start: 2019-10-04 | End: 2019-10-04

## 2019-10-04 RX ORDER — AMLODIPINE BESYLATE 2.5 MG/1
5 TABLET ORAL DAILY
Refills: 0 | Status: DISCONTINUED | OUTPATIENT
Start: 2019-10-04 | End: 2019-10-14

## 2019-10-04 RX ORDER — ACETAMINOPHEN 500 MG
1000 TABLET ORAL ONCE
Refills: 0 | Status: DISCONTINUED | OUTPATIENT
Start: 2019-10-04 | End: 2019-10-04

## 2019-10-04 RX ORDER — GLUCAGON INJECTION, SOLUTION 0.5 MG/.1ML
1 INJECTION, SOLUTION SUBCUTANEOUS ONCE
Refills: 0 | Status: DISCONTINUED | OUTPATIENT
Start: 2019-10-04 | End: 2019-10-14

## 2019-10-04 RX ORDER — MORPHINE SULFATE 50 MG/1
2 CAPSULE, EXTENDED RELEASE ORAL EVERY 4 HOURS
Refills: 0 | Status: DISCONTINUED | OUTPATIENT
Start: 2019-10-04 | End: 2019-10-09

## 2019-10-04 RX ORDER — HYDROMORPHONE HYDROCHLORIDE 2 MG/ML
0.4 INJECTION INTRAMUSCULAR; INTRAVENOUS; SUBCUTANEOUS
Refills: 0 | Status: DISCONTINUED | OUTPATIENT
Start: 2019-10-04 | End: 2019-10-05

## 2019-10-04 RX ORDER — CHOLECALCIFEROL (VITAMIN D3) 125 MCG
400 CAPSULE ORAL DAILY
Refills: 0 | Status: DISCONTINUED | OUTPATIENT
Start: 2019-10-04 | End: 2019-10-14

## 2019-10-04 RX ORDER — LOSARTAN POTASSIUM 100 MG/1
100 TABLET, FILM COATED ORAL DAILY
Refills: 0 | Status: DISCONTINUED | OUTPATIENT
Start: 2019-10-04 | End: 2019-10-14

## 2019-10-04 RX ORDER — DEXTROSE MONOHYDRATE, SODIUM CHLORIDE, AND POTASSIUM CHLORIDE 50; .745; 4.5 G/1000ML; G/1000ML; G/1000ML
1000 INJECTION, SOLUTION INTRAVENOUS
Refills: 0 | Status: DISCONTINUED | OUTPATIENT
Start: 2019-10-04 | End: 2019-10-07

## 2019-10-04 RX ADMIN — DEXTROSE MONOHYDRATE, SODIUM CHLORIDE, AND POTASSIUM CHLORIDE 75 MILLILITER(S): 50; .745; 4.5 INJECTION, SOLUTION INTRAVENOUS at 21:49

## 2019-10-04 RX ADMIN — HYDROMORPHONE HYDROCHLORIDE 0.4 MILLIGRAM(S): 2 INJECTION INTRAMUSCULAR; INTRAVENOUS; SUBCUTANEOUS at 22:52

## 2019-10-04 RX ADMIN — LOSARTAN POTASSIUM 100 MILLIGRAM(S): 100 TABLET, FILM COATED ORAL at 07:09

## 2019-10-04 RX ADMIN — HYDROMORPHONE HYDROCHLORIDE 0.4 MILLIGRAM(S): 2 INJECTION INTRAMUSCULAR; INTRAVENOUS; SUBCUTANEOUS at 23:45

## 2019-10-04 RX ADMIN — HEPARIN SODIUM 5000 UNIT(S): 5000 INJECTION INTRAVENOUS; SUBCUTANEOUS at 07:09

## 2019-10-04 RX ADMIN — PANTOPRAZOLE SODIUM 40 MILLIGRAM(S): 20 TABLET, DELAYED RELEASE ORAL at 07:09

## 2019-10-04 RX ADMIN — Medication 1: at 23:18

## 2019-10-04 RX ADMIN — Medication 50 GRAM(S): at 09:01

## 2019-10-04 RX ADMIN — Medication 700 MILLIGRAM(S): at 22:36

## 2019-10-04 RX ADMIN — HEPARIN SODIUM 5000 UNIT(S): 5000 INJECTION INTRAVENOUS; SUBCUTANEOUS at 13:36

## 2019-10-04 RX ADMIN — HEPARIN SODIUM 5000 UNIT(S): 5000 INJECTION INTRAVENOUS; SUBCUTANEOUS at 22:38

## 2019-10-04 RX ADMIN — AMLODIPINE BESYLATE 5 MILLIGRAM(S): 2.5 TABLET ORAL at 07:09

## 2019-10-04 RX ADMIN — Medication 280 MILLIGRAM(S): at 22:06

## 2019-10-04 NOTE — DIETITIAN INITIAL EVALUATION ADULT. - MALNUTRITION
Considering  severe body fat loss orbital, tricep region and severe muscle mass loss around temples, clavicles and shoulders with <75 % po from >1 month pt meets the criteria for severe malnutrition in the context of chronic illness.

## 2019-10-04 NOTE — CHART NOTE - NSCHARTNOTEFT_GEN_A_CORE
NUTRITION SERVICES     Upon Nutritional Assessment by the Registered Dietitian your patient was determined to meet criteria/ has evidence of the following diagnosis/diagnoses:  [ ] Mild Protein Calorie Malnutrition   [ ] Moderate Protein Calorie Malnutrition   [ x] Severe Protein Calorie Malnutrition   [ ] Unspecified Protein Calorie Malnutrition   [ ] Underweight / BMI <19  [ ] Morbid Obesity / BMI >40    Findings as based on:  •  Comprehensive nutrition assessment and consultation   •    Treatment:  Refer to initial assessment completed under document section.

## 2019-10-04 NOTE — BRIEF OPERATIVE NOTE - NSICDXBRIEFPROCEDURE_GEN_ALL_CORE_FT
PROCEDURES:  Abdominal rectopexy 04-Oct-2019 21:45:51  Panchito Maciel
PROCEDURES:  Exam under anesthesia, anus 27-Sep-2019 04:47:17  July Gomez  Pushing back rectal prolapse 27-Sep-2019 04:47:07  July Gomez

## 2019-10-04 NOTE — DIETITIAN INITIAL EVALUATION ADULT. - OTHER INFO
77 y/o female NPO at this time for procedure for rectal prolapse, resting at the time of interview, no family present. Information obtained from medical charts, PCA, RN. Pt with no nausea, vomiting and diarrhea or significant weight changes at this time, as per charts lose stools were related with bowel prep. 77 y/o reported significant weight loss ( not able to quantify) in last 6 months due to lack of appetite and decreased po intake related with loose dentures. Pt was taking boost 2 cans daily at home and soft/pureed foods, no nausea, vomiting and diarrhea at this time confirmed with RN and PCA. as per charts yesterday loose stools were related with bowel prep. Pt NPO at this time for procedure for prolapse rectum. LABS reviewed, potassium supplementation noted. Recommend speech and swallow assessment when po appropriate.   Considering  severe body fat loss orbital, tricep region and severe muscle mass loss around temples, clavicles and shoulders with <75 % po from >1 month pt meets the criteria for severe malnutrition in the context of chronic illness.

## 2019-10-04 NOTE — PROGRESS NOTE ADULT - SUBJECTIVE AND OBJECTIVE BOX
Morning Surgical Progress Note  Patient is a 78y old  Female who presents with a chief complaint of Rectal prolapse (03 Oct 2019 13:29)      SUBJECTIVE: Patient seen and examined at bedside with surgical team, patient without complaints this am. Had bowel prep overnight now with diarrhea.    PAST MEDICAL & SURGICAL HISTORY:  Diastolic heart failure  Acute Ischemic Right KENY Stroke  History of Leukemia  H/O: Hypertension  Anemia  Lupus  Hypercholesteremia  Diabetes  Depression  Status post cataract extraction and insertion of intraocular lens, unspecified laterality  H/O: Hysterectomy    FAMILY HISTORY:  Family history of cardiomyopathy    REVIEW OF SYSTEMS:    CONSTITUTIONAL: No weakness, fevers or chills  EYES/ENT: No visual changes;  No vertigo or throat pain   NECK: No pain or stiffness  RESPIRATORY: No cough, wheezing, hemoptysis; No shortness of breath  CARDIOVASCULAR: No chest pain or palpitations  GASTROINTESTINAL: No abdominal or epigastric pain. No nausea, vomiting, or hematemesis; No diarrhea or constipation. No melena or hematochezia.  GENITOURINARY: No dysuria, frequency or hematuria  NEUROLOGICAL: No numbness or weakness  SKIN: No itching, rashes    Vital Signs Last 24 Hrs  T(C): 36.3 (04 Oct 2019 07:06), Max: 37.2 (03 Oct 2019 17:51)  T(F): 97.3 (04 Oct 2019 07:06), Max: 99 (03 Oct 2019 17:51)  HR: 78 (04 Oct 2019 07:06) (78 - 98)  BP: 143/72 (04 Oct 2019 07:06) (117/55 - 151/73)  RR: 18 (04 Oct 2019 07:06) (18 - 20)  SpO2: 100% (04 Oct 2019 07:06) (92% - 100%)I&O's Detail  03 Oct 2019 07:01  -  04 Oct 2019 07:00  --------------------------------------------------------  IN:    dextrose 5% + sodium chloride 0.45% with potassium chloride 20 mEq/L: 725 mL    IV PiggyBack: 350 mL    lactated ringers.: 150 mL    Oral Fluid: 1600 mL  Total IN: 2825 mL  OUT:  Total OUT: 0 mL incontinent voids recorded  Total NET: 2825 mL      Medications  MEDICATIONS  (STANDING):  amLODIPine   Tablet 5 milliGRAM(s) Oral daily  cholecalciferol 400 Unit(s) Oral daily  dextrose 5% + sodium chloride 0.45% with potassium chloride 20 mEq/L 1000 milliLiter(s) (75 mL/Hr) IV Continuous <Continuous>  heparin  Injectable 5000 Unit(s) SubCutaneous every 8 hours  influenza   Vaccine 0.5 milliLiter(s) IntraMuscular once  losartan 100 milliGRAM(s) Oral daily  pantoprazole    Tablet 40 milliGRAM(s) Oral before breakfast  MEDICATIONS  (PRN):  acetaminophen   Tablet .. 650 milliGRAM(s) Oral every 6 hours PRN Moderate Pain (4 - 6)  artificial  tears Solution 1 Drop(s) Both EYES three times a day PRN Dry Eyes  morphine  - Injectable 2 milliGRAM(s) IV Push every 4 hours PRN Moderate Pain (4 - 6)    Physical Exam  Constitutional: A&O, NAD  Eyes: Scleras clear, PERRLA/ EOMI, Gross vision intact, + glasses  Gastrointestinal: Soft nontender, nondistended, no rectal prolapse   Extremities: Moving all extremities, no edema  Skin: No Rashes, Hematoma, Ecchymosis    LABS:                        10.6   3.55  )-----------( 163      ( 03 Oct 2019 05:35 )             34.1     10-    135  |  101  |  6<L>  ----------------------------<  101<H>  4.1   |  21<L>  |  0.45<L>    Ca    9.1      03 Oct 2019 19:10  Phos  2.8     10-  Mg     1.9     10-    PT/INR - ( 03 Oct 2019 05:35 )   PT: 12.6 SEC;   INR: 1.13     PTT - ( 03 Oct 2019 05:35 )  PTT:32.4 SEC  Urinalysis Basic - ( 02 Oct 2019 20:30 )  Color: LIGHT YELLOW / Appearance: Lt TURBID / S.009 / pH: 6.5  Gluc: NEGATIVE / Ketone: NEGATIVE  / Bili: NEGATIVE / Urobili: NORMAL   Blood: TRACE / Protein: 10 / Nitrite: NEGATIVE   Leuk Esterase: LARGE / RBC: 6-10 / WBC >50   Sq Epi: OCC / Non Sq Epi: x / Bacteria: NEGATIVE

## 2019-10-04 NOTE — PROGRESS NOTE ADULT - SUBJECTIVE AND OBJECTIVE BOX
INTERVAL HPI/OVERNIGHT EVENTS:    pt reports increased diarrhea after bowel prep   no abd pain   no n/v  npo for OR    MEDICATIONS  (STANDING):  amLODIPine   Tablet 5 milliGRAM(s) Oral daily  cholecalciferol 400 Unit(s) Oral daily  dextrose 5% + sodium chloride 0.45% with potassium chloride 20 mEq/L 1000 milliLiter(s) (75 mL/Hr) IV Continuous <Continuous>  heparin  Injectable 5000 Unit(s) SubCutaneous every 8 hours  influenza   Vaccine 0.5 milliLiter(s) IntraMuscular once  losartan 100 milliGRAM(s) Oral daily  pantoprazole    Tablet 40 milliGRAM(s) Oral before breakfast    MEDICATIONS  (PRN):  acetaminophen   Tablet .. 650 milliGRAM(s) Oral every 6 hours PRN Moderate Pain (4 - 6)  artificial  tears Solution 1 Drop(s) Both EYES three times a day PRN Dry Eyes  morphine  - Injectable 2 milliGRAM(s) IV Push every 4 hours PRN Moderate Pain (4 - 6)      Allergies    SULFA (Unknown)  sulfa drugs (Unknown)    Intolerances        Review of Systems:    General:  No wt loss, fevers, chills, night sweats, fatigue   Eyes:  Good vision, no reported pain  ENT:  No sore throat, pain, runny nose, dysphagia  CV:  No pain, palpitations, hypo/hypertension  Resp:  No dyspnea, cough, tachypnea, wheezing  GI:  No pain, No nausea, No vomiting, No diarrhea, No constipation, No weight loss, No fever, No pruritis, No rectal bleeding, No melena, No dysphagia  :  No pain, bleeding, incontinence, nocturia  Muscle:  No pain, weakness  Neuro:  No weakness, tingling, memory problems  Psych:  No fatigue, insomnia, mood problems, depression  Endocrine:  No polyuria, polydypsia, cold/heat intolerance  Heme:  No petechiae, ecchymosis, easy bruisability  Skin:  No rash, tattoos, scars, edema      Vital Signs Last 24 Hrs  T(C): 36.6 (04 Oct 2019 10:30), Max: 37.2 (03 Oct 2019 17:51)  T(F): 97.8 (04 Oct 2019 10:30), Max: 99 (03 Oct 2019 17:51)  HR: 78 (04 Oct 2019 10:30) (78 - 98)  BP: 135/56 (04 Oct 2019 10:30) (117/55 - 151/73)  BP(mean): --  RR: 18 (04 Oct 2019 10:30) (18 - 18)  SpO2: 100% (04 Oct 2019 10:30) (92% - 100%)    PHYSICAL EXAM:    Constitutional: NAD  HEENT: EOMI, throat clear  Neck: No LAD, supple  Respiratory: CTA and P  Cardiovascular: S1 and S2, RRR, no M  Gastrointestinal: BS+, soft, NT/ND, neg HSM,  Extremities: No peripheral edema, neg clubbing, cyanosis  Vascular: 2+ peripheral pulses  Neurological: A/O x 3, no focal deficits  Psychiatric: Normal mood, normal affect  Skin: No rashes      LABS:                        10.8   4.55  )-----------( 172      ( 04 Oct 2019 06:30 )             34.2     10-04    136  |  110<H>  |  7   ----------------------------<  143<H>  4.1   |  16<L>  |  0.48<L>    Ca    8.9      04 Oct 2019 06:30  Phos  2.5     10-04  Mg     1.7     10-04      PT/INR - ( 03 Oct 2019 05:35 )   PT: 12.6 SEC;   INR: 1.13          PTT - ( 03 Oct 2019 05:35 )  PTT:32.4 SEC  Urinalysis Basic - ( 02 Oct 2019 20:30 )    Color: LIGHT YELLOW / Appearance: Lt TURBID / S.009 / pH: 6.5  Gluc: NEGATIVE / Ketone: NEGATIVE  / Bili: NEGATIVE / Urobili: NORMAL   Blood: TRACE / Protein: 10 / Nitrite: NEGATIVE   Leuk Esterase: LARGE / RBC: 6-10 / WBC >50   Sq Epi: OCC / Non Sq Epi: x / Bacteria: NEGATIVE        RADIOLOGY & ADDITIONAL TESTS:

## 2019-10-04 NOTE — PROGRESS NOTE ADULT - ASSESSMENT
78F w/ PMH of Lupus (on long-term prednisone), HTN, Anemia, HLD, Osteoporosis, DM, Gout, Depression, CVA, now s/p successful reduction of rectal prolapse under anesthesia going to OR today for repair    Plan:  - NPO for OR today with MIVF  - medically cleared  - Continue amlodipine and losartan for hypertension  - CT and echo per medicine, negative  - monitor for recurrence of prolapse  - DVT PPX    A Team Surgery   [id number]

## 2019-10-04 NOTE — BRIEF OPERATIVE NOTE - NSICDXBRIEFPOSTOP_GEN_ALL_CORE_FT
POST-OP DIAGNOSIS:  Rectal prolapse 27-Sep-2019 04:47:47  July Gomez
POST-OP DIAGNOSIS:  Rectal prolapse 27-Sep-2019 04:47:47  July Gomez

## 2019-10-04 NOTE — BRIEF OPERATIVE NOTE - NSICDXBRIEFPREOP_GEN_ALL_CORE_FT
PRE-OP DIAGNOSIS:  Rectal prolapse 27-Sep-2019 04:47:39  July Gomez
PRE-OP DIAGNOSIS:  Rectal prolapse 27-Sep-2019 04:47:39  July Gomez

## 2019-10-05 LAB
ANION GAP SERPL CALC-SCNC: 10 MMO/L — SIGNIFICANT CHANGE UP (ref 7–14)
BUN SERPL-MCNC: 6 MG/DL — LOW (ref 7–23)
CALCIUM SERPL-MCNC: 8.9 MG/DL — SIGNIFICANT CHANGE UP (ref 8.4–10.5)
CHLORIDE SERPL-SCNC: 106 MMOL/L — SIGNIFICANT CHANGE UP (ref 98–107)
CO2 SERPL-SCNC: 15 MMOL/L — LOW (ref 22–31)
CREAT SERPL-MCNC: 0.45 MG/DL — LOW (ref 0.5–1.3)
GLUCOSE BLDC GLUCOMTR-MCNC: 117 MG/DL — HIGH (ref 70–99)
GLUCOSE BLDC GLUCOMTR-MCNC: 120 MG/DL — HIGH (ref 70–99)
GLUCOSE BLDC GLUCOMTR-MCNC: 156 MG/DL — HIGH (ref 70–99)
GLUCOSE BLDC GLUCOMTR-MCNC: 202 MG/DL — HIGH (ref 70–99)
GLUCOSE SERPL-MCNC: 207 MG/DL — HIGH (ref 70–99)
HCT VFR BLD CALC: 33.7 % — LOW (ref 34.5–45)
HGB BLD-MCNC: 10.7 G/DL — LOW (ref 11.5–15.5)
MAGNESIUM SERPL-MCNC: 1.6 MG/DL — SIGNIFICANT CHANGE UP (ref 1.6–2.6)
MCHC RBC-ENTMCNC: 27.2 PG — SIGNIFICANT CHANGE UP (ref 27–34)
MCHC RBC-ENTMCNC: 31.8 % — LOW (ref 32–36)
MCV RBC AUTO: 85.5 FL — SIGNIFICANT CHANGE UP (ref 80–100)
NRBC # FLD: 0 K/UL — SIGNIFICANT CHANGE UP (ref 0–0)
PHOSPHATE SERPL-MCNC: 3.3 MG/DL — SIGNIFICANT CHANGE UP (ref 2.5–4.5)
PLATELET # BLD AUTO: 141 K/UL — LOW (ref 150–400)
PMV BLD: 10.7 FL — SIGNIFICANT CHANGE UP (ref 7–13)
POTASSIUM SERPL-MCNC: 4.9 MMOL/L — SIGNIFICANT CHANGE UP (ref 3.5–5.3)
POTASSIUM SERPL-SCNC: 4.9 MMOL/L — SIGNIFICANT CHANGE UP (ref 3.5–5.3)
RBC # BLD: 3.94 M/UL — SIGNIFICANT CHANGE UP (ref 3.8–5.2)
RBC # FLD: 13.6 % — SIGNIFICANT CHANGE UP (ref 10.3–14.5)
SODIUM SERPL-SCNC: 131 MMOL/L — LOW (ref 135–145)
WBC # BLD: 6.81 K/UL — SIGNIFICANT CHANGE UP (ref 3.8–10.5)
WBC # FLD AUTO: 6.81 K/UL — SIGNIFICANT CHANGE UP (ref 3.8–10.5)

## 2019-10-05 RX ORDER — MAGNESIUM SULFATE 500 MG/ML
2 VIAL (ML) INJECTION ONCE
Refills: 0 | Status: COMPLETED | OUTPATIENT
Start: 2019-10-05 | End: 2019-10-05

## 2019-10-05 RX ORDER — PSYLLIUM SEED (WITH DEXTROSE)
1 POWDER (GRAM) ORAL DAILY
Refills: 0 | Status: DISCONTINUED | OUTPATIENT
Start: 2019-10-05 | End: 2019-10-11

## 2019-10-05 RX ORDER — POLYETHYLENE GLYCOL 3350 17 G/17G
17 POWDER, FOR SOLUTION ORAL DAILY
Refills: 0 | Status: DISCONTINUED | OUTPATIENT
Start: 2019-10-05 | End: 2019-10-11

## 2019-10-05 RX ORDER — MAGNESIUM SULFATE 500 MG/ML
4 VIAL (ML) INJECTION ONCE
Refills: 0 | Status: DISCONTINUED | OUTPATIENT
Start: 2019-10-05 | End: 2019-10-05

## 2019-10-05 RX ADMIN — HEPARIN SODIUM 5000 UNIT(S): 5000 INJECTION INTRAVENOUS; SUBCUTANEOUS at 05:04

## 2019-10-05 RX ADMIN — POLYETHYLENE GLYCOL 3350 17 GRAM(S): 17 POWDER, FOR SOLUTION ORAL at 13:58

## 2019-10-05 RX ADMIN — Medication 1 PACKET(S): at 18:51

## 2019-10-05 RX ADMIN — Medication 400 UNIT(S): at 12:41

## 2019-10-05 RX ADMIN — LOSARTAN POTASSIUM 100 MILLIGRAM(S): 100 TABLET, FILM COATED ORAL at 05:04

## 2019-10-05 RX ADMIN — Medication 50 GRAM(S): at 12:41

## 2019-10-05 RX ADMIN — Medication 700 MILLIGRAM(S): at 13:11

## 2019-10-05 RX ADMIN — PANTOPRAZOLE SODIUM 40 MILLIGRAM(S): 20 TABLET, DELAYED RELEASE ORAL at 05:04

## 2019-10-05 RX ADMIN — Medication 700 MILLIGRAM(S): at 19:20

## 2019-10-05 RX ADMIN — Medication 280 MILLIGRAM(S): at 18:50

## 2019-10-05 RX ADMIN — HEPARIN SODIUM 5000 UNIT(S): 5000 INJECTION INTRAVENOUS; SUBCUTANEOUS at 23:16

## 2019-10-05 RX ADMIN — HEPARIN SODIUM 5000 UNIT(S): 5000 INJECTION INTRAVENOUS; SUBCUTANEOUS at 14:02

## 2019-10-05 RX ADMIN — Medication 2: at 09:29

## 2019-10-05 RX ADMIN — Medication 50 GRAM(S): at 09:29

## 2019-10-05 RX ADMIN — Medication 280 MILLIGRAM(S): at 05:03

## 2019-10-05 RX ADMIN — Medication 280 MILLIGRAM(S): at 12:41

## 2019-10-05 RX ADMIN — Medication 280 MILLIGRAM(S): at 23:16

## 2019-10-05 RX ADMIN — AMLODIPINE BESYLATE 5 MILLIGRAM(S): 2.5 TABLET ORAL at 05:04

## 2019-10-05 RX ADMIN — Medication 700 MILLIGRAM(S): at 23:58

## 2019-10-05 NOTE — CHART NOTE - NSCHARTNOTEFT_GEN_A_CORE
GENERAL SURGERY PROGRESS NOTE:    Interval:  No acute events since op.    Subjective:  Patient seen and examined sp open rectopexy. Did not have hearing aids. Stated no complaints.     Vital Signs Last 24 Hrs  T(C): 36.9 (05 Oct 2019 00:47), Max: 37.6 (04 Oct 2019 23:30)  T(F): 98.5 (05 Oct 2019 00:47), Max: 99.7 (04 Oct 2019 23:30)  HR: 92 (05 Oct 2019 00:47) (68 - 104)  BP: 101/58 (05 Oct 2019 00:47) (101/58 - 165/61)  BP(mean): 68 (04 Oct 2019 23:45) (58 - 89)  RR: 16 (05 Oct 2019 00:47) (8 - 19)  SpO2: 98% (05 Oct 2019 00:47) (82% - 100%)    Exam:  Gen: NAD, resting in bed, alert and responding appropriately  Resp: Airway patent, non-labored respirations  Abd: Soft, ND, NTTP x 4 quadrants, no rebound or guarding.   : Luong  Rectum: Benign  Ext: No edema, WWP  Neuro: AAOx3, no focal deficits    I&O's Detail    03 Oct 2019 07:01  -  04 Oct 2019 07:00  --------------------------------------------------------  IN:    dextrose 5% + sodium chloride 0.45% with potassium chloride 20 mEq/L: 1100 mL    IV PiggyBack: 350 mL    lactated ringers.: 150 mL    Oral Fluid: 1600 mL  Total IN: 3200 mL    OUT:  Total OUT: 0 mL    Total NET: 3200 mL      04 Oct 2019 07:01  -  05 Oct 2019 03:30  --------------------------------------------------------  IN:    dextrose 5% + sodium chloride 0.45% with potassium chloride 20 mEq/L: 600 mL    dextrose 5% + sodium chloride 0.45% with potassium chloride 20 mEq/L: 150 mL    IV PiggyBack: 50 mL  Total IN: 800 mL    OUT:    Indwelling Catheter - Urethral: 170 mL  Total OUT: 170 mL    Total NET: 630 mL          Daily Height in cm: 162.56 (04 Oct 2019 16:40)    Daily Weight in k (04 Oct 2019 14:24)    MEDICATIONS  (STANDING):  acetaminophen  IVPB .. 700 milliGRAM(s) IV Intermittent once  acetaminophen  IVPB .. 700 milliGRAM(s) IV Intermittent once  acetaminophen  IVPB .. 700 milliGRAM(s) IV Intermittent once  acetaminophen  IVPB .. 700 milliGRAM(s) IV Intermittent once  amLODIPine   Tablet 5 milliGRAM(s) Oral daily  cholecalciferol 400 Unit(s) Oral daily  dextrose 5% + sodium chloride 0.45% with potassium chloride 20 mEq/L 1000 milliLiter(s) (75 mL/Hr) IV Continuous <Continuous>  dextrose 5%. 1000 milliLiter(s) (50 mL/Hr) IV Continuous <Continuous>  dextrose 50% Injectable 12.5 Gram(s) IV Push once  dextrose 50% Injectable 25 Gram(s) IV Push once  dextrose 50% Injectable 25 Gram(s) IV Push once  heparin  Injectable 5000 Unit(s) SubCutaneous every 8 hours  influenza   Vaccine 0.5 milliLiter(s) IntraMuscular once  insulin lispro (HumaLOG) corrective regimen sliding scale   SubCutaneous three times a day before meals  losartan 100 milliGRAM(s) Oral daily  pantoprazole    Tablet 40 milliGRAM(s) Oral before breakfast    MEDICATIONS  (PRN):  artificial  tears Solution 1 Drop(s) Both EYES three times a day PRN Dry Eyes  dextrose 40% Gel 15 Gram(s) Oral once PRN Blood Glucose LESS THAN 70 milliGRAM(s)/deciliter  glucagon  Injectable 1 milliGRAM(s) IntraMuscular once PRN Glucose LESS THAN 70 milligrams/deciliter  HYDROmorphone  Injectable 0.4 milliGRAM(s) IV Push every 10 minutes PRN Moderate Pain (4 - 6)  morphine  - Injectable 2 milliGRAM(s) IV Push every 4 hours PRN Moderate Pain (4 - 6)      LABS:                        10.8   4.55  )-----------( 172      ( 04 Oct 2019 06:30 )             34.2     10-04    136  |  110<H>  |  7   ----------------------------<  143<H>  4.1   |  16<L>  |  0.48<L>    Ca    8.9      04 Oct 2019 06:30  Phos  2.5     10-04  Mg     1.7     10-04      PT/INR - ( 03 Oct 2019 05:35 )   PT: 12.6 SEC;   INR: 1.13          PTT - ( 03 Oct 2019 05:35 )  PTT:32.4 SEC    78F sp open rectopexy    Plan:   - FSs  - IVFs  - CLD  - PT    JAGRUTI Foster, PGY-1  A Team Surgery  [id number] with any questions

## 2019-10-05 NOTE — PROGRESS NOTE ADULT - SUBJECTIVE AND OBJECTIVE BOX
INTERVAL HPI/OVERNIGHT EVENTS:    feeling well post-op   pain controlled  no n/v  tolerating diet     MEDICATIONS  (STANDING):  acetaminophen  IVPB .. 700 milliGRAM(s) IV Intermittent once  acetaminophen  IVPB .. 700 milliGRAM(s) IV Intermittent once  acetaminophen  IVPB .. 700 milliGRAM(s) IV Intermittent once  amLODIPine   Tablet 5 milliGRAM(s) Oral daily  cholecalciferol 400 Unit(s) Oral daily  dextrose 5% + sodium chloride 0.45% with potassium chloride 20 mEq/L 1000 milliLiter(s) (75 mL/Hr) IV Continuous <Continuous>  dextrose 5%. 1000 milliLiter(s) (50 mL/Hr) IV Continuous <Continuous>  dextrose 50% Injectable 12.5 Gram(s) IV Push once  dextrose 50% Injectable 25 Gram(s) IV Push once  dextrose 50% Injectable 25 Gram(s) IV Push once  heparin  Injectable 5000 Unit(s) SubCutaneous every 8 hours  influenza   Vaccine 0.5 milliLiter(s) IntraMuscular once  insulin lispro (HumaLOG) corrective regimen sliding scale   SubCutaneous three times a day before meals  losartan 100 milliGRAM(s) Oral daily  magnesium sulfate  IVPB 2 Gram(s) IV Intermittent once  pantoprazole    Tablet 40 milliGRAM(s) Oral before breakfast  polyethylene glycol 3350 17 Gram(s) Oral daily  psyllium Powder 1 Packet(s) Oral daily    MEDICATIONS  (PRN):  artificial  tears Solution 1 Drop(s) Both EYES three times a day PRN Dry Eyes  dextrose 40% Gel 15 Gram(s) Oral once PRN Blood Glucose LESS THAN 70 milliGRAM(s)/deciliter  glucagon  Injectable 1 milliGRAM(s) IntraMuscular once PRN Glucose LESS THAN 70 milligrams/deciliter  morphine  - Injectable 2 milliGRAM(s) IV Push every 4 hours PRN Moderate Pain (4 - 6)      Allergies    SULFA (Unknown)  sulfa drugs (Unknown)    Intolerances        Review of Systems:    General:  No wt loss, fevers, chills, night sweats, fatigue   Eyes:  Good vision, no reported pain  ENT:  No sore throat, pain, runny nose, dysphagia  CV:  No pain, palpitations, hypo/hypertension  Resp:  No dyspnea, cough, tachypnea, wheezing  GI:  No pain, No nausea, No vomiting, No diarrhea, No constipation, No weight loss, No fever, No pruritis, No rectal bleeding, No melena, No dysphagia  :  No pain, bleeding, incontinence, nocturia  Muscle:  No pain, weakness  Neuro:  No weakness, tingling, memory problems  Psych:  No fatigue, insomnia, mood problems, depression  Endocrine:  No polyuria, polydypsia, cold/heat intolerance  Heme:  No petechiae, ecchymosis, easy bruisability  Skin:  No rash, tattoos, scars, edema      Vital Signs Last 24 Hrs  T(C): 36.9 (05 Oct 2019 10:00), Max: 37.6 (04 Oct 2019 23:30)  T(F): 98.4 (05 Oct 2019 10:00), Max: 99.7 (04 Oct 2019 23:30)  HR: 94 (05 Oct 2019 10:00) (68 - 104)  BP: 153/73 (05 Oct 2019 10:00) (101/58 - 165/61)  BP(mean): 68 (04 Oct 2019 23:45) (58 - 89)  RR: 17 (05 Oct 2019 10:00) (8 - 19)  SpO2: 95% (05 Oct 2019 10:00) (82% - 100%)    PHYSICAL EXAM:    Constitutional: NAD  HEENT: EOMI, throat clear  Neck: No LAD, supple  Respiratory: CTA and P  Cardiovascular: S1 and S2, RRR, no M  Gastrointestinal: BS+, soft, NT/ND, neg HSM,  Extremities: No peripheral edema, neg clubbing, cyanosis  Vascular: 2+ peripheral pulses  Neurological: A/O x 3, no focal deficits  Psychiatric: Normal mood, normal affect  Skin: No rashes      LABS:                        10.7   6.81  )-----------( 141      ( 05 Oct 2019 04:25 )             33.7     10-05    131<L>  |  106  |  6<L>  ----------------------------<  207<H>  4.9   |  15<L>  |  0.45<L>    Ca    8.9      05 Oct 2019 04:25  Phos  3.3     10-05  Mg     1.6     10-05            RADIOLOGY & ADDITIONAL TESTS:

## 2019-10-05 NOTE — PROGRESS NOTE ADULT - ASSESSMENT
78F POD0m sp open rectopexy on 10/4/19    Plan:   - IVFs  - CLD  - PT re-eval s/p rectopexy  - f/u GI fx

## 2019-10-05 NOTE — PROGRESS NOTE ADULT - SUBJECTIVE AND OBJECTIVE BOX
no events, pain noted    abd soft inc intact        Objective:    MEDICATIONS  (STANDING):  acetaminophen  IVPB .. 700 milliGRAM(s) IV Intermittent once  acetaminophen  IVPB .. 700 milliGRAM(s) IV Intermittent once  acetaminophen  IVPB .. 700 milliGRAM(s) IV Intermittent once  amLODIPine   Tablet 5 milliGRAM(s) Oral daily  cholecalciferol 400 Unit(s) Oral daily  dextrose 5% + sodium chloride 0.45% with potassium chloride 20 mEq/L 1000 milliLiter(s) (75 mL/Hr) IV Continuous <Continuous>  dextrose 5%. 1000 milliLiter(s) (50 mL/Hr) IV Continuous <Continuous>  dextrose 50% Injectable 12.5 Gram(s) IV Push once  dextrose 50% Injectable 25 Gram(s) IV Push once  dextrose 50% Injectable 25 Gram(s) IV Push once  heparin  Injectable 5000 Unit(s) SubCutaneous every 8 hours  influenza   Vaccine 0.5 milliLiter(s) IntraMuscular once  insulin lispro (HumaLOG) corrective regimen sliding scale   SubCutaneous three times a day before meals  losartan 100 milliGRAM(s) Oral daily  pantoprazole    Tablet 40 milliGRAM(s) Oral before breakfast    MEDICATIONS  (PRN):  artificial  tears Solution 1 Drop(s) Both EYES three times a day PRN Dry Eyes  dextrose 40% Gel 15 Gram(s) Oral once PRN Blood Glucose LESS THAN 70 milliGRAM(s)/deciliter  glucagon  Injectable 1 milliGRAM(s) IntraMuscular once PRN Glucose LESS THAN 70 milligrams/deciliter  morphine  - Injectable 2 milliGRAM(s) IV Push every 4 hours PRN Moderate Pain (4 - 6)      Vital Signs Last 24 Hrs  T(C): 36.3 (05 Oct 2019 04:51), Max: 37.6 (04 Oct 2019 23:30)  T(F): 97.3 (05 Oct 2019 04:51), Max: 99.7 (04 Oct 2019 23:30)  HR: 82 (05 Oct 2019 04:51) (68 - 104)  BP: 131/61 (05 Oct 2019 04:51) (101/58 - 165/61)  BP(mean): 68 (04 Oct 2019 23:45) (58 - 89)  RR: 17 (05 Oct 2019 04:51) (8 - 19)  SpO2: 99% (05 Oct 2019 04:51) (82% - 100%)    I&O's Detail    04 Oct 2019 07:01  -  05 Oct 2019 07:00  --------------------------------------------------------  IN:    dextrose 5% + sodium chloride 0.45% with potassium chloride 20 mEq/L: 600 mL    dextrose 5% + sodium chloride 0.45% with potassium chloride 20 mEq/L: 150 mL    IV PiggyBack: 50 mL  Total IN: 800 mL    OUT:    Indwelling Catheter - Urethral: 320 mL  Total OUT: 320 mL    Total NET: 480 mL          Daily Height in cm: 162.56 (04 Oct 2019 16:40)    Daily Weight in k (04 Oct 2019 14:24)    LABS:                        10.7   6.81  )-----------( 141      ( 05 Oct 2019 04:25 )             33.7     10-05    131<L>  |  106  |  6<L>  ----------------------------<  207<H>  4.9   |  15<L>  |  0.45<L>    Ca    8.9      05 Oct 2019 04:25  Phos  3.3     10-05  Mg     1.6     10-05            RADIOLOGY & ADDITIONAL STUDIES: Pt. seen and examined at the bedside this AM. Reports pain well controlled. +flatus, -BM o/n. Pt. did not say whetehr she has been OOB yesterday w/ PT.    Objective:    MEDICATIONS  (STANDING):  acetaminophen  IVPB .. 700 milliGRAM(s) IV Intermittent once  acetaminophen  IVPB .. 700 milliGRAM(s) IV Intermittent once  acetaminophen  IVPB .. 700 milliGRAM(s) IV Intermittent once  amLODIPine   Tablet 5 milliGRAM(s) Oral daily  cholecalciferol 400 Unit(s) Oral daily  dextrose 5% + sodium chloride 0.45% with potassium chloride 20 mEq/L 1000 milliLiter(s) (75 mL/Hr) IV Continuous <Continuous>  dextrose 5%. 1000 milliLiter(s) (50 mL/Hr) IV Continuous <Continuous>  dextrose 50% Injectable 12.5 Gram(s) IV Push once  dextrose 50% Injectable 25 Gram(s) IV Push once  dextrose 50% Injectable 25 Gram(s) IV Push once  heparin  Injectable 5000 Unit(s) SubCutaneous every 8 hours  influenza   Vaccine 0.5 milliLiter(s) IntraMuscular once  insulin lispro (HumaLOG) corrective regimen sliding scale   SubCutaneous three times a day before meals  losartan 100 milliGRAM(s) Oral daily  pantoprazole    Tablet 40 milliGRAM(s) Oral before breakfast    MEDICATIONS  (PRN):  artificial  tears Solution 1 Drop(s) Both EYES three times a day PRN Dry Eyes  dextrose 40% Gel 15 Gram(s) Oral once PRN Blood Glucose LESS THAN 70 milliGRAM(s)/deciliter  glucagon  Injectable 1 milliGRAM(s) IntraMuscular once PRN Glucose LESS THAN 70 milligrams/deciliter  morphine  - Injectable 2 milliGRAM(s) IV Push every 4 hours PRN Moderate Pain (4 - 6)      Vital Signs Last 24 Hrs  T(C): 36.3 (05 Oct 2019 04:51), Max: 37.6 (04 Oct 2019 23:30)  T(F): 97.3 (05 Oct 2019 04:51), Max: 99.7 (04 Oct 2019 23:30)  HR: 82 (05 Oct 2019 04:51) (68 - 104)  BP: 131/61 (05 Oct 2019 04:51) (101/58 - 165/61)  BP(mean): 68 (04 Oct 2019 23:45) (58 - 89)  RR: 17 (05 Oct 2019 04:51) (8 - 19)  SpO2: 99% (05 Oct 2019 04:51) (82% - 100%)    I&O's Detail    04 Oct 2019 07:01  -  05 Oct 2019 07:00  --------------------------------------------------------  IN:    dextrose 5% + sodium chloride 0.45% with potassium chloride 20 mEq/L: 600 mL    dextrose 5% + sodium chloride 0.45% with potassium chloride 20 mEq/L: 150 mL    IV PiggyBack: 50 mL  Total IN: 800 mL    OUT:    Indwelling Catheter - Urethral: 320 mL  Total OUT: 320 mL    Total NET: 480 mL          Daily Height in cm: 162.56 (04 Oct 2019 16:40)    Daily Weight in k (04 Oct 2019 14:24)    LABS:                        10.7   6.81  )-----------( 141      ( 05 Oct 2019 04:25 )             33.7     10-05    131<L>  |  106  |  6<L>  ----------------------------<  207<H>  4.9   |  15<L>  |  0.45<L>    Ca    8.9      05 Oct 2019 04:25  Phos  3.3     10-05  Mg     1.6     10-05      Exam:  Gen: NAD, resting in bed, alert and responding appropriately, somewhat startled and lost for words as we awoke the pt. this morning  Resp: Airway patent, non-labored respirations  Abd: Soft, ND, NTTP x 4 quadrants, no rebound or guarding.   : Luong  Rectum: Benign  Ext: No edema, WWP  Neuro: AAOx3, no focal deficits

## 2019-10-06 LAB
ANION GAP SERPL CALC-SCNC: 9 MMO/L — SIGNIFICANT CHANGE UP (ref 7–14)
BUN SERPL-MCNC: 5 MG/DL — LOW (ref 7–23)
CALCIUM SERPL-MCNC: 9.2 MG/DL — SIGNIFICANT CHANGE UP (ref 8.4–10.5)
CHLORIDE SERPL-SCNC: 101 MMOL/L — SIGNIFICANT CHANGE UP (ref 98–107)
CO2 SERPL-SCNC: 20 MMOL/L — LOW (ref 22–31)
CREAT SERPL-MCNC: 0.47 MG/DL — LOW (ref 0.5–1.3)
GLUCOSE BLDC GLUCOMTR-MCNC: 122 MG/DL — HIGH (ref 70–99)
GLUCOSE BLDC GLUCOMTR-MCNC: 169 MG/DL — HIGH (ref 70–99)
GLUCOSE BLDC GLUCOMTR-MCNC: 183 MG/DL — HIGH (ref 70–99)
GLUCOSE BLDC GLUCOMTR-MCNC: 96 MG/DL — SIGNIFICANT CHANGE UP (ref 70–99)
GLUCOSE SERPL-MCNC: 106 MG/DL — HIGH (ref 70–99)
HCT VFR BLD CALC: 35.7 % — SIGNIFICANT CHANGE UP (ref 34.5–45)
HGB BLD-MCNC: 11 G/DL — LOW (ref 11.5–15.5)
MAGNESIUM SERPL-MCNC: 1.9 MG/DL — SIGNIFICANT CHANGE UP (ref 1.6–2.6)
MCHC RBC-ENTMCNC: 26.1 PG — LOW (ref 27–34)
MCHC RBC-ENTMCNC: 30.8 % — LOW (ref 32–36)
MCV RBC AUTO: 84.8 FL — SIGNIFICANT CHANGE UP (ref 80–100)
NRBC # FLD: 0 K/UL — SIGNIFICANT CHANGE UP (ref 0–0)
PHOSPHATE SERPL-MCNC: 2.5 MG/DL — SIGNIFICANT CHANGE UP (ref 2.5–4.5)
PLATELET # BLD AUTO: 203 K/UL — SIGNIFICANT CHANGE UP (ref 150–400)
PMV BLD: 11.6 FL — SIGNIFICANT CHANGE UP (ref 7–13)
POTASSIUM SERPL-MCNC: 3.7 MMOL/L — SIGNIFICANT CHANGE UP (ref 3.5–5.3)
POTASSIUM SERPL-SCNC: 3.7 MMOL/L — SIGNIFICANT CHANGE UP (ref 3.5–5.3)
RBC # BLD: 4.21 M/UL — SIGNIFICANT CHANGE UP (ref 3.8–5.2)
RBC # FLD: 13.7 % — SIGNIFICANT CHANGE UP (ref 10.3–14.5)
SODIUM SERPL-SCNC: 130 MMOL/L — LOW (ref 135–145)
WBC # BLD: 6.62 K/UL — SIGNIFICANT CHANGE UP (ref 3.8–10.5)
WBC # FLD AUTO: 6.62 K/UL — SIGNIFICANT CHANGE UP (ref 3.8–10.5)

## 2019-10-06 RX ORDER — MAGNESIUM SULFATE 500 MG/ML
2 VIAL (ML) INJECTION ONCE
Refills: 0 | Status: COMPLETED | OUTPATIENT
Start: 2019-10-06 | End: 2019-10-06

## 2019-10-06 RX ORDER — SODIUM,POTASSIUM PHOSPHATES 278-250MG
1 POWDER IN PACKET (EA) ORAL ONCE
Refills: 0 | Status: COMPLETED | OUTPATIENT
Start: 2019-10-06 | End: 2019-10-06

## 2019-10-06 RX ORDER — POTASSIUM CHLORIDE 20 MEQ
10 PACKET (EA) ORAL
Refills: 0 | Status: COMPLETED | OUTPATIENT
Start: 2019-10-06 | End: 2019-10-06

## 2019-10-06 RX ADMIN — Medication 10 MILLIEQUIVALENT(S): at 13:26

## 2019-10-06 RX ADMIN — POLYETHYLENE GLYCOL 3350 17 GRAM(S): 17 POWDER, FOR SOLUTION ORAL at 13:26

## 2019-10-06 RX ADMIN — Medication 10 MILLIEQUIVALENT(S): at 16:50

## 2019-10-06 RX ADMIN — LOSARTAN POTASSIUM 100 MILLIGRAM(S): 100 TABLET, FILM COATED ORAL at 05:19

## 2019-10-06 RX ADMIN — HEPARIN SODIUM 5000 UNIT(S): 5000 INJECTION INTRAVENOUS; SUBCUTANEOUS at 13:27

## 2019-10-06 RX ADMIN — Medication 1 PACKET(S): at 13:27

## 2019-10-06 RX ADMIN — AMLODIPINE BESYLATE 5 MILLIGRAM(S): 2.5 TABLET ORAL at 05:19

## 2019-10-06 RX ADMIN — Medication 1: at 17:25

## 2019-10-06 RX ADMIN — PANTOPRAZOLE SODIUM 40 MILLIGRAM(S): 20 TABLET, DELAYED RELEASE ORAL at 05:19

## 2019-10-06 RX ADMIN — Medication 10 MILLIEQUIVALENT(S): at 12:16

## 2019-10-06 RX ADMIN — Medication 1 PACKET(S): at 12:16

## 2019-10-06 RX ADMIN — Medication 50 GRAM(S): at 12:17

## 2019-10-06 RX ADMIN — Medication 400 UNIT(S): at 13:26

## 2019-10-06 RX ADMIN — HEPARIN SODIUM 5000 UNIT(S): 5000 INJECTION INTRAVENOUS; SUBCUTANEOUS at 05:19

## 2019-10-06 RX ADMIN — HEPARIN SODIUM 5000 UNIT(S): 5000 INJECTION INTRAVENOUS; SUBCUTANEOUS at 21:51

## 2019-10-06 NOTE — PROGRESS NOTE ADULT - ASSESSMENT
78F s/p open rectopexy on 10/4/19. Pt. healing well and is nearing d/c from the hospital      Plan:   - IVFs  - CLD  - PT re-eval s/p rectopexy  - f/u GI fx 78F s/p open rectopexy on 10/4/19. Pt. healing well and is nearing d/c from the hospital      Plan:   - IVFs  - Reg diet  - PT: rehab  - f/u GI fx

## 2019-10-06 NOTE — PROGRESS NOTE ADULT - SUBJECTIVE AND OBJECTIVE BOX
INTERVAL HPI/OVERNIGHT EVENTS:    doing well   reports passed flatus and had a bowel movement   denying abd pain     MEDICATIONS  (STANDING):  amLODIPine   Tablet 5 milliGRAM(s) Oral daily  cholecalciferol 400 Unit(s) Oral daily  dextrose 5% + sodium chloride 0.45% with potassium chloride 20 mEq/L 1000 milliLiter(s) (50 mL/Hr) IV Continuous <Continuous>  dextrose 5%. 1000 milliLiter(s) (50 mL/Hr) IV Continuous <Continuous>  dextrose 50% Injectable 12.5 Gram(s) IV Push once  dextrose 50% Injectable 25 Gram(s) IV Push once  dextrose 50% Injectable 25 Gram(s) IV Push once  heparin  Injectable 5000 Unit(s) SubCutaneous every 8 hours  influenza   Vaccine 0.5 milliLiter(s) IntraMuscular once  insulin lispro (HumaLOG) corrective regimen sliding scale   SubCutaneous three times a day before meals  losartan 100 milliGRAM(s) Oral daily  pantoprazole    Tablet 40 milliGRAM(s) Oral before breakfast  polyethylene glycol 3350 17 Gram(s) Oral daily  psyllium Powder 1 Packet(s) Oral daily    MEDICATIONS  (PRN):  artificial  tears Solution 1 Drop(s) Both EYES three times a day PRN Dry Eyes  dextrose 40% Gel 15 Gram(s) Oral once PRN Blood Glucose LESS THAN 70 milliGRAM(s)/deciliter  glucagon  Injectable 1 milliGRAM(s) IntraMuscular once PRN Glucose LESS THAN 70 milligrams/deciliter  morphine  - Injectable 2 milliGRAM(s) IV Push every 4 hours PRN Moderate Pain (4 - 6)      Allergies    SULFA (Unknown)  sulfa drugs (Unknown)    Intolerances        Review of Systems:    General:  No wt loss, fevers, chills, night sweats, fatigue   Eyes:  Good vision, no reported pain  ENT:  No sore throat, pain, runny nose, dysphagia  CV:  No pain, palpitations, hypo/hypertension  Resp:  No dyspnea, cough, tachypnea, wheezing  GI:  No pain, No nausea, No vomiting, No diarrhea, No constipation, No weight loss, No fever, No pruritis, No rectal bleeding, No melena, No dysphagia  :  No pain, bleeding, incontinence, nocturia  Muscle:  No pain, weakness  Neuro:  No weakness, tingling, memory problems  Psych:  No fatigue, insomnia, mood problems, depression  Endocrine:  No polyuria, polydypsia, cold/heat intolerance  Heme:  No petechiae, ecchymosis, easy bruisability  Skin:  No rash, tattoos, scars, edema      Vital Signs Last 24 Hrs  T(C): 37.1 (06 Oct 2019 05:17), Max: 37.1 (06 Oct 2019 05:17)  T(F): 98.7 (06 Oct 2019 05:17), Max: 98.7 (06 Oct 2019 05:17)  HR: 86 (06 Oct 2019 05:17) (86 - 104)  BP: 163/70 (06 Oct 2019 05:17) (133/60 - 168/77)  BP(mean): --  RR: 17 (06 Oct 2019 05:17) (16 - 18)  SpO2: 100% (06 Oct 2019 05:17) (97% - 100%)    PHYSICAL EXAM:    Constitutional: NAD  HEENT: EOMI, throat clear  Neck: No LAD, supple  Respiratory: CTA and P  Cardiovascular: S1 and S2, RRR, no M  Gastrointestinal: BS+, soft, NT/ND, neg HSM,  Extremities: No peripheral edema, neg clubbing, cyanosis  Vascular: 2+ peripheral pulses  Neurological: A/O x 3, no focal deficits  Psychiatric: Normal mood, normal affect  Skin: No rashes      LABS:                        11.0   6.62  )-----------( 203      ( 06 Oct 2019 06:46 )             35.7     10-06    130<L>  |  101  |  5<L>  ----------------------------<  106<H>  3.7   |  20<L>  |  0.47<L>    Ca    9.2      06 Oct 2019 06:46  Phos  2.5     10-06  Mg     1.9     10-06            RADIOLOGY & ADDITIONAL TESTS:

## 2019-10-07 DIAGNOSIS — K91.89 OTHER POSTPROCEDURAL COMPLICATIONS AND DISORDERS OF DIGESTIVE SYSTEM: ICD-10-CM

## 2019-10-07 LAB
ANION GAP SERPL CALC-SCNC: 14 MMO/L — SIGNIFICANT CHANGE UP (ref 7–14)
BUN SERPL-MCNC: 15 MG/DL — SIGNIFICANT CHANGE UP (ref 7–23)
CALCIUM SERPL-MCNC: 9.6 MG/DL — SIGNIFICANT CHANGE UP (ref 8.4–10.5)
CHLORIDE SERPL-SCNC: 94 MMOL/L — LOW (ref 98–107)
CO2 SERPL-SCNC: 19 MMOL/L — LOW (ref 22–31)
CREAT SERPL-MCNC: 0.56 MG/DL — SIGNIFICANT CHANGE UP (ref 0.5–1.3)
GLUCOSE BLDC GLUCOMTR-MCNC: 118 MG/DL — HIGH (ref 70–99)
GLUCOSE BLDC GLUCOMTR-MCNC: 166 MG/DL — HIGH (ref 70–99)
GLUCOSE BLDC GLUCOMTR-MCNC: 182 MG/DL — HIGH (ref 70–99)
GLUCOSE BLDC GLUCOMTR-MCNC: 214 MG/DL — HIGH (ref 70–99)
GLUCOSE SERPL-MCNC: 218 MG/DL — HIGH (ref 70–99)
HCT VFR BLD CALC: 34.6 % — SIGNIFICANT CHANGE UP (ref 34.5–45)
HGB BLD-MCNC: 11.2 G/DL — LOW (ref 11.5–15.5)
MAGNESIUM SERPL-MCNC: 1.8 MG/DL — SIGNIFICANT CHANGE UP (ref 1.6–2.6)
MCHC RBC-ENTMCNC: 26.8 PG — LOW (ref 27–34)
MCHC RBC-ENTMCNC: 32.4 % — SIGNIFICANT CHANGE UP (ref 32–36)
MCV RBC AUTO: 82.8 FL — SIGNIFICANT CHANGE UP (ref 80–100)
NRBC # FLD: 0.02 K/UL — SIGNIFICANT CHANGE UP (ref 0–0)
PHOSPHATE SERPL-MCNC: 3.1 MG/DL — SIGNIFICANT CHANGE UP (ref 2.5–4.5)
PLATELET # BLD AUTO: 269 K/UL — SIGNIFICANT CHANGE UP (ref 150–400)
PMV BLD: 11.2 FL — SIGNIFICANT CHANGE UP (ref 7–13)
POTASSIUM SERPL-MCNC: 3.9 MMOL/L — SIGNIFICANT CHANGE UP (ref 3.5–5.3)
POTASSIUM SERPL-SCNC: 3.9 MMOL/L — SIGNIFICANT CHANGE UP (ref 3.5–5.3)
RBC # BLD: 4.18 M/UL — SIGNIFICANT CHANGE UP (ref 3.8–5.2)
RBC # FLD: 13.6 % — SIGNIFICANT CHANGE UP (ref 10.3–14.5)
SODIUM SERPL-SCNC: 127 MMOL/L — LOW (ref 135–145)
WBC # BLD: 6.91 K/UL — SIGNIFICANT CHANGE UP (ref 3.8–10.5)
WBC # FLD AUTO: 6.91 K/UL — SIGNIFICANT CHANGE UP (ref 3.8–10.5)

## 2019-10-07 PROCEDURE — 74018 RADEX ABDOMEN 1 VIEW: CPT | Mod: 26

## 2019-10-07 PROCEDURE — 71045 X-RAY EXAM CHEST 1 VIEW: CPT | Mod: 26

## 2019-10-07 RX ORDER — POTASSIUM CHLORIDE 20 MEQ
10 PACKET (EA) ORAL
Refills: 0 | Status: COMPLETED | OUTPATIENT
Start: 2019-10-07 | End: 2019-10-07

## 2019-10-07 RX ORDER — INSULIN LISPRO 100/ML
VIAL (ML) SUBCUTANEOUS EVERY 6 HOURS
Refills: 0 | Status: DISCONTINUED | OUTPATIENT
Start: 2019-10-07 | End: 2019-10-09

## 2019-10-07 RX ORDER — DEXTROSE MONOHYDRATE, SODIUM CHLORIDE, AND POTASSIUM CHLORIDE 50; .745; 4.5 G/1000ML; G/1000ML; G/1000ML
1000 INJECTION, SOLUTION INTRAVENOUS
Refills: 0 | Status: DISCONTINUED | OUTPATIENT
Start: 2019-10-07 | End: 2019-10-10

## 2019-10-07 RX ORDER — ONDANSETRON 8 MG/1
4 TABLET, FILM COATED ORAL ONCE
Refills: 0 | Status: COMPLETED | OUTPATIENT
Start: 2019-10-07 | End: 2019-10-07

## 2019-10-07 RX ORDER — MAGNESIUM SULFATE 500 MG/ML
2 VIAL (ML) INJECTION ONCE
Refills: 0 | Status: COMPLETED | OUTPATIENT
Start: 2019-10-07 | End: 2019-10-07

## 2019-10-07 RX ORDER — SODIUM CHLORIDE 9 MG/ML
500 INJECTION INTRAMUSCULAR; INTRAVENOUS; SUBCUTANEOUS ONCE
Refills: 0 | Status: COMPLETED | OUTPATIENT
Start: 2019-10-07 | End: 2019-10-07

## 2019-10-07 RX ADMIN — Medication 1: at 18:14

## 2019-10-07 RX ADMIN — Medication 100 MILLIEQUIVALENT(S): at 10:29

## 2019-10-07 RX ADMIN — HEPARIN SODIUM 5000 UNIT(S): 5000 INJECTION INTRAVENOUS; SUBCUTANEOUS at 06:34

## 2019-10-07 RX ADMIN — HEPARIN SODIUM 5000 UNIT(S): 5000 INJECTION INTRAVENOUS; SUBCUTANEOUS at 14:13

## 2019-10-07 RX ADMIN — AMLODIPINE BESYLATE 5 MILLIGRAM(S): 2.5 TABLET ORAL at 06:34

## 2019-10-07 RX ADMIN — Medication 400 UNIT(S): at 11:32

## 2019-10-07 RX ADMIN — PANTOPRAZOLE SODIUM 40 MILLIGRAM(S): 20 TABLET, DELAYED RELEASE ORAL at 06:34

## 2019-10-07 RX ADMIN — SODIUM CHLORIDE 1000 MILLILITER(S): 9 INJECTION INTRAMUSCULAR; INTRAVENOUS; SUBCUTANEOUS at 11:28

## 2019-10-07 RX ADMIN — Medication 1 PACKET(S): at 11:29

## 2019-10-07 RX ADMIN — LOSARTAN POTASSIUM 100 MILLIGRAM(S): 100 TABLET, FILM COATED ORAL at 06:34

## 2019-10-07 RX ADMIN — DEXTROSE MONOHYDRATE, SODIUM CHLORIDE, AND POTASSIUM CHLORIDE 75 MILLILITER(S): 50; .745; 4.5 INJECTION, SOLUTION INTRAVENOUS at 11:13

## 2019-10-07 RX ADMIN — POLYETHYLENE GLYCOL 3350 17 GRAM(S): 17 POWDER, FOR SOLUTION ORAL at 11:29

## 2019-10-07 RX ADMIN — ONDANSETRON 4 MILLIGRAM(S): 8 TABLET, FILM COATED ORAL at 02:50

## 2019-10-07 RX ADMIN — Medication 50 GRAM(S): at 12:13

## 2019-10-07 NOTE — PROGRESS NOTE ADULT - SUBJECTIVE AND OBJECTIVE BOX
GENERAL SURGERY DAILY PROGRESS NOTE:    Interval:  No acute events overnight endorsed.    Subjective:  Patient seen and examined this am. +Vomiting. +Tolerating diet. +Voiding. +Passing flatus. +BM. +OOB. Counselled NPO diet    Vital Signs Last 24 Hrs  T(C): 36.7 (07 Oct 2019 06:29), Max: 37.6 (06 Oct 2019 14:19)  T(F): 98 (07 Oct 2019 06:29), Max: 99.6 (06 Oct 2019 14:19)  HR: 111 (07 Oct 2019 06:29) (95 - 111)  BP: 128/68 (07 Oct 2019 06:29) (128/68 - 165/86)  BP(mean): --  RR: 18 (07 Oct 2019 06:29) (16 - 18)  SpO2: 100% (07 Oct 2019 06:29) (97% - 100%)    Exam:  Gen: NAD, resting in bed, alert and responding appropriately  Resp: Airway patent, non-labored respirations  Abd: Soft, ND, NTTP x 4 quadrants, no rebound or guarding.   Rectum: No prolapse  Ext: No edema, WWP  Neuro: AAOx3, no focal deficits    I&O's Detail    06 Oct 2019 07:01  -  07 Oct 2019 07:00  --------------------------------------------------------  IN:    dextrose 5% + sodium chloride 0.45% with potassium chloride 20 mEq/L: 900 mL    IV PiggyBack: 50 mL    Oral Fluid: 1260 mL  Total IN: 2210 mL    OUT:    Indwelling Catheter - Urethral: 400 mL  Total OUT: 400 mL    Total NET: 1810 mL          Daily     Daily     MEDICATIONS  (STANDING):  amLODIPine   Tablet 5 milliGRAM(s) Oral daily  cholecalciferol 400 Unit(s) Oral daily  dextrose 5% + sodium chloride 0.45% with potassium chloride 20 mEq/L 1000 milliLiter(s) (50 mL/Hr) IV Continuous <Continuous>  dextrose 5%. 1000 milliLiter(s) (50 mL/Hr) IV Continuous <Continuous>  dextrose 50% Injectable 12.5 Gram(s) IV Push once  dextrose 50% Injectable 25 Gram(s) IV Push once  dextrose 50% Injectable 25 Gram(s) IV Push once  heparin  Injectable 5000 Unit(s) SubCutaneous every 8 hours  influenza   Vaccine 0.5 milliLiter(s) IntraMuscular once  insulin lispro (HumaLOG) corrective regimen sliding scale   SubCutaneous three times a day before meals  losartan 100 milliGRAM(s) Oral daily  magnesium sulfate  IVPB 2 Gram(s) IV Intermittent once  pantoprazole    Tablet 40 milliGRAM(s) Oral before breakfast  polyethylene glycol 3350 17 Gram(s) Oral daily  potassium chloride  10 mEq/100 mL IVPB 10 milliEquivalent(s) IV Intermittent every 1 hour  psyllium Powder 1 Packet(s) Oral daily    MEDICATIONS  (PRN):  artificial  tears Solution 1 Drop(s) Both EYES three times a day PRN Dry Eyes  dextrose 40% Gel 15 Gram(s) Oral once PRN Blood Glucose LESS THAN 70 milliGRAM(s)/deciliter  glucagon  Injectable 1 milliGRAM(s) IntraMuscular once PRN Glucose LESS THAN 70 milligrams/deciliter  morphine  - Injectable 2 milliGRAM(s) IV Push every 4 hours PRN Moderate Pain (4 - 6)      LABS:                        11.2   6.91  )-----------( 269      ( 07 Oct 2019 05:40 )             34.6     10-07    127<L>  |  94<L>  |  15  ----------------------------<  218<H>  3.9   |  19<L>  |  0.56    Ca    9.6      07 Oct 2019 05:40  Phos  3.1     10-07  Mg     1.8     10-07            JAGRUTI Foster, PGY-1  A Team Surgery  [id number] with any questions

## 2019-10-07 NOTE — CHART NOTE - NSCHARTNOTEFT_GEN_A_CORE
NGT placement attempted today after patient started to vomit. Approximately 75% of the way down expected length of necessary tube placement, resistance was met. NGT was not advanced any further. Pt has a large hiatal hernia so it was suspected that this was the cause of the resistance. Tube was not flushed with saline or hooked up to suction. CXR showed that NGT was likely in intrathoracic stomach and subsequently removed. Pt had no trouble breathing and was able to speak throughout this process. No issues after NGT removal. NGT placement attempted today after patient started to vomit. Approximately 75% of the way down expected length of necessary tube placement, resistance was met. NGT was not advanced any further. Pt has a large hiatal hernia so it was suspected that this was the cause of the resistance. Tube was not flushed with saline or hooked up to suction. Small amount of bilious fluid in tube (without suction, just in the tube after advancing). CXR showed that NGT was likely in intrathoracic stomach (but unclear) and subsequently removed. Pt had no trouble breathing and was able to speak while the NGT was in place. No issues after NGT removal.    Malika Vila MD  PGY3 NGT placement attempted today after patient started to vomit. Approximately 75% of the way down expected length of necessary tube placement, resistance was met. NGT was not advanced any further. Pt has a large hiatal hernia so it was suspected that this was the cause of the resistance. Tube was not flushed with saline or hooked up to suction. Small amount of bilious fluid in tube (without suction, just in the tube after advancing). CXR showed that NGT was likely in intrathoracic stomach (but unclear) and subsequently removed. Pt had no trouble breathing and was able to speak while the NGT was in place. No issues after NGT removal.    Malika Vila MD  PGY3      Addendum:    NGT replaced at bedside. return of gastric contents. Chest Xray confirming placement in stomach but in hiatal hernia. Will continue NGT to suction for now for decompression and continue to monitor.     A team   [id number]

## 2019-10-07 NOTE — PROGRESS NOTE ADULT - ASSESSMENT
78F s/p open rectopexy on 10/4/19. Pt. healing well and is nearing d/c from the hospital    Plan:   - IVFs  - NPO  - PT: rehab  - f/u GI fx   - Fu AXR official read  - Replete K Mg slightly low on labs

## 2019-10-07 NOTE — PROGRESS NOTE ADULT - ASSESSMENT
78F w/ PMH of Lupus (on long-term prednisone), HTN, Anemia, HLD, Osteoporosis, DM, Gout, Depression, CVA, hx of reducible rectal prolapse admitted for further management.

## 2019-10-07 NOTE — PROGRESS NOTE ADULT - SUBJECTIVE AND OBJECTIVE BOX
Patient is a 78y old  Female who presents with a chief complaint of Rectal prolapse (07 Oct 2019 11:29)      SUBJECTIVE / OVERNIGHT EVENTS: overnight events noted    ROS:  Resp: No cough no sputum production  CVS: No chest pain no palpitations no orthopnea  GI: no nause  : no dysuria, no hematuria  Neuro: no weakness no paresthesias  Heme: No petechiae no easy bruising  Msk: No joint pain no swelling  Skin: No rash no itching        MEDICATIONS  (STANDING):  amLODIPine   Tablet 5 milliGRAM(s) Oral daily  cholecalciferol 400 Unit(s) Oral daily  dextrose 5% + sodium chloride 0.9% with potassium chloride 20 mEq/L 1000 milliLiter(s) (75 mL/Hr) IV Continuous <Continuous>  dextrose 5%. 1000 milliLiter(s) (50 mL/Hr) IV Continuous <Continuous>  dextrose 50% Injectable 12.5 Gram(s) IV Push once  dextrose 50% Injectable 25 Gram(s) IV Push once  dextrose 50% Injectable 25 Gram(s) IV Push once  heparin  Injectable 5000 Unit(s) SubCutaneous every 8 hours  influenza   Vaccine 0.5 milliLiter(s) IntraMuscular once  insulin lispro (HumaLOG) corrective regimen sliding scale   SubCutaneous every 6 hours  losartan 100 milliGRAM(s) Oral daily  magnesium sulfate  IVPB 2 Gram(s) IV Intermittent once  pantoprazole    Tablet 40 milliGRAM(s) Oral before breakfast  polyethylene glycol 3350 17 Gram(s) Oral daily  psyllium Powder 1 Packet(s) Oral daily    MEDICATIONS  (PRN):  artificial  tears Solution 1 Drop(s) Both EYES three times a day PRN Dry Eyes  dextrose 40% Gel 15 Gram(s) Oral once PRN Blood Glucose LESS THAN 70 milliGRAM(s)/deciliter  glucagon  Injectable 1 milliGRAM(s) IntraMuscular once PRN Glucose LESS THAN 70 milligrams/deciliter  morphine  - Injectable 2 milliGRAM(s) IV Push every 4 hours PRN Moderate Pain (4 - 6)        CAPILLARY BLOOD GLUCOSE      POCT Blood Glucose.: 166 mg/dL (07 Oct 2019 12:27)  POCT Blood Glucose.: 214 mg/dL (07 Oct 2019 08:39)  POCT Blood Glucose.: 183 mg/dL (06 Oct 2019 21:14)  POCT Blood Glucose.: 169 mg/dL (06 Oct 2019 17:06)    I&O's Summary    06 Oct 2019 07:01  -  07 Oct 2019 07:00  --------------------------------------------------------  IN: 2210 mL / OUT: 400 mL / NET: 1810 mL        Vital Signs Last 24 Hrs  T(C): 36.6 (07 Oct 2019 15:06), Max: 37 (06 Oct 2019 17:43)  T(F): 97.9 (07 Oct 2019 15:06), Max: 98.6 (06 Oct 2019 17:43)  HR: 71 (07 Oct 2019 15:06) (70 - 111)  BP: 127/79 (07 Oct 2019 15:06) (127/79 - 151/72)  BP(mean): --  RR: 18 (07 Oct 2019 15:06) (16 - 18)  SpO2: 97% (07 Oct 2019 15:06) (97% - 100%)    PHYSICAL EXAM:  GENERAL: NAD, well-developed  HEAD:  Atraumatic, Normocephalic  EYES: EOMI, PERRLA, conjunctiva and sclera clear  NECK: Supple, No JVD  CHEST/LUNG: no rhonchi, no wheeze, clear to auscultation bilaterally  HEART: S1 S2; No rubs or gallops, no murmurs appreciated  ABDOMEN: Soft, Nontender, Nondistended; Bowel sounds present  EXTREMITIES:  No clubbing or cyanosis, + Peripheral Pulses,  no edema  PSYCH: AO x 3 appropriate affect  NEUROLOGY: non-focal, motor and sensory systems intact  SKIN: No rashes or lesions    LABS:                        11.2   6.91  )-----------( 269      ( 07 Oct 2019 05:40 )             34.6     10-07    127<L>  |  94<L>  |  15  ----------------------------<  218<H>  3.9   |  19<L>  |  0.56    Ca    9.6      07 Oct 2019 05:40  Phos  3.1     10-07  Mg     1.8     10-07                  All consultant(s) notes reviewed and care discussed with other providers        Contact Number, Dr Bishop [phone] Patient is a 78y old  Female who presents with a chief complaint of Rectal prolapse (07 Oct 2019 11:29)      SUBJECTIVE / OVERNIGHT EVENTS: overnight events noted  + vomiting     ROS:  Resp: No cough no sputum production  CVS: No chest pain no palpitations no orthopnea  : no dysuria, no hematuria  Neuro: no weakness no paresthesias  Heme: No petechiae no easy bruising  Msk: No joint pain no swelling  Skin: No rash no itching        MEDICATIONS  (STANDING):  amLODIPine   Tablet 5 milliGRAM(s) Oral daily  cholecalciferol 400 Unit(s) Oral daily  dextrose 5% + sodium chloride 0.9% with potassium chloride 20 mEq/L 1000 milliLiter(s) (75 mL/Hr) IV Continuous <Continuous>  dextrose 5%. 1000 milliLiter(s) (50 mL/Hr) IV Continuous <Continuous>  dextrose 50% Injectable 12.5 Gram(s) IV Push once  dextrose 50% Injectable 25 Gram(s) IV Push once  dextrose 50% Injectable 25 Gram(s) IV Push once  heparin  Injectable 5000 Unit(s) SubCutaneous every 8 hours  influenza   Vaccine 0.5 milliLiter(s) IntraMuscular once  insulin lispro (HumaLOG) corrective regimen sliding scale   SubCutaneous every 6 hours  losartan 100 milliGRAM(s) Oral daily  magnesium sulfate  IVPB 2 Gram(s) IV Intermittent once  pantoprazole    Tablet 40 milliGRAM(s) Oral before breakfast  polyethylene glycol 3350 17 Gram(s) Oral daily  psyllium Powder 1 Packet(s) Oral daily    MEDICATIONS  (PRN):  artificial  tears Solution 1 Drop(s) Both EYES three times a day PRN Dry Eyes  dextrose 40% Gel 15 Gram(s) Oral once PRN Blood Glucose LESS THAN 70 milliGRAM(s)/deciliter  glucagon  Injectable 1 milliGRAM(s) IntraMuscular once PRN Glucose LESS THAN 70 milligrams/deciliter  morphine  - Injectable 2 milliGRAM(s) IV Push every 4 hours PRN Moderate Pain (4 - 6)        CAPILLARY BLOOD GLUCOSE      POCT Blood Glucose.: 166 mg/dL (07 Oct 2019 12:27)  POCT Blood Glucose.: 214 mg/dL (07 Oct 2019 08:39)  POCT Blood Glucose.: 183 mg/dL (06 Oct 2019 21:14)  POCT Blood Glucose.: 169 mg/dL (06 Oct 2019 17:06)    I&O's Summary    06 Oct 2019 07:01  -  07 Oct 2019 07:00  --------------------------------------------------------  IN: 2210 mL / OUT: 400 mL / NET: 1810 mL        Vital Signs Last 24 Hrs  T(C): 36.6 (07 Oct 2019 15:06), Max: 37 (06 Oct 2019 17:43)  T(F): 97.9 (07 Oct 2019 15:06), Max: 98.6 (06 Oct 2019 17:43)  HR: 71 (07 Oct 2019 15:06) (70 - 111)  BP: 127/79 (07 Oct 2019 15:06) (127/79 - 151/72)  BP(mean): --  RR: 18 (07 Oct 2019 15:06) (16 - 18)  SpO2: 97% (07 Oct 2019 15:06) (97% - 100%)    PHYSICAL EXAM:  GENERAL: NAD, cachectic  HEAD:  Atraumatic, Normocephalic  EYES: EOMI, PERRLA, conjunctiva and sclera clear  NECK: Supple, No JVD  CHEST/LUNG: Clear to auscultation bilaterally; No wheeze  HEART: S1S2; No rubs, or gallops, no murmurs  ABDOMEN: Soft, Nontender, Nondistended; Bowel sounds present  EXTREMITIES:  + Peripheral Pulses, No clubbing or cyanosis, no edema  PSYCH: AO x 3,   NEUROLOGY: mild left mild residual hemiparesis  SKIN: No rashes or lesions    LABS:                        11.2   6.91  )-----------( 269      ( 07 Oct 2019 05:40 )             34.6     10-07    127<L>  |  94<L>  |  15  ----------------------------<  218<H>  3.9   |  19<L>  |  0.56    Ca    9.6      07 Oct 2019 05:40  Phos  3.1     10-07  Mg     1.8     10-07                  All consultant(s) notes reviewed and care discussed with other providers        Contact Number, Dr Bishop [phone]

## 2019-10-07 NOTE — PROGRESS NOTE ADULT - SUBJECTIVE AND OBJECTIVE BOX
INTERVAL HPI/OVERNIGHT EVENTS:    c/o abd pain/distention   had episode of emesis during visit     MEDICATIONS  (STANDING):  amLODIPine   Tablet 5 milliGRAM(s) Oral daily  cholecalciferol 400 Unit(s) Oral daily  dextrose 5% + sodium chloride 0.9% with potassium chloride 20 mEq/L 1000 milliLiter(s) (75 mL/Hr) IV Continuous <Continuous>  dextrose 5%. 1000 milliLiter(s) (50 mL/Hr) IV Continuous <Continuous>  dextrose 50% Injectable 12.5 Gram(s) IV Push once  dextrose 50% Injectable 25 Gram(s) IV Push once  dextrose 50% Injectable 25 Gram(s) IV Push once  heparin  Injectable 5000 Unit(s) SubCutaneous every 8 hours  influenza   Vaccine 0.5 milliLiter(s) IntraMuscular once  insulin lispro (HumaLOG) corrective regimen sliding scale   SubCutaneous every 6 hours  losartan 100 milliGRAM(s) Oral daily  magnesium sulfate  IVPB 2 Gram(s) IV Intermittent once  pantoprazole    Tablet 40 milliGRAM(s) Oral before breakfast  polyethylene glycol 3350 17 Gram(s) Oral daily  potassium chloride  10 mEq/100 mL IVPB 10 milliEquivalent(s) IV Intermittent every 1 hour  psyllium Powder 1 Packet(s) Oral daily  sodium chloride 0.9% Bolus 500 milliLiter(s) IV Bolus once    MEDICATIONS  (PRN):  artificial  tears Solution 1 Drop(s) Both EYES three times a day PRN Dry Eyes  dextrose 40% Gel 15 Gram(s) Oral once PRN Blood Glucose LESS THAN 70 milliGRAM(s)/deciliter  glucagon  Injectable 1 milliGRAM(s) IntraMuscular once PRN Glucose LESS THAN 70 milligrams/deciliter  morphine  - Injectable 2 milliGRAM(s) IV Push every 4 hours PRN Moderate Pain (4 - 6)      Allergies    SULFA (Unknown)  sulfa drugs (Unknown)    Intolerances        Review of Systems:    General:  No wt loss, fevers, chills, night sweats, fatigue   Eyes:  Good vision, no reported pain  ENT:  No sore throat, pain, runny nose, dysphagia  CV:  No pain, palpitations, hypo/hypertension  Resp:  No dyspnea, cough, tachypnea, wheezing  GI:  +pain, +nausea, +vomiting, No diarrhea, No constipation, No weight loss, No fever, No pruritis, No rectal bleeding, No melena, No dysphagia  :  No pain, bleeding, incontinence, nocturia  Muscle:  No pain, weakness  Neuro:  No weakness, tingling, memory problems  Psych:  No fatigue, insomnia, mood problems, depression  Endocrine:  No polyuria, polydypsia, cold/heat intolerance  Heme:  No petechiae, ecchymosis, easy bruisability  Skin:  No rash, tattoos, scars, edema      Vital Signs Last 24 Hrs  T(C): 36.7 (07 Oct 2019 10:01), Max: 37.6 (06 Oct 2019 14:19)  T(F): 98 (07 Oct 2019 10:01), Max: 99.6 (06 Oct 2019 14:19)  HR: 70 (07 Oct 2019 10:01) (70 - 111)  BP: 148/84 (07 Oct 2019 10:01) (128/68 - 151/72)  BP(mean): --  RR: 18 (07 Oct 2019 10:01) (16 - 18)  SpO2: 98% (07 Oct 2019 10:01) (97% - 100%)    PHYSICAL EXAM:    Constitutional: NAD  HEENT: EOMI, throat clear  Neck: No LAD, supple  Respiratory: CTA and P  Cardiovascular: S1 and S2, RRR, no M  Gastrointestinal: BS+, neg HSM, +ttp diffusely and +distention; +dark green emesis   Extremities: No peripheral edema, neg clubbing, cyanosis  Vascular: 2+ peripheral pulses  Neurological: A/O x 3, no focal deficits  Psychiatric: Normal mood, normal affect  Skin: No rashes      LABS:                        11.2   6.91  )-----------( 269      ( 07 Oct 2019 05:40 )             34.6     10-07    127<L>  |  94<L>  |  15  ----------------------------<  218<H>  3.9   |  19<L>  |  0.56    Ca    9.6      07 Oct 2019 05:40  Phos  3.1     10-07  Mg     1.8     10-07            RADIOLOGY & ADDITIONAL TESTS:

## 2019-10-08 DIAGNOSIS — R11.10 VOMITING, UNSPECIFIED: ICD-10-CM

## 2019-10-08 LAB
ANION GAP SERPL CALC-SCNC: 12 MMO/L — SIGNIFICANT CHANGE UP (ref 7–14)
BUN SERPL-MCNC: 15 MG/DL — SIGNIFICANT CHANGE UP (ref 7–23)
CALCIUM SERPL-MCNC: 9 MG/DL — SIGNIFICANT CHANGE UP (ref 8.4–10.5)
CHLORIDE SERPL-SCNC: 100 MMOL/L — SIGNIFICANT CHANGE UP (ref 98–107)
CO2 SERPL-SCNC: 20 MMOL/L — LOW (ref 22–31)
CREAT SERPL-MCNC: 0.49 MG/DL — LOW (ref 0.5–1.3)
GLUCOSE BLDC GLUCOMTR-MCNC: 113 MG/DL — HIGH (ref 70–99)
GLUCOSE BLDC GLUCOMTR-MCNC: 122 MG/DL — HIGH (ref 70–99)
GLUCOSE BLDC GLUCOMTR-MCNC: 132 MG/DL — HIGH (ref 70–99)
GLUCOSE BLDC GLUCOMTR-MCNC: 139 MG/DL — HIGH (ref 70–99)
GLUCOSE SERPL-MCNC: 134 MG/DL — HIGH (ref 70–99)
HCT VFR BLD CALC: 29 % — LOW (ref 34.5–45)
HGB BLD-MCNC: 9.3 G/DL — LOW (ref 11.5–15.5)
MAGNESIUM SERPL-MCNC: 1.7 MG/DL — SIGNIFICANT CHANGE UP (ref 1.6–2.6)
MCHC RBC-ENTMCNC: 26.5 PG — LOW (ref 27–34)
MCHC RBC-ENTMCNC: 32.1 % — SIGNIFICANT CHANGE UP (ref 32–36)
MCV RBC AUTO: 82.6 FL — SIGNIFICANT CHANGE UP (ref 80–100)
NRBC # FLD: 0 K/UL — SIGNIFICANT CHANGE UP (ref 0–0)
PHOSPHATE SERPL-MCNC: 2.4 MG/DL — LOW (ref 2.5–4.5)
PLATELET # BLD AUTO: 246 K/UL — SIGNIFICANT CHANGE UP (ref 150–400)
PMV BLD: 10.7 FL — SIGNIFICANT CHANGE UP (ref 7–13)
POTASSIUM SERPL-MCNC: 4.2 MMOL/L — SIGNIFICANT CHANGE UP (ref 3.5–5.3)
POTASSIUM SERPL-SCNC: 4.2 MMOL/L — SIGNIFICANT CHANGE UP (ref 3.5–5.3)
RBC # BLD: 3.51 M/UL — LOW (ref 3.8–5.2)
RBC # FLD: 13.7 % — SIGNIFICANT CHANGE UP (ref 10.3–14.5)
SODIUM SERPL-SCNC: 132 MMOL/L — LOW (ref 135–145)
WBC # BLD: 7.27 K/UL — SIGNIFICANT CHANGE UP (ref 3.8–10.5)
WBC # FLD AUTO: 7.27 K/UL — SIGNIFICANT CHANGE UP (ref 3.8–10.5)

## 2019-10-08 PROCEDURE — 71045 X-RAY EXAM CHEST 1 VIEW: CPT | Mod: 26

## 2019-10-08 RX ORDER — MAGNESIUM SULFATE 500 MG/ML
2 VIAL (ML) INJECTION ONCE
Refills: 0 | Status: COMPLETED | OUTPATIENT
Start: 2019-10-08 | End: 2019-10-08

## 2019-10-08 RX ADMIN — HEPARIN SODIUM 5000 UNIT(S): 5000 INJECTION INTRAVENOUS; SUBCUTANEOUS at 22:42

## 2019-10-08 RX ADMIN — HEPARIN SODIUM 5000 UNIT(S): 5000 INJECTION INTRAVENOUS; SUBCUTANEOUS at 07:10

## 2019-10-08 RX ADMIN — HEPARIN SODIUM 5000 UNIT(S): 5000 INJECTION INTRAVENOUS; SUBCUTANEOUS at 22:59

## 2019-10-08 RX ADMIN — Medication 50 GRAM(S): at 19:10

## 2019-10-08 RX ADMIN — Medication 63.75 MILLIMOLE(S): at 14:28

## 2019-10-08 RX ADMIN — HEPARIN SODIUM 5000 UNIT(S): 5000 INJECTION INTRAVENOUS; SUBCUTANEOUS at 13:21

## 2019-10-08 NOTE — PROGRESS NOTE ADULT - SUBJECTIVE AND OBJECTIVE BOX
INTERVAL HPI/OVERNIGHT EVENTS:    seen this morning   ngt in place but with low output   pt with mucoid stools, abd pain better than yesterday   denied any nausea     MEDICATIONS  (STANDING):  amLODIPine   Tablet 5 milliGRAM(s) Oral daily  cholecalciferol 400 Unit(s) Oral daily  dextrose 5% + sodium chloride 0.9% with potassium chloride 20 mEq/L 1000 milliLiter(s) (75 mL/Hr) IV Continuous <Continuous>  dextrose 5%. 1000 milliLiter(s) (50 mL/Hr) IV Continuous <Continuous>  dextrose 50% Injectable 12.5 Gram(s) IV Push once  dextrose 50% Injectable 25 Gram(s) IV Push once  dextrose 50% Injectable 25 Gram(s) IV Push once  heparin  Injectable 5000 Unit(s) SubCutaneous every 8 hours  influenza   Vaccine 0.5 milliLiter(s) IntraMuscular once  insulin lispro (HumaLOG) corrective regimen sliding scale   SubCutaneous every 6 hours  losartan 100 milliGRAM(s) Oral daily  pantoprazole    Tablet 40 milliGRAM(s) Oral before breakfast  polyethylene glycol 3350 17 Gram(s) Oral daily  psyllium Powder 1 Packet(s) Oral daily    MEDICATIONS  (PRN):  artificial  tears Solution 1 Drop(s) Both EYES three times a day PRN Dry Eyes  dextrose 40% Gel 15 Gram(s) Oral once PRN Blood Glucose LESS THAN 70 milliGRAM(s)/deciliter  glucagon  Injectable 1 milliGRAM(s) IntraMuscular once PRN Glucose LESS THAN 70 milligrams/deciliter  morphine  - Injectable 2 milliGRAM(s) IV Push every 4 hours PRN Moderate Pain (4 - 6)      Allergies    SULFA (Unknown)  sulfa drugs (Unknown)    Intolerances        Review of Systems:    General:  No wt loss, fevers, chills, night sweats, fatigue   Eyes:  Good vision, no reported pain  ENT:  No sore throat, pain, runny nose, dysphagia  CV:  No pain, palpitations, hypo/hypertension  Resp:  No dyspnea, cough, tachypnea, wheezing  GI:  No pain, No nausea, No vomiting, No diarrhea, No constipation, No weight loss, No fever, No pruritis, No rectal bleeding, No melena, No dysphagia  :  No pain, bleeding, incontinence, nocturia  Muscle:  No pain, weakness  Neuro:  No weakness, tingling, memory problems  Psych:  No fatigue, insomnia, mood problems, depression  Endocrine:  No polyuria, polydypsia, cold/heat intolerance  Heme:  No petechiae, ecchymosis, easy bruisability  Skin:  No rash, tattoos, scars, edema      Vital Signs Last 24 Hrs  T(C): 37.1 (08 Oct 2019 06:11), Max: 37.1 (08 Oct 2019 03:15)  T(F): 98.8 (08 Oct 2019 06:11), Max: 98.8 (08 Oct 2019 06:11)  HR: 95 (08 Oct 2019 06:11) (71 - 118)  BP: 128/58 (08 Oct 2019 06:11) (106/85 - 142/66)  BP(mean): --  RR: 17 (08 Oct 2019 06:11) (17 - 18)  SpO2: 96% (08 Oct 2019 06:11) (96% - 100%)    PHYSICAL EXAM:    Constitutional: NAD  HEENT: EOMI, throat clear +NGT  Neck: No LAD, supple  Respiratory: CTA and P  Cardiovascular: S1 and S2, RRR, no M  Gastrointestinal: BS+, soft, NT/ND, neg HSM,  Extremities: No peripheral edema, neg clubbing, cyanosis  Vascular: 2+ peripheral pulses  Neurological: A/O x 3, no focal deficits  Psychiatric: Normal mood, normal affect  Skin: No rashes      LABS:                        9.3    7.27  )-----------( 246      ( 08 Oct 2019 06:45 )             29.0     10-08    132<L>  |  100  |  15  ----------------------------<  134<H>  4.2   |  20<L>  |  0.49<L>    Ca    9.0      08 Oct 2019 06:45  Phos  2.4     10-08  Mg     1.7     10-08            RADIOLOGY & ADDITIONAL TESTS:

## 2019-10-08 NOTE — PROGRESS NOTE ADULT - SUBJECTIVE AND OBJECTIVE BOX
GENERAL SURGERY DAILY PROGRESS NOTE:    Interval:  Pulled out NGT ovn, replacement NGT coiled in hiatal hernia, tegadermed to L face.    Subjective:  Patient seen and examined this am. Reports pain is well controlled. No other complaints. Denies fever. Denies HA/CP/SOB. Denies N/V/D. +Tolerating diet. +Voiding. +Passing flatus. +BM. +OOB.    Vital Signs Last 24 Hrs  T(C): 37.1 (08 Oct 2019 06:11), Max: 37.1 (08 Oct 2019 03:15)  T(F): 98.8 (08 Oct 2019 06:11), Max: 98.8 (08 Oct 2019 06:11)  HR: 95 (08 Oct 2019 06:11) (71 - 118)  BP: 128/58 (08 Oct 2019 06:11) (106/85 - 142/66)  BP(mean): --  RR: 17 (08 Oct 2019 06:11) (17 - 18)  SpO2: 96% (08 Oct 2019 06:11) (96% - 100%)    Exam:  Gen: NAD, resting in bed, alert and responding appropriately NGT flushed  Resp: Airway patent, non-labored respirations  Abd: Soft, ND, NTTP x 4 quadrants, no rebound or guarding.   Ext: No edema, WWP  Neuro: AAOx3, no focal deficits    I&O's Detail    07 Oct 2019 07:01  -  08 Oct 2019 07:00  --------------------------------------------------------  IN:    dextrose 5% + sodium chloride 0.45% with potassium chloride 20 mEq/L: 200 mL    dextrose 5% + sodium chloride 0.9% with potassium chloride 20 mEq/L: 1350 mL    IV PiggyBack: 150 mL    Sodium Chloride 0.9% IV Bolus: 500 mL  Total IN: 2200 mL    OUT:    Nasoenteral Tube: 600 mL  Total OUT: 600 mL    Total NET: 1600 mL          Daily     Daily     MEDICATIONS  (STANDING):  amLODIPine   Tablet 5 milliGRAM(s) Oral daily  cholecalciferol 400 Unit(s) Oral daily  dextrose 5% + sodium chloride 0.9% with potassium chloride 20 mEq/L 1000 milliLiter(s) (75 mL/Hr) IV Continuous <Continuous>  dextrose 5%. 1000 milliLiter(s) (50 mL/Hr) IV Continuous <Continuous>  dextrose 50% Injectable 12.5 Gram(s) IV Push once  dextrose 50% Injectable 25 Gram(s) IV Push once  dextrose 50% Injectable 25 Gram(s) IV Push once  heparin  Injectable 5000 Unit(s) SubCutaneous every 8 hours  influenza   Vaccine 0.5 milliLiter(s) IntraMuscular once  insulin lispro (HumaLOG) corrective regimen sliding scale   SubCutaneous every 6 hours  losartan 100 milliGRAM(s) Oral daily  magnesium sulfate  IVPB 2 Gram(s) IV Intermittent once  pantoprazole    Tablet 40 milliGRAM(s) Oral before breakfast  polyethylene glycol 3350 17 Gram(s) Oral daily  psyllium Powder 1 Packet(s) Oral daily    MEDICATIONS  (PRN):  artificial  tears Solution 1 Drop(s) Both EYES three times a day PRN Dry Eyes  dextrose 40% Gel 15 Gram(s) Oral once PRN Blood Glucose LESS THAN 70 milliGRAM(s)/deciliter  glucagon  Injectable 1 milliGRAM(s) IntraMuscular once PRN Glucose LESS THAN 70 milligrams/deciliter  morphine  - Injectable 2 milliGRAM(s) IV Push every 4 hours PRN Moderate Pain (4 - 6)      LABS:                        9.3    7.27  )-----------( 246      ( 08 Oct 2019 06:45 )             29.0     10-08    132<L>  |  100  |  15  ----------------------------<  134<H>  4.2   |  20<L>  |  0.49<L>    Ca    9.0      08 Oct 2019 06:45  Phos  2.4     10-08  Mg     1.7     10-08            JAGRUTI Foster, PGY-1  A Team Surgery  [id number] with any questions

## 2019-10-08 NOTE — PROGRESS NOTE ADULT - SUBJECTIVE AND OBJECTIVE BOX
Patient is a 78y old  Female who presents with a chief complaint of Rectal prolapse (08 Oct 2019 14:31)      SUBJECTIVE / OVERNIGHT EVENTS: overnight events noted    ROS:  Resp: No cough no sputum production  CVS: No chest pain no palpitations no orthopnea  GI: no N/V/D  : no dysuria, no hematuria  Neuro: no weakness no paresthesias  Heme: No petechiae no easy bruising  Msk: No joint pain no swelling  Skin: No rash no itching        MEDICATIONS  (STANDING):  amLODIPine   Tablet 5 milliGRAM(s) Oral daily  cholecalciferol 400 Unit(s) Oral daily  dextrose 5% + sodium chloride 0.9% with potassium chloride 20 mEq/L 1000 milliLiter(s) (75 mL/Hr) IV Continuous <Continuous>  dextrose 5%. 1000 milliLiter(s) (50 mL/Hr) IV Continuous <Continuous>  dextrose 50% Injectable 12.5 Gram(s) IV Push once  dextrose 50% Injectable 25 Gram(s) IV Push once  dextrose 50% Injectable 25 Gram(s) IV Push once  heparin  Injectable 5000 Unit(s) SubCutaneous every 8 hours  influenza   Vaccine 0.5 milliLiter(s) IntraMuscular once  insulin lispro (HumaLOG) corrective regimen sliding scale   SubCutaneous every 6 hours  losartan 100 milliGRAM(s) Oral daily  magnesium sulfate  IVPB 2 Gram(s) IV Intermittent once  pantoprazole    Tablet 40 milliGRAM(s) Oral before breakfast  polyethylene glycol 3350 17 Gram(s) Oral daily  psyllium Powder 1 Packet(s) Oral daily    MEDICATIONS  (PRN):  artificial  tears Solution 1 Drop(s) Both EYES three times a day PRN Dry Eyes  dextrose 40% Gel 15 Gram(s) Oral once PRN Blood Glucose LESS THAN 70 milliGRAM(s)/deciliter  glucagon  Injectable 1 milliGRAM(s) IntraMuscular once PRN Glucose LESS THAN 70 milligrams/deciliter  morphine  - Injectable 2 milliGRAM(s) IV Push every 4 hours PRN Moderate Pain (4 - 6)        CAPILLARY BLOOD GLUCOSE      POCT Blood Glucose.: 139 mg/dL (08 Oct 2019 12:15)  POCT Blood Glucose.: 122 mg/dL (08 Oct 2019 07:23)  POCT Blood Glucose.: 118 mg/dL (07 Oct 2019 23:21)  POCT Blood Glucose.: 182 mg/dL (07 Oct 2019 17:50)    I&O's Summary    07 Oct 2019 07:01  -  08 Oct 2019 07:00  --------------------------------------------------------  IN: 2200 mL / OUT: 600 mL / NET: 1600 mL    08 Oct 2019 07:01  -  08 Oct 2019 16:04  --------------------------------------------------------  IN: 850 mL / OUT: 0 mL / NET: 850 mL        Vital Signs Last 24 Hrs  T(C): 36.9 (08 Oct 2019 14:14), Max: 37.1 (08 Oct 2019 03:15)  T(F): 98.5 (08 Oct 2019 14:14), Max: 98.8 (08 Oct 2019 06:11)  HR: 92 (08 Oct 2019 14:14) (78 - 118)  BP: 130/60 (08 Oct 2019 14:14) (106/85 - 142/66)  BP(mean): --  RR: 16 (08 Oct 2019 14:14) (16 - 18)  SpO2: 100% (08 Oct 2019 14:14) (96% - 100%)    PHYSICAL EXAM:  GENERAL: NAD, cachectic  HEAD:  Atraumatic, Normocephalic  EYES: EOMI, PERRLA, conjunctiva and sclera clear  NECK: Supple, No JVD  CHEST/LUNG: Clear to auscultation bilaterally; No wheeze  HEART: S1S2; No rubs, or gallops, no murmurs  ABDOMEN: Soft, Nontender, Nondistended; Bowel sounds present  EXTREMITIES:  + Peripheral Pulses, No clubbing or cyanosis, no edema  NEUROLOGY: mild left mild residual hemiparesis  SKIN: No rashes or lesions    LABS:                        9.3    7.27  )-----------( 246      ( 08 Oct 2019 06:45 )             29.0     10-08    132<L>  |  100  |  15  ----------------------------<  134<H>  4.2   |  20<L>  |  0.49<L>    Ca    9.0      08 Oct 2019 06:45  Phos  2.4     10-08  Mg     1.7     10-08                  All consultant(s) notes reviewed and care discussed with other providers        Contact Number, Dr Bishop [phone]

## 2019-10-08 NOTE — PROGRESS NOTE ADULT - ASSESSMENT
78F s/p open rectopexy on 10/4/19. Pt. healing well and is nearing d/c from the hospital    Plan:   - IVFs  - NPO  - PT: rehab  - f/u GI fx   - NGT clamp trial  - Replete Na Mg Phos for hyponatremia slightly low Mg and hypophosphatemia on labs

## 2019-10-09 LAB
ANION GAP SERPL CALC-SCNC: 13 MMO/L — SIGNIFICANT CHANGE UP (ref 7–14)
BUN SERPL-MCNC: 9 MG/DL — SIGNIFICANT CHANGE UP (ref 7–23)
CALCIUM SERPL-MCNC: 8.7 MG/DL — SIGNIFICANT CHANGE UP (ref 8.4–10.5)
CHLORIDE SERPL-SCNC: 103 MMOL/L — SIGNIFICANT CHANGE UP (ref 98–107)
CO2 SERPL-SCNC: 20 MMOL/L — LOW (ref 22–31)
CREAT SERPL-MCNC: 0.38 MG/DL — LOW (ref 0.5–1.3)
GLUCOSE BLDC GLUCOMTR-MCNC: 116 MG/DL — HIGH (ref 70–99)
GLUCOSE BLDC GLUCOMTR-MCNC: 124 MG/DL — HIGH (ref 70–99)
GLUCOSE BLDC GLUCOMTR-MCNC: 136 MG/DL — HIGH (ref 70–99)
GLUCOSE BLDC GLUCOMTR-MCNC: 155 MG/DL — HIGH (ref 70–99)
GLUCOSE BLDC GLUCOMTR-MCNC: 161 MG/DL — HIGH (ref 70–99)
GLUCOSE SERPL-MCNC: 129 MG/DL — HIGH (ref 70–99)
HCT VFR BLD CALC: 31.3 % — LOW (ref 34.5–45)
HGB BLD-MCNC: 10 G/DL — LOW (ref 11.5–15.5)
MAGNESIUM SERPL-MCNC: 1.7 MG/DL — SIGNIFICANT CHANGE UP (ref 1.6–2.6)
MCHC RBC-ENTMCNC: 26.7 PG — LOW (ref 27–34)
MCHC RBC-ENTMCNC: 31.9 % — LOW (ref 32–36)
MCV RBC AUTO: 83.5 FL — SIGNIFICANT CHANGE UP (ref 80–100)
NRBC # FLD: 0 K/UL — SIGNIFICANT CHANGE UP (ref 0–0)
PHOSPHATE SERPL-MCNC: 2.8 MG/DL — SIGNIFICANT CHANGE UP (ref 2.5–4.5)
PLATELET # BLD AUTO: 279 K/UL — SIGNIFICANT CHANGE UP (ref 150–400)
PMV BLD: 11 FL — SIGNIFICANT CHANGE UP (ref 7–13)
POTASSIUM SERPL-MCNC: 5.1 MMOL/L — SIGNIFICANT CHANGE UP (ref 3.5–5.3)
POTASSIUM SERPL-SCNC: 5.1 MMOL/L — SIGNIFICANT CHANGE UP (ref 3.5–5.3)
RBC # BLD: 3.75 M/UL — LOW (ref 3.8–5.2)
RBC # FLD: 14 % — SIGNIFICANT CHANGE UP (ref 10.3–14.5)
SODIUM SERPL-SCNC: 136 MMOL/L — SIGNIFICANT CHANGE UP (ref 135–145)
WBC # BLD: 5.4 K/UL — SIGNIFICANT CHANGE UP (ref 3.8–10.5)
WBC # FLD AUTO: 5.4 K/UL — SIGNIFICANT CHANGE UP (ref 3.8–10.5)

## 2019-10-09 RX ORDER — DEXTROSE 50 % IN WATER 50 %
12.5 SYRINGE (ML) INTRAVENOUS ONCE
Refills: 0 | Status: DISCONTINUED | OUTPATIENT
Start: 2019-10-09 | End: 2019-10-14

## 2019-10-09 RX ORDER — INSULIN LISPRO 100/ML
VIAL (ML) SUBCUTANEOUS AT BEDTIME
Refills: 0 | Status: DISCONTINUED | OUTPATIENT
Start: 2019-10-09 | End: 2019-10-14

## 2019-10-09 RX ORDER — ACETAMINOPHEN 500 MG
650 TABLET ORAL EVERY 6 HOURS
Refills: 0 | Status: DISCONTINUED | OUTPATIENT
Start: 2019-10-09 | End: 2019-10-14

## 2019-10-09 RX ORDER — DEXTROSE 50 % IN WATER 50 %
25 SYRINGE (ML) INTRAVENOUS ONCE
Refills: 0 | Status: DISCONTINUED | OUTPATIENT
Start: 2019-10-09 | End: 2019-10-14

## 2019-10-09 RX ORDER — MAGNESIUM SULFATE 500 MG/ML
2 VIAL (ML) INJECTION ONCE
Refills: 0 | Status: COMPLETED | OUTPATIENT
Start: 2019-10-09 | End: 2019-10-09

## 2019-10-09 RX ORDER — SODIUM CHLORIDE 9 MG/ML
1000 INJECTION, SOLUTION INTRAVENOUS
Refills: 0 | Status: DISCONTINUED | OUTPATIENT
Start: 2019-10-09 | End: 2019-10-14

## 2019-10-09 RX ORDER — INSULIN LISPRO 100/ML
VIAL (ML) SUBCUTANEOUS
Refills: 0 | Status: DISCONTINUED | OUTPATIENT
Start: 2019-10-09 | End: 2019-10-14

## 2019-10-09 RX ORDER — GLUCAGON INJECTION, SOLUTION 0.5 MG/.1ML
1 INJECTION, SOLUTION SUBCUTANEOUS ONCE
Refills: 0 | Status: DISCONTINUED | OUTPATIENT
Start: 2019-10-09 | End: 2019-10-14

## 2019-10-09 RX ORDER — DEXTROSE 50 % IN WATER 50 %
15 SYRINGE (ML) INTRAVENOUS ONCE
Refills: 0 | Status: DISCONTINUED | OUTPATIENT
Start: 2019-10-09 | End: 2019-10-14

## 2019-10-09 RX ADMIN — HEPARIN SODIUM 5000 UNIT(S): 5000 INJECTION INTRAVENOUS; SUBCUTANEOUS at 22:28

## 2019-10-09 RX ADMIN — HEPARIN SODIUM 5000 UNIT(S): 5000 INJECTION INTRAVENOUS; SUBCUTANEOUS at 05:30

## 2019-10-09 RX ADMIN — Medication 1: at 13:11

## 2019-10-09 RX ADMIN — HEPARIN SODIUM 5000 UNIT(S): 5000 INJECTION INTRAVENOUS; SUBCUTANEOUS at 13:13

## 2019-10-09 RX ADMIN — Medication 400 UNIT(S): at 13:13

## 2019-10-09 RX ADMIN — Medication 50 GRAM(S): at 13:12

## 2019-10-09 RX ADMIN — Medication 1: at 17:59

## 2019-10-09 NOTE — PROGRESS NOTE ADULT - ASSESSMENT
78F s/p open rectopexy on 10/4/19 recovering appropriately advancing to clears today    Plan:   - Advance to clears  - PT: rehab  - f/u GI fx   - OOB as tolerated  - Seen and discussed with a team

## 2019-10-09 NOTE — PROGRESS NOTE ADULT - SUBJECTIVE AND OBJECTIVE BOX
INTERVAL HPI/OVERNIGHT EVENTS:    seen this morning; reported BM   no abd pain     MEDICATIONS  (STANDING):  amLODIPine   Tablet 5 milliGRAM(s) Oral daily  cholecalciferol 400 Unit(s) Oral daily  dextrose 5% + sodium chloride 0.9% with potassium chloride 20 mEq/L 1000 milliLiter(s) (75 mL/Hr) IV Continuous <Continuous>  dextrose 5%. 1000 milliLiter(s) (50 mL/Hr) IV Continuous <Continuous>  dextrose 50% Injectable 12.5 Gram(s) IV Push once  dextrose 50% Injectable 25 Gram(s) IV Push once  dextrose 50% Injectable 25 Gram(s) IV Push once  heparin  Injectable 5000 Unit(s) SubCutaneous every 8 hours  influenza   Vaccine 0.5 milliLiter(s) IntraMuscular once  insulin lispro (HumaLOG) corrective regimen sliding scale   SubCutaneous three times a day before meals  losartan 100 milliGRAM(s) Oral daily  pantoprazole    Tablet 40 milliGRAM(s) Oral before breakfast  polyethylene glycol 3350 17 Gram(s) Oral daily  psyllium Powder 1 Packet(s) Oral daily    MEDICATIONS  (PRN):  artificial  tears Solution 1 Drop(s) Both EYES three times a day PRN Dry Eyes  dextrose 40% Gel 15 Gram(s) Oral once PRN Blood Glucose LESS THAN 70 milliGRAM(s)/deciliter  glucagon  Injectable 1 milliGRAM(s) IntraMuscular once PRN Glucose LESS THAN 70 milligrams/deciliter  morphine  - Injectable 2 milliGRAM(s) IV Push every 4 hours PRN Moderate Pain (4 - 6)      Allergies    SULFA (Unknown)  sulfa drugs (Unknown)    Intolerances        Review of Systems:    General:  No wt loss, fevers, chills, night sweats, fatigue   Eyes:  Good vision, no reported pain  ENT:  No sore throat, pain, runny nose, dysphagia  CV:  No pain, palpitations, hypo/hypertension  Resp:  No dyspnea, cough, tachypnea, wheezing  GI:  No pain, No nausea, No vomiting, No diarrhea, No constipation, No weight loss, No fever, No pruritis, No rectal bleeding, No melena, No dysphagia  :  No pain, bleeding, incontinence, nocturia  Muscle:  No pain, weakness  Neuro:  No weakness, tingling, memory problems  Psych:  No fatigue, insomnia, mood problems, depression  Endocrine:  No polyuria, polydypsia, cold/heat intolerance  Heme:  No petechiae, ecchymosis, easy bruisability  Skin:  No rash, tattoos, scars, edema      Vital Signs Last 24 Hrs  T(C): 36.9 (09 Oct 2019 10:47), Max: 36.9 (08 Oct 2019 14:14)  T(F): 98.4 (09 Oct 2019 10:47), Max: 98.5 (08 Oct 2019 14:14)  HR: 80 (09 Oct 2019 10:47) (80 - 95)  BP: 124/62 (09 Oct 2019 10:47) (123/63 - 146/68)  BP(mean): --  RR: 17 (09 Oct 2019 10:47) (16 - 18)  SpO2: 94% (09 Oct 2019 10:47) (94% - 100%)    PHYSICAL EXAM:    Constitutional: NAD  HEENT: EOMI, throat clear  Neck: No LAD, supple  Respiratory: CTA and P  Cardiovascular: S1 and S2, RRR, no M  Gastrointestinal: BS+, soft, NT/ND, neg HSM,  Extremities: No peripheral edema, neg clubbing, cyanosis  Vascular: 2+ peripheral pulses  Neurological: A/O x 3, no focal deficits  Psychiatric: Normal mood, normal affect  Skin: No rashes      LABS:                        10.0   5.40  )-----------( 279      ( 09 Oct 2019 05:20 )             31.3     10-09    136  |  103  |  9   ----------------------------<  129<H>  5.1   |  20<L>  |  0.38<L>    Ca    8.7      09 Oct 2019 05:20  Phos  2.8     10-09  Mg     1.7     10-09            RADIOLOGY & ADDITIONAL TESTS:

## 2019-10-09 NOTE — PROGRESS NOTE ADULT - SUBJECTIVE AND OBJECTIVE BOX
Morning Surgical Progress Note  Patient is a 78y old  Female who presents with a chief complaint of Rectal prolapse (08 Oct 2019 16:04)      SUBJECTIVE: Patient seen and examined at bedside with surgical team, patient without complaints. Denies nausea, + bowel movements and flatus    PAST MEDICAL & SURGICAL HISTORY:  Diastolic heart failure  Acute Ischemic Right KENY Stroke  History of Leukemia  H/O: Hypertension  Anemia  Lupus  Hypercholesteremia  Diabetes  Depression  Status post cataract extraction and insertion of intraocular lens, unspecified laterality  H/O: Hysterectomy    FAMILY HISTORY:  Family history of cardiomyopathy    REVIEW OF SYSTEMS:  CONSTITUTIONAL: No weakness, fevers or chills  EYES/ENT: No visual changes;  No vertigo or throat pain   NECK: No pain or stiffness  RESPIRATORY: No cough, wheezing, hemoptysis; No shortness of breath  CARDIOVASCULAR: No chest pain or palpitations  GASTROINTESTINAL: No abdominal or epigastric pain. No nausea, vomiting, or hematemesis; No diarrhea or constipation. No melena or hematochezia.  GENITOURINARY: No dysuria, frequency or hematuria  NEUROLOGICAL: No numbness or weakness  SKIN: No itching, rashes    Vital Signs Last 24 Hrs  T(C): 36.9 (09 Oct 2019 10:47), Max: 36.9 (08 Oct 2019 14:14)  T(F): 98.4 (09 Oct 2019 10:47), Max: 98.5 (08 Oct 2019 14:14)  HR: 80 (09 Oct 2019 10:47) (80 - 95)  BP: 124/62 (09 Oct 2019 10:47) (123/63 - 146/68)  BP(mean): --  RR: 17 (09 Oct 2019 10:47) (16 - 18)  SpO2: 94% (09 Oct 2019 10:47) (94% - 100%)I&O's Detail    08 Oct 2019 07:01  -  09 Oct 2019 07:00  --------------------------------------------------------  IN:    dextrose 5% + sodium chloride 0.9% with potassium chloride 20 mEq/L: 1650 mL    IV PiggyBack: 350 mL  Total IN: 2000 mL    OUT:    Nasoenteral Tube: 100 mL  Total OUT: 100 mL    Total NET: 1900 mL        Medications  MEDICATIONS  (STANDING):  amLODIPine   Tablet 5 milliGRAM(s) Oral daily  cholecalciferol 400 Unit(s) Oral daily  dextrose 5% + sodium chloride 0.9% with potassium chloride 20 mEq/L 1000 milliLiter(s) (75 mL/Hr) IV Continuous <Continuous>  dextrose 5%. 1000 milliLiter(s) (50 mL/Hr) IV Continuous <Continuous>  dextrose 50% Injectable 12.5 Gram(s) IV Push once  dextrose 50% Injectable 25 Gram(s) IV Push once  dextrose 50% Injectable 25 Gram(s) IV Push once  heparin  Injectable 5000 Unit(s) SubCutaneous every 8 hours  influenza   Vaccine 0.5 milliLiter(s) IntraMuscular once  insulin lispro (HumaLOG) corrective regimen sliding scale   SubCutaneous three times a day before meals  losartan 100 milliGRAM(s) Oral daily  magnesium sulfate  IVPB 2 Gram(s) IV Intermittent once  pantoprazole    Tablet 40 milliGRAM(s) Oral before breakfast  polyethylene glycol 3350 17 Gram(s) Oral daily  psyllium Powder 1 Packet(s) Oral daily    MEDICATIONS  (PRN):  artificial  tears Solution 1 Drop(s) Both EYES three times a day PRN Dry Eyes  dextrose 40% Gel 15 Gram(s) Oral once PRN Blood Glucose LESS THAN 70 milliGRAM(s)/deciliter  glucagon  Injectable 1 milliGRAM(s) IntraMuscular once PRN Glucose LESS THAN 70 milligrams/deciliter  morphine  - Injectable 2 milliGRAM(s) IV Push every 4 hours PRN Moderate Pain (4 - 6)      Physical Exam  Constitutional: A&O, NAD, thin  Eyes: Scleras clear, PERRLA/ EOMI, Gross vision intact  Gastrointestinal: Soft nontender, nondistended  Extremities: Moving all extremities, no edema    LABS:                        10.0   5.40  )-----------( 279      ( 09 Oct 2019 05:20 )             31.3     10-09    136  |  103  |  9   ----------------------------<  129<H>  5.1   |  20<L>  |  0.38<L>    Ca    8.7      09 Oct 2019 05:20  Phos  2.8     10-09  Mg     1.7     10-09

## 2019-10-09 NOTE — PROGRESS NOTE ADULT - SUBJECTIVE AND OBJECTIVE BOX
Patient is a 78y old  Female who presents with a chief complaint of Rectal prolapse (09 Oct 2019 13:14)      SUBJECTIVE / OVERNIGHT EVENTS: overnight events noted    ROS:  Resp: No cough no sputum production  CVS: No chest pain no palpitations no orthopnea  GI: no N/V/D  : no dysuria, no hematuria  Neuro: no weakness no paresthesias  Heme: No petechiae no easy bruising  Msk: No joint pain no swelling  Skin: No rash no itching        MEDICATIONS  (STANDING):  amLODIPine   Tablet 5 milliGRAM(s) Oral daily  cholecalciferol 400 Unit(s) Oral daily  dextrose 5% + sodium chloride 0.9% with potassium chloride 20 mEq/L 1000 milliLiter(s) (75 mL/Hr) IV Continuous <Continuous>  dextrose 5%. 1000 milliLiter(s) (50 mL/Hr) IV Continuous <Continuous>  dextrose 50% Injectable 12.5 Gram(s) IV Push once  dextrose 50% Injectable 25 Gram(s) IV Push once  dextrose 50% Injectable 25 Gram(s) IV Push once  heparin  Injectable 5000 Unit(s) SubCutaneous every 8 hours  influenza   Vaccine 0.5 milliLiter(s) IntraMuscular once  insulin lispro (HumaLOG) corrective regimen sliding scale   SubCutaneous three times a day before meals  losartan 100 milliGRAM(s) Oral daily  pantoprazole    Tablet 40 milliGRAM(s) Oral before breakfast  polyethylene glycol 3350 17 Gram(s) Oral daily  psyllium Powder 1 Packet(s) Oral daily    MEDICATIONS  (PRN):  artificial  tears Solution 1 Drop(s) Both EYES three times a day PRN Dry Eyes  dextrose 40% Gel 15 Gram(s) Oral once PRN Blood Glucose LESS THAN 70 milliGRAM(s)/deciliter  glucagon  Injectable 1 milliGRAM(s) IntraMuscular once PRN Glucose LESS THAN 70 milligrams/deciliter  morphine  - Injectable 2 milliGRAM(s) IV Push every 4 hours PRN Moderate Pain (4 - 6)        CAPILLARY BLOOD GLUCOSE      POCT Blood Glucose.: 161 mg/dL (09 Oct 2019 17:48)  POCT Blood Glucose.: 155 mg/dL (09 Oct 2019 12:38)  POCT Blood Glucose.: 136 mg/dL (09 Oct 2019 08:57)  POCT Blood Glucose.: 116 mg/dL (09 Oct 2019 05:32)  POCT Blood Glucose.: 113 mg/dL (08 Oct 2019 23:36)    I&O's Summary    08 Oct 2019 07:01  -  09 Oct 2019 07:00  --------------------------------------------------------  IN: 2000 mL / OUT: 100 mL / NET: 1900 mL    09 Oct 2019 07:01  -  09 Oct 2019 18:26  --------------------------------------------------------  IN: 1080 mL / OUT: 0 mL / NET: 1080 mL        Vital Signs Last 24 Hrs  T(C): 36.8 (09 Oct 2019 18:09), Max: 36.9 (09 Oct 2019 10:47)  T(F): 98.3 (09 Oct 2019 18:09), Max: 98.4 (09 Oct 2019 10:47)  HR: 63 (09 Oct 2019 18:09) (63 - 95)  BP: 143/51 (09 Oct 2019 18:09) (124/62 - 146/68)  BP(mean): --  RR: 18 (09 Oct 2019 18:09) (16 - 18)  SpO2: 98% (09 Oct 2019 18:09) (94% - 100%)    PHYSICAL EXAM:  GENERAL: NAD, cachectic  HEAD:  Atraumatic, Normocephalic  EYES: EOMI, PERRLA, conjunctiva and sclera clear  NECK: Supple, No JVD  CHEST/LUNG: Clear to auscultation bilaterally; No wheeze  HEART: S1S2; No rubs, or gallops, no murmurs  ABDOMEN: Soft, Nontender, Nondistended; Bowel sounds present  EXTREMITIES:  + Peripheral Pulses, No clubbing or cyanosis, no edema  NEUROLOGY: mild left mild residual hemiparesis  SKIN: No rashes or lesions    LABS:                        10.0   5.40  )-----------( 279      ( 09 Oct 2019 05:20 )             31.3     10-09    136  |  103  |  9   ----------------------------<  129<H>  5.1   |  20<L>  |  0.38<L>    Ca    8.7      09 Oct 2019 05:20  Phos  2.8     10-09  Mg     1.7     10-09                  All consultant(s) notes reviewed and care discussed with other providers        Contact Number, Dr Bishop [phone]

## 2019-10-10 LAB
ANION GAP SERPL CALC-SCNC: 8 MMO/L — SIGNIFICANT CHANGE UP (ref 7–14)
BUN SERPL-MCNC: 13 MG/DL — SIGNIFICANT CHANGE UP (ref 7–23)
CALCIUM SERPL-MCNC: 8.3 MG/DL — LOW (ref 8.4–10.5)
CHLORIDE SERPL-SCNC: 104 MMOL/L — SIGNIFICANT CHANGE UP (ref 98–107)
CO2 SERPL-SCNC: 21 MMOL/L — LOW (ref 22–31)
CREAT SERPL-MCNC: 0.41 MG/DL — LOW (ref 0.5–1.3)
GLUCOSE BLDC GLUCOMTR-MCNC: 118 MG/DL — HIGH (ref 70–99)
GLUCOSE BLDC GLUCOMTR-MCNC: 134 MG/DL — HIGH (ref 70–99)
GLUCOSE BLDC GLUCOMTR-MCNC: 176 MG/DL — HIGH (ref 70–99)
GLUCOSE BLDC GLUCOMTR-MCNC: 183 MG/DL — HIGH (ref 70–99)
GLUCOSE SERPL-MCNC: 156 MG/DL — HIGH (ref 70–99)
HCT VFR BLD CALC: 31.3 % — LOW (ref 34.5–45)
HGB BLD-MCNC: 9.6 G/DL — LOW (ref 11.5–15.5)
MAGNESIUM SERPL-MCNC: 1.6 MG/DL — SIGNIFICANT CHANGE UP (ref 1.6–2.6)
MCHC RBC-ENTMCNC: 26.5 PG — LOW (ref 27–34)
MCHC RBC-ENTMCNC: 30.7 % — LOW (ref 32–36)
MCV RBC AUTO: 86.5 FL — SIGNIFICANT CHANGE UP (ref 80–100)
NRBC # FLD: 0 K/UL — SIGNIFICANT CHANGE UP (ref 0–0)
PHOSPHATE SERPL-MCNC: 1.8 MG/DL — LOW (ref 2.5–4.5)
PLATELET # BLD AUTO: 297 K/UL — SIGNIFICANT CHANGE UP (ref 150–400)
PMV BLD: 11.4 FL — SIGNIFICANT CHANGE UP (ref 7–13)
POTASSIUM SERPL-MCNC: 4.2 MMOL/L — SIGNIFICANT CHANGE UP (ref 3.5–5.3)
POTASSIUM SERPL-SCNC: 4.2 MMOL/L — SIGNIFICANT CHANGE UP (ref 3.5–5.3)
RBC # BLD: 3.62 M/UL — LOW (ref 3.8–5.2)
RBC # FLD: 13.8 % — SIGNIFICANT CHANGE UP (ref 10.3–14.5)
SODIUM SERPL-SCNC: 133 MMOL/L — LOW (ref 135–145)
WBC # BLD: 6.94 K/UL — SIGNIFICANT CHANGE UP (ref 3.8–10.5)
WBC # FLD AUTO: 6.94 K/UL — SIGNIFICANT CHANGE UP (ref 3.8–10.5)

## 2019-10-10 RX ORDER — MAGNESIUM SULFATE 500 MG/ML
2 VIAL (ML) INJECTION ONCE
Refills: 0 | Status: COMPLETED | OUTPATIENT
Start: 2019-10-10 | End: 2019-10-10

## 2019-10-10 RX ORDER — NEBIVOLOL HYDROCHLORIDE 5 MG/1
10 TABLET ORAL DAILY
Refills: 0 | Status: DISCONTINUED | OUTPATIENT
Start: 2019-10-10 | End: 2019-10-14

## 2019-10-10 RX ORDER — BUDESONIDE AND FORMOTEROL FUMARATE DIHYDRATE 160; 4.5 UG/1; UG/1
2 AEROSOL RESPIRATORY (INHALATION)
Refills: 0 | Status: DISCONTINUED | OUTPATIENT
Start: 2019-10-10 | End: 2019-10-14

## 2019-10-10 RX ADMIN — HEPARIN SODIUM 5000 UNIT(S): 5000 INJECTION INTRAVENOUS; SUBCUTANEOUS at 06:46

## 2019-10-10 RX ADMIN — HEPARIN SODIUM 5000 UNIT(S): 5000 INJECTION INTRAVENOUS; SUBCUTANEOUS at 14:59

## 2019-10-10 RX ADMIN — PANTOPRAZOLE SODIUM 40 MILLIGRAM(S): 20 TABLET, DELAYED RELEASE ORAL at 06:46

## 2019-10-10 RX ADMIN — Medication 85 MILLIMOLE(S): at 11:16

## 2019-10-10 RX ADMIN — Medication 1: at 09:08

## 2019-10-10 RX ADMIN — NEBIVOLOL HYDROCHLORIDE 10 MILLIGRAM(S): 5 TABLET ORAL at 11:17

## 2019-10-10 RX ADMIN — Medication 1: at 12:54

## 2019-10-10 RX ADMIN — AMLODIPINE BESYLATE 5 MILLIGRAM(S): 2.5 TABLET ORAL at 06:46

## 2019-10-10 RX ADMIN — LOSARTAN POTASSIUM 100 MILLIGRAM(S): 100 TABLET, FILM COATED ORAL at 06:46

## 2019-10-10 RX ADMIN — HEPARIN SODIUM 5000 UNIT(S): 5000 INJECTION INTRAVENOUS; SUBCUTANEOUS at 21:41

## 2019-10-10 RX ADMIN — Medication 1 PACKET(S): at 11:17

## 2019-10-10 RX ADMIN — BUDESONIDE AND FORMOTEROL FUMARATE DIHYDRATE 2 PUFF(S): 160; 4.5 AEROSOL RESPIRATORY (INHALATION) at 11:16

## 2019-10-10 RX ADMIN — Medication 50 GRAM(S): at 11:16

## 2019-10-10 RX ADMIN — Medication 400 UNIT(S): at 11:17

## 2019-10-10 NOTE — PROGRESS NOTE ADULT - SUBJECTIVE AND OBJECTIVE BOX
INTERVAL HPI/OVERNIGHT EVENTS:    having gi function   denied abdominal pain   no n/v    MEDICATIONS  (STANDING):  acetaminophen   Tablet .. 650 milliGRAM(s) Oral every 6 hours  amLODIPine   Tablet 5 milliGRAM(s) Oral daily  buDESOnide  80 MICROgram(s)/formoterol 4.5 MICROgram(s) Inhaler 2 Puff(s) Inhalation two times a day  cholecalciferol 400 Unit(s) Oral daily  dextrose 5%. 1000 milliLiter(s) (50 mL/Hr) IV Continuous <Continuous>  dextrose 5%. 1000 milliLiter(s) (50 mL/Hr) IV Continuous <Continuous>  dextrose 50% Injectable 12.5 Gram(s) IV Push once  dextrose 50% Injectable 25 Gram(s) IV Push once  dextrose 50% Injectable 25 Gram(s) IV Push once  dextrose 50% Injectable 12.5 Gram(s) IV Push once  dextrose 50% Injectable 25 Gram(s) IV Push once  dextrose 50% Injectable 25 Gram(s) IV Push once  heparin  Injectable 5000 Unit(s) SubCutaneous every 8 hours  influenza   Vaccine 0.5 milliLiter(s) IntraMuscular once  insulin lispro (HumaLOG) corrective regimen sliding scale   SubCutaneous at bedtime  insulin lispro (HumaLOG) corrective regimen sliding scale   SubCutaneous three times a day before meals  losartan 100 milliGRAM(s) Oral daily  nebivolol 10 milliGRAM(s) Oral daily  pantoprazole    Tablet 40 milliGRAM(s) Oral before breakfast  polyethylene glycol 3350 17 Gram(s) Oral daily  psyllium Powder 1 Packet(s) Oral daily    MEDICATIONS  (PRN):  artificial  tears Solution 1 Drop(s) Both EYES three times a day PRN Dry Eyes  dextrose 40% Gel 15 Gram(s) Oral once PRN Blood Glucose LESS THAN 70 milliGRAM(s)/deciliter  dextrose 40% Gel 15 Gram(s) Oral once PRN Blood Glucose LESS THAN 70 milliGRAM(s)/deciliter  glucagon  Injectable 1 milliGRAM(s) IntraMuscular once PRN Glucose LESS THAN 70 milligrams/deciliter  glucagon  Injectable 1 milliGRAM(s) IntraMuscular once PRN Glucose LESS THAN 70 milligrams/deciliter      Allergies    SULFA (Unknown)  sulfa drugs (Unknown)    Intolerances        Review of Systems:    General:  No wt loss, fevers, chills, night sweats, fatigue   Eyes:  Good vision, no reported pain  ENT:  No sore throat, pain, runny nose, dysphagia  CV:  No pain, palpitations, hypo/hypertension  Resp:  No dyspnea, cough, tachypnea, wheezing  GI:  No pain, No nausea, No vomiting, No diarrhea, No constipation, No weight loss, No fever, No pruritis, No rectal bleeding, No melena, No dysphagia  :  No pain, bleeding, incontinence, nocturia  Muscle:  No pain, weakness  Neuro:  No weakness, tingling, memory problems  Psych:  No fatigue, insomnia, mood problems, depression  Endocrine:  No polyuria, polydypsia, cold/heat intolerance  Heme:  No petechiae, ecchymosis, easy bruisability  Skin:  No rash, tattoos, scars, edema      Vital Signs Last 24 Hrs  T(C): 37.4 (10 Oct 2019 10:22), Max: 37.4 (10 Oct 2019 10:22)  T(F): 99.4 (10 Oct 2019 10:22), Max: 99.4 (10 Oct 2019 10:22)  HR: 111 (10 Oct 2019 10:22) (51 - 111)  BP: 135/83 (10 Oct 2019 10:22) (131/53 - 153/77)  BP(mean): --  RR: 20 (10 Oct 2019 10:22) (16 - 20)  SpO2: 99% (10 Oct 2019 10:22) (98% - 100%)    PHYSICAL EXAM:    Constitutional: NAD  HEENT: EOMI, throat clear  Neck: No LAD, supple  Respiratory: CTA and P  Cardiovascular: S1 and S2, RRR, no M  Gastrointestinal: BS+, soft, NT/ND, neg HSM,  Extremities: No peripheral edema, neg clubbing, cyanosis  Vascular: 2+ peripheral pulses  Neurological: A/O x 3, no focal deficits  Psychiatric: Normal mood, normal affect  Skin: No rashes      LABS:                        9.6    6.94  )-----------( 297      ( 10 Oct 2019 05:30 )             31.3     10-10    133<L>  |  104  |  13  ----------------------------<  156<H>  4.2   |  21<L>  |  0.41<L>    Ca    8.3<L>      10 Oct 2019 05:30  Phos  1.8     10-10  Mg     1.6     10-10            RADIOLOGY & ADDITIONAL TESTS:

## 2019-10-10 NOTE — PROGRESS NOTE ADULT - ASSESSMENT
78F s/p open rectopexy on 10/4/19 recovering appropriately advancing to LRD today    Plan:   - LRD  - PT: rehab  - f/u GI fx   - OOB as tolerated  - IVL    A-Team

## 2019-10-10 NOTE — PROGRESS NOTE ADULT - SUBJECTIVE AND OBJECTIVE BOX
Morning Surgical Progress Note  Patient is a 78y old  Female who presents with a chief complaint of Rectal prolapse       SUBJECTIVE: Patient seen and examined at bedside this AM. patient without complaints. Denies nausea, +BM / +flatus      Objective:  Vital Signs Last 24 Hrs  T(C): 37 (11 Oct 2019 02:08), Max: 37.4 (10 Oct 2019 10:22)  T(F): 98.6 (11 Oct 2019 02:08), Max: 99.4 (10 Oct 2019 10:22)  HR: 71 (11 Oct 2019 02:08) (71 - 111)  BP: 132/63 (11 Oct 2019 02:08) (122/60 - 153/77)  BP(mean): --  RR: 18 (11 Oct 2019 02:08) (18 - 20)  SpO2: 99% (11 Oct 2019 02:08) (97% - 100%)    I&O's Detail    09 Oct 2019 07:01  -  10 Oct 2019 07:00  --------------------------------------------------------  IN:    dextrose 5% + sodium chloride 0.9% with potassium chloride 20 mEq/L: 1800 mL    Oral Fluid: 920 mL  Total IN: 2720 mL    OUT:  Total OUT: 0 mL    Total NET: 2720 mL      10 Oct 2019 07:01  -  11 Oct 2019 04:02  --------------------------------------------------------  IN:    IV PiggyBack: 300 mL    Oral Fluid: 720 mL  Total IN: 1020 mL    OUT:  Total OUT: 0 mL    Total NET: 1020 mL          MEDICATIONS  (STANDING):  acetaminophen   Tablet .. 650 milliGRAM(s) Oral every 6 hours  amLODIPine   Tablet 5 milliGRAM(s) Oral daily  buDESOnide  80 MICROgram(s)/formoterol 4.5 MICROgram(s) Inhaler 2 Puff(s) Inhalation two times a day  cholecalciferol 400 Unit(s) Oral daily  dextrose 5%. 1000 milliLiter(s) (50 mL/Hr) IV Continuous <Continuous>  dextrose 5%. 1000 milliLiter(s) (50 mL/Hr) IV Continuous <Continuous>  dextrose 50% Injectable 12.5 Gram(s) IV Push once  dextrose 50% Injectable 25 Gram(s) IV Push once  dextrose 50% Injectable 25 Gram(s) IV Push once  dextrose 50% Injectable 12.5 Gram(s) IV Push once  dextrose 50% Injectable 25 Gram(s) IV Push once  dextrose 50% Injectable 25 Gram(s) IV Push once  heparin  Injectable 5000 Unit(s) SubCutaneous every 8 hours  influenza   Vaccine 0.5 milliLiter(s) IntraMuscular once  insulin lispro (HumaLOG) corrective regimen sliding scale   SubCutaneous at bedtime  insulin lispro (HumaLOG) corrective regimen sliding scale   SubCutaneous three times a day before meals  losartan 100 milliGRAM(s) Oral daily  nebivolol 10 milliGRAM(s) Oral daily  pantoprazole    Tablet 40 milliGRAM(s) Oral before breakfast  polyethylene glycol 3350 17 Gram(s) Oral daily  psyllium Powder 1 Packet(s) Oral daily    MEDICATIONS  (PRN):  artificial  tears Solution 1 Drop(s) Both EYES three times a day PRN Dry Eyes  dextrose 40% Gel 15 Gram(s) Oral once PRN Blood Glucose LESS THAN 70 milliGRAM(s)/deciliter  dextrose 40% Gel 15 Gram(s) Oral once PRN Blood Glucose LESS THAN 70 milliGRAM(s)/deciliter  glucagon  Injectable 1 milliGRAM(s) IntraMuscular once PRN Glucose LESS THAN 70 milligrams/deciliter  glucagon  Injectable 1 milliGRAM(s) IntraMuscular once PRN Glucose LESS THAN 70 milligrams/deciliter                            9.6    6.94  )-----------( 297      ( 10 Oct 2019 05:30 )             31.3       10-10    133<L>  |  104  |  13  ----------------------------<  156<H>  4.2   |  21<L>  |  0.41<L>    Ca    8.3<L>      10 Oct 2019 05:30  Phos  1.8     10-10  Mg     1.6     10-10          Physical Exam  Constitutional: A&O, NAD, thin  Eyes: Scleras clear, PERRLA/ EOMI, Gross vision intact  Gastrointestinal: Soft nontender, nondistended  Extremities: Moving all extremities, no edema

## 2019-10-10 NOTE — PROGRESS NOTE ADULT - SUBJECTIVE AND OBJECTIVE BOX
Patient is a 78y old  Female who presents with a chief complaint of Rectal prolapse (10 Oct 2019 11:54)      SUBJECTIVE / OVERNIGHT EVENTS: overnight events noted    ROS:  Resp: No cough no sputum production  CVS: No chest pain no palpitations no orthopnea  GI: no N/V/D  : no dysuria, no hematuria  Neuro: no weakness no paresthesias  Heme: No petechiae no easy bruising  Msk: No joint pain no swelling  Skin: No rash no itching        MEDICATIONS  (STANDING):  acetaminophen   Tablet .. 650 milliGRAM(s) Oral every 6 hours  amLODIPine   Tablet 5 milliGRAM(s) Oral daily  buDESOnide  80 MICROgram(s)/formoterol 4.5 MICROgram(s) Inhaler 2 Puff(s) Inhalation two times a day  cholecalciferol 400 Unit(s) Oral daily  dextrose 5%. 1000 milliLiter(s) (50 mL/Hr) IV Continuous <Continuous>  dextrose 5%. 1000 milliLiter(s) (50 mL/Hr) IV Continuous <Continuous>  dextrose 50% Injectable 12.5 Gram(s) IV Push once  dextrose 50% Injectable 25 Gram(s) IV Push once  dextrose 50% Injectable 25 Gram(s) IV Push once  dextrose 50% Injectable 12.5 Gram(s) IV Push once  dextrose 50% Injectable 25 Gram(s) IV Push once  dextrose 50% Injectable 25 Gram(s) IV Push once  heparin  Injectable 5000 Unit(s) SubCutaneous every 8 hours  influenza   Vaccine 0.5 milliLiter(s) IntraMuscular once  insulin lispro (HumaLOG) corrective regimen sliding scale   SubCutaneous at bedtime  insulin lispro (HumaLOG) corrective regimen sliding scale   SubCutaneous three times a day before meals  losartan 100 milliGRAM(s) Oral daily  nebivolol 10 milliGRAM(s) Oral daily  pantoprazole    Tablet 40 milliGRAM(s) Oral before breakfast  polyethylene glycol 3350 17 Gram(s) Oral daily  psyllium Powder 1 Packet(s) Oral daily    MEDICATIONS  (PRN):  artificial  tears Solution 1 Drop(s) Both EYES three times a day PRN Dry Eyes  dextrose 40% Gel 15 Gram(s) Oral once PRN Blood Glucose LESS THAN 70 milliGRAM(s)/deciliter  dextrose 40% Gel 15 Gram(s) Oral once PRN Blood Glucose LESS THAN 70 milliGRAM(s)/deciliter  glucagon  Injectable 1 milliGRAM(s) IntraMuscular once PRN Glucose LESS THAN 70 milligrams/deciliter  glucagon  Injectable 1 milliGRAM(s) IntraMuscular once PRN Glucose LESS THAN 70 milligrams/deciliter        CAPILLARY BLOOD GLUCOSE      POCT Blood Glucose.: 183 mg/dL (10 Oct 2019 12:35)  POCT Blood Glucose.: 176 mg/dL (10 Oct 2019 08:49)  POCT Blood Glucose.: 124 mg/dL (09 Oct 2019 22:24)  POCT Blood Glucose.: 161 mg/dL (09 Oct 2019 17:48)    I&O's Summary    09 Oct 2019 07:01  -  10 Oct 2019 07:00  --------------------------------------------------------  IN: 2720 mL / OUT: 0 mL / NET: 2720 mL        Vital Signs Last 24 Hrs  T(C): 37.4 (10 Oct 2019 10:22), Max: 37.4 (10 Oct 2019 10:22)  T(F): 99.4 (10 Oct 2019 10:22), Max: 99.4 (10 Oct 2019 10:22)  HR: 111 (10 Oct 2019 10:22) (51 - 111)  BP: 135/83 (10 Oct 2019 10:22) (131/53 - 153/77)  BP(mean): --  RR: 20 (10 Oct 2019 10:22) (18 - 20)  SpO2: 99% (10 Oct 2019 10:22) (98% - 100%)    PHYSICAL EXAM:  GENERAL: NAD, cachectic  HEAD:  Atraumatic, Normocephalic  EYES: EOMI, PERRLA, conjunctiva and sclera clear  NECK: Supple, No JVD  CHEST/LUNG: Clear to auscultation bilaterally; No wheeze  HEART: S1S2; No rubs, or gallops, no murmurs  ABDOMEN: Soft, Nontender, Nondistended; Bowel sounds present  EXTREMITIES:  + Peripheral Pulses, No clubbing or cyanosis, no edema  NEUROLOGY: mild left mild residual hemiparesis  SKIN: No rashes or lesions    LABS:                        9.6    6.94  )-----------( 297      ( 10 Oct 2019 05:30 )             31.3     10-10    133<L>  |  104  |  13  ----------------------------<  156<H>  4.2   |  21<L>  |  0.41<L>    Ca    8.3<L>      10 Oct 2019 05:30  Phos  1.8     10-10  Mg     1.6     10-10                  All consultant(s) notes reviewed and care discussed with other providers        Contact Number, Dr Bishop [phone]

## 2019-10-11 ENCOUNTER — TRANSCRIPTION ENCOUNTER (OUTPATIENT)
Age: 79
End: 2019-10-11

## 2019-10-11 LAB
ANION GAP SERPL CALC-SCNC: 11 MMO/L — SIGNIFICANT CHANGE UP (ref 7–14)
ANION GAP SERPL CALC-SCNC: 12 MMO/L — SIGNIFICANT CHANGE UP (ref 7–14)
BLD GP AB SCN SERPL QL: POSITIVE — SIGNIFICANT CHANGE UP
BUN SERPL-MCNC: 10 MG/DL — SIGNIFICANT CHANGE UP (ref 7–23)
BUN SERPL-MCNC: 11 MG/DL — SIGNIFICANT CHANGE UP (ref 7–23)
CALCIUM SERPL-MCNC: 8.2 MG/DL — LOW (ref 8.4–10.5)
CALCIUM SERPL-MCNC: 8.4 MG/DL — SIGNIFICANT CHANGE UP (ref 8.4–10.5)
CHLORIDE SERPL-SCNC: 102 MMOL/L — SIGNIFICANT CHANGE UP (ref 98–107)
CHLORIDE SERPL-SCNC: 98 MMOL/L — SIGNIFICANT CHANGE UP (ref 98–107)
CO2 SERPL-SCNC: 20 MMOL/L — LOW (ref 22–31)
CO2 SERPL-SCNC: 20 MMOL/L — LOW (ref 22–31)
CREAT SERPL-MCNC: 0.42 MG/DL — LOW (ref 0.5–1.3)
CREAT SERPL-MCNC: 0.44 MG/DL — LOW (ref 0.5–1.3)
DAT POLY-SP REAG RBC QL: NEGATIVE — SIGNIFICANT CHANGE UP
GLUCOSE BLDC GLUCOMTR-MCNC: 101 MG/DL — HIGH (ref 70–99)
GLUCOSE BLDC GLUCOMTR-MCNC: 127 MG/DL — HIGH (ref 70–99)
GLUCOSE BLDC GLUCOMTR-MCNC: 192 MG/DL — HIGH (ref 70–99)
GLUCOSE BLDC GLUCOMTR-MCNC: 271 MG/DL — HIGH (ref 70–99)
GLUCOSE BLDC GLUCOMTR-MCNC: 67 MG/DL — LOW (ref 70–99)
GLUCOSE BLDC GLUCOMTR-MCNC: 73 MG/DL — SIGNIFICANT CHANGE UP (ref 70–99)
GLUCOSE BLDC GLUCOMTR-MCNC: 88 MG/DL — SIGNIFICANT CHANGE UP (ref 70–99)
GLUCOSE BLDC GLUCOMTR-MCNC: 92 MG/DL — SIGNIFICANT CHANGE UP (ref 70–99)
GLUCOSE BLDC GLUCOMTR-MCNC: 94 MG/DL — SIGNIFICANT CHANGE UP (ref 70–99)
GLUCOSE SERPL-MCNC: 169 MG/DL — HIGH (ref 70–99)
GLUCOSE SERPL-MCNC: 86 MG/DL — SIGNIFICANT CHANGE UP (ref 70–99)
HCT VFR BLD CALC: 27 % — LOW (ref 34.5–45)
HCT VFR BLD CALC: 30.5 % — LOW (ref 34.5–45)
HGB BLD-MCNC: 8.7 G/DL — LOW (ref 11.5–15.5)
HGB BLD-MCNC: 9.8 G/DL — LOW (ref 11.5–15.5)
MAGNESIUM SERPL-MCNC: 1.5 MG/DL — LOW (ref 1.6–2.6)
MCHC RBC-ENTMCNC: 26.8 PG — LOW (ref 27–34)
MCHC RBC-ENTMCNC: 27 PG — SIGNIFICANT CHANGE UP (ref 27–34)
MCHC RBC-ENTMCNC: 32.1 % — SIGNIFICANT CHANGE UP (ref 32–36)
MCHC RBC-ENTMCNC: 32.2 % — SIGNIFICANT CHANGE UP (ref 32–36)
MCV RBC AUTO: 83.1 FL — SIGNIFICANT CHANGE UP (ref 80–100)
MCV RBC AUTO: 84 FL — SIGNIFICANT CHANGE UP (ref 80–100)
NRBC # FLD: 0 K/UL — SIGNIFICANT CHANGE UP (ref 0–0)
NRBC # FLD: 0.05 K/UL — SIGNIFICANT CHANGE UP (ref 0–0)
PHOSPHATE SERPL-MCNC: 3.3 MG/DL — SIGNIFICANT CHANGE UP (ref 2.5–4.5)
PLATELET # BLD AUTO: 291 K/UL — SIGNIFICANT CHANGE UP (ref 150–400)
PLATELET # BLD AUTO: 352 K/UL — SIGNIFICANT CHANGE UP (ref 150–400)
PMV BLD: 10 FL — SIGNIFICANT CHANGE UP (ref 7–13)
PMV BLD: 10.7 FL — SIGNIFICANT CHANGE UP (ref 7–13)
POTASSIUM SERPL-MCNC: 3.8 MMOL/L — SIGNIFICANT CHANGE UP (ref 3.5–5.3)
POTASSIUM SERPL-MCNC: 4 MMOL/L — SIGNIFICANT CHANGE UP (ref 3.5–5.3)
POTASSIUM SERPL-SCNC: 3.8 MMOL/L — SIGNIFICANT CHANGE UP (ref 3.5–5.3)
POTASSIUM SERPL-SCNC: 4 MMOL/L — SIGNIFICANT CHANGE UP (ref 3.5–5.3)
RBC # BLD: 3.25 M/UL — LOW (ref 3.8–5.2)
RBC # BLD: 3.63 M/UL — LOW (ref 3.8–5.2)
RBC # FLD: 14 % — SIGNIFICANT CHANGE UP (ref 10.3–14.5)
RBC # FLD: 14.1 % — SIGNIFICANT CHANGE UP (ref 10.3–14.5)
RH IG SCN BLD-IMP: POSITIVE — SIGNIFICANT CHANGE UP
SODIUM SERPL-SCNC: 130 MMOL/L — LOW (ref 135–145)
SODIUM SERPL-SCNC: 133 MMOL/L — LOW (ref 135–145)
WBC # BLD: 5.87 K/UL — SIGNIFICANT CHANGE UP (ref 3.8–10.5)
WBC # BLD: 7.15 K/UL — SIGNIFICANT CHANGE UP (ref 3.8–10.5)
WBC # FLD AUTO: 5.87 K/UL — SIGNIFICANT CHANGE UP (ref 3.8–10.5)
WBC # FLD AUTO: 7.15 K/UL — SIGNIFICANT CHANGE UP (ref 3.8–10.5)

## 2019-10-11 PROCEDURE — 93010 ELECTROCARDIOGRAM REPORT: CPT

## 2019-10-11 RX ORDER — PSYLLIUM SEED (WITH DEXTROSE)
1 POWDER (GRAM) ORAL EVERY 24 HOURS
Refills: 0 | Status: DISCONTINUED | OUTPATIENT
Start: 2019-10-11 | End: 2019-10-14

## 2019-10-11 RX ORDER — POLYETHYLENE GLYCOL 3350 17 G/17G
17 POWDER, FOR SOLUTION ORAL
Refills: 0 | Status: DISCONTINUED | OUTPATIENT
Start: 2019-10-13 | End: 2019-10-14

## 2019-10-11 RX ORDER — POLYETHYLENE GLYCOL 3350 17 G/17G
17 POWDER, FOR SOLUTION ORAL
Qty: 0 | Refills: 0 | DISCHARGE
Start: 2019-10-11

## 2019-10-11 RX ORDER — PSYLLIUM SEED (WITH DEXTROSE)
3.4 POWDER (GRAM) ORAL
Qty: 0 | Refills: 0 | DISCHARGE
Start: 2019-10-11

## 2019-10-11 RX ORDER — DEXTROSE 50 % IN WATER 50 %
15 SYRINGE (ML) INTRAVENOUS ONCE
Refills: 0 | Status: COMPLETED | OUTPATIENT
Start: 2019-10-11 | End: 2019-10-11

## 2019-10-11 RX ORDER — CHOLECALCIFEROL (VITAMIN D3) 125 MCG
400 CAPSULE ORAL
Qty: 0 | Refills: 0 | DISCHARGE
Start: 2019-10-11

## 2019-10-11 RX ORDER — MAGNESIUM SULFATE 500 MG/ML
2 VIAL (ML) INJECTION ONCE
Refills: 0 | Status: COMPLETED | OUTPATIENT
Start: 2019-10-11 | End: 2019-10-11

## 2019-10-11 RX ORDER — LOSARTAN POTASSIUM 100 MG/1
1 TABLET, FILM COATED ORAL
Qty: 0 | Refills: 0 | DISCHARGE
Start: 2019-10-11

## 2019-10-11 RX ORDER — ACETAMINOPHEN 500 MG
2 TABLET ORAL
Qty: 0 | Refills: 0 | DISCHARGE
Start: 2019-10-11

## 2019-10-11 RX ORDER — AMLODIPINE BESYLATE 2.5 MG/1
1 TABLET ORAL
Qty: 0 | Refills: 0 | DISCHARGE
Start: 2019-10-11

## 2019-10-11 RX ADMIN — Medication 1: at 12:57

## 2019-10-11 RX ADMIN — AMLODIPINE BESYLATE 5 MILLIGRAM(S): 2.5 TABLET ORAL at 06:10

## 2019-10-11 RX ADMIN — LOSARTAN POTASSIUM 100 MILLIGRAM(S): 100 TABLET, FILM COATED ORAL at 06:10

## 2019-10-11 RX ADMIN — BUDESONIDE AND FORMOTEROL FUMARATE DIHYDRATE 2 PUFF(S): 160; 4.5 AEROSOL RESPIRATORY (INHALATION) at 22:28

## 2019-10-11 RX ADMIN — Medication 400 UNIT(S): at 12:31

## 2019-10-11 RX ADMIN — HEPARIN SODIUM 5000 UNIT(S): 5000 INJECTION INTRAVENOUS; SUBCUTANEOUS at 06:10

## 2019-10-11 RX ADMIN — NEBIVOLOL HYDROCHLORIDE 10 MILLIGRAM(S): 5 TABLET ORAL at 06:10

## 2019-10-11 RX ADMIN — Medication 15 GRAM(S): at 09:51

## 2019-10-11 RX ADMIN — BUDESONIDE AND FORMOTEROL FUMARATE DIHYDRATE 2 PUFF(S): 160; 4.5 AEROSOL RESPIRATORY (INHALATION) at 16:20

## 2019-10-11 RX ADMIN — PANTOPRAZOLE SODIUM 40 MILLIGRAM(S): 20 TABLET, DELAYED RELEASE ORAL at 06:10

## 2019-10-11 RX ADMIN — HEPARIN SODIUM 5000 UNIT(S): 5000 INJECTION INTRAVENOUS; SUBCUTANEOUS at 16:20

## 2019-10-11 RX ADMIN — BUDESONIDE AND FORMOTEROL FUMARATE DIHYDRATE 2 PUFF(S): 160; 4.5 AEROSOL RESPIRATORY (INHALATION) at 06:10

## 2019-10-11 RX ADMIN — Medication 50 GRAM(S): at 12:31

## 2019-10-11 RX ADMIN — HEPARIN SODIUM 5000 UNIT(S): 5000 INJECTION INTRAVENOUS; SUBCUTANEOUS at 22:28

## 2019-10-11 NOTE — PROGRESS NOTE ADULT - ASSESSMENT
78F s/p open rectopexy on 10/4/19 recovering appropriately advancing to clears today    Plan:   - Advance to clears  - PT: rehab  - f/u GI fx   - OOB as tolerated  - Seen and discussed with a team 78F s/p open rectopexy on 10/4/19 recovering appropriately advancing to clears today    Plan:   - Advance to clears  - PT: rehab  - f/u GI fx   - OOB as tolerated    A Team Surgery  [id number] 78F s/p open rectopexy on 10/4/19 recovering appropriately advancing to clears today    Plan:   - Advance to clears  - PT: rehab  - f/u GI fx   - OOB as tolerated  - Repleted Mg for hypomagnesemia on labs    A Team Surgery  [id number]

## 2019-10-11 NOTE — PROVIDER CONTACT NOTE (OTHER) - ACTION/TREATMENT ORDERED:
hypoglycemic protocol, gave one apple juice glucose 92, one more apple juice 88, administered glutose hypoglycemic protocol, gave one apple juice glucose 92, one more apple juice 88, administered glutose 271, rechecked to be 127, glucose before lunch 192

## 2019-10-11 NOTE — CHART NOTE - NSCHARTNOTEFT_GEN_A_CORE
Source: Patient [X ] confused, disoriented    Family [ ]     other [ X] electronic chart, RN     Diet : Diet, Pureed (10-10-19 @ 16:29)      Patient seen for severe malnutrition follow-up. Patient continues with good appetite and PO intakes at this time per RN. Also, consuming PO supplement Ensure Enlive though current diet order does not include po supplement. Recommend addition of po supplement Ensure Enlive 240mls 3x daily (1050kcal, 60g protein) for optimal nutrition. No nausea/vomiting/diarrhea/constipation or difficulty chewing and swallowing. Good PO intake encouraged.     Current Weight: Weight (kg): 43 (10-04 @ 16:40)      Pertinent Medications: acetaminophen   Tablet ..  amLODIPine   Tablet  buDESOnide  80 MICROgram(s)/formoterol 4.5 MICROgram(s) Inhaler  cholecalciferol  dextrose 5%.  dextrose 5%.  dextrose 50% Injectable  dextrose 50% Injectable  dextrose 50% Injectable  dextrose 50% Injectable  dextrose 50% Injectable  dextrose 50% Injectable  insulin lispro (HumaLOG) corrective regimen sliding scale  insulin lispro (HumaLOG) corrective regimen sliding scale  losartan  nebivolol  pantoprazole    Tablet  psyllium Powder    Pertinent Labs:  10-11 Na130 mmol/L<L> Glu 169 mg/dL<H> K+ 4.0 mmol/L Cr  0.42 mg/dL<L> BUN 10 mg/dL 10-11 Phos 3.3 mg/dL 09-28 [id number] 6.0 %<H>    No edema noted.     Skin: surgical incisions. No pressure injury per flowsheet documentation.     Estimated Needs:   [ X] no change since previous assessment  [ ] recalculated:       Previous Nutrition Diagnosis:     [X ] Inadequate Energy Intake [ ]Inadequate Oral Intake [ ] Excessive Energy Intake     [ ] Underweight [ ] Increased Nutrient Needs [ ] Overweight/Obesity     [ ] Altered GI Function [ ] Unintended Weight Loss [ ] Food & Nutrition Related Knowledge Deficit [X ] Malnutrition, Severe     Nutrition Diagnosis is [ X] ongoing  [ ] resolved [ ] not applicable     Additional Recommendations:  1. Continue current diet order, which remains appropriate at this time. Please add PO supplement Ensure Enlive 240mls 3x daily (1050kcal, 60g protein) for optimal nutrition.   2. Monitor weights, labs, BM's, skin integrity, p.o. intake.   3. Please Encourage po intake, assist with meals and menu selections, provide alternatives PRN.

## 2019-10-11 NOTE — DISCHARGE NOTE PROVIDER - NSDCCPCAREPLAN_GEN_ALL_CORE_FT
PRINCIPAL DISCHARGE DIAGNOSIS  Diagnosis: Rectal prolapse  Assessment and Plan of Treatment: PRINCIPAL DISCHARGE DIAGNOSIS  Diagnosis: Rectal prolapse  Assessment and Plan of Treatment: s/p rectoplexy  WOUND CARE:  Please keep incisions clean and dry. Please do not Scrub or rub incisions. Do not use lotion or powder on incisions.   BATHING: You may shower and/or sponge bathe. You may use warm soapy water in the shower and rinse, pat dry.  ACTIVITY: No heavy lifting or straining. Otherwise, you may return to your usual level of physical activity. If you are taking narcotic pain medication DO NOT drive a car, operate machinery or make important decisions.  DIET: Return to your usual diet. 1 L fluid restriction for hyponatremia. Miralax PRN to revent constipation  NOTIFY YOUR SURGEON IF YOU HAVE: any bleeding that does not stop, any pus draining from your wound(s), any fever (over 100.4 F) persistent nausea/vomiting, or if your pain is not controlled on your discharge pain medications, unable to urinate.  Please follow up with your primary care physician in one week regarding your hospitalization, bring copies of your discharge paperwork.  Please follow up with your surgeon, Dr. Sullivan      SECONDARY DISCHARGE DIAGNOSES  Diagnosis: Hyponatremia  Assessment and Plan of Treatment: Hyponatremia, you last sodium was 130 continue to take chloride tabs and repeat BMP later this week. Fluid restrict to 1 L per day PRINCIPAL DISCHARGE DIAGNOSIS  Diagnosis: Rectal prolapse  Assessment and Plan of Treatment: s/p rectoplexy  WOUND CARE:  Please keep incisions clean and dry. Please do not Scrub or rub incisions. Do not use lotion or powder on incisions.   BATHING: You may shower and/or sponge bathe. You may use warm soapy water in the shower and rinse, pat dry.  ACTIVITY: No heavy lifting or straining. Otherwise, you may return to your usual level of physical activity. If you are taking narcotic pain medication DO NOT drive a car, operate machinery or make important decisions.  DIET: Return to your usual diet. 1 L fluid restriction for hyponatremia. Miralax PRN to revent constipation  NOTIFY YOUR SURGEON IF YOU HAVE: any bleeding that does not stop, any pus draining from your wound(s), any fever (over 100.4 F) persistent nausea/vomiting, or if your pain is not controlled on your discharge pain medications, unable to urinate.  Please follow up with your primary care physician in one week regarding your hospitalization, bring copies of your discharge paperwork.  Please follow up with your surgeon, Dr. Sullivan      SECONDARY DISCHARGE DIAGNOSES  Diagnosis: Hyponatremia  Assessment and Plan of Treatment: Hyponatremia, you last sodium was 136 continue to take chloride tabs and repeat BMP later this week. Fluid restrict to 1 L per day

## 2019-10-11 NOTE — DISCHARGE NOTE PROVIDER - CARE PROVIDER_API CALL
Sadi Sullivan)  ColonRectal Surgery; Surgery  [facility], [address] [place] [address]  Phone: [phone]  Fax: [phone]  Follow Up Time:

## 2019-10-11 NOTE — PROVIDER CONTACT NOTE (OTHER) - RECOMMENDATIONS
Please come assess patient.
Please come assess patient.
come see pt. VS, EKG, trops
come see pt. order IVF after midnight?
hypoglycemic protocol
MD notified and made aware.

## 2019-10-11 NOTE — DISCHARGE NOTE PROVIDER - NSDCFUADDINST_GEN_ALL_CORE_FT
WOUND CARE:  Please keep incisions clean and dry. Please do not Scrub or rub incisions. Do not use lotion or powder on incisions.   BATHING: You may shower and/or sponge bathe. You may use warm soapy water in the shower and rinse, pat dry.  ACTIVITY: No heavy lifting or straining. Otherwise, you may return to your usual level of physical activity. If you are taking narcotic pain medication DO NOT drive a car, operate machinery or make important decisions.  DIET: Return to your usual diet.  NOTIFY YOUR SURGEON IF YOU HAVE: any bleeding that does not stop, any pus draining from your wound(s), any fever (over 100.4 F) persistent nausea/vomiting, or if your pain is not controlled on your discharge pain medications, unable to urinate.  Please follow up with your primary care physician in one week regarding your hospitalization, bring copies of your discharge paperwork.  Please follow up with your surgeon, Dr. Sullivan as an outpatient, please call to schedule appointment

## 2019-10-11 NOTE — PROVIDER CONTACT NOTE (OTHER) - ASSESSMENT
Pt's HR is 102, /78, RR 20 O2Sat 98%, afebrile. Pt denies pain. Pt is resting in bed. Family at the bedside.
Pt's HR is 111, /83, RR 20 O2Sat 99%, afebrile. Pt denies pain. Pt is resting in bed.
asymptomatic, able to be aroused
pt complained of chest pain and L arm "squeezing", complains of cough with phlegm, denies SOB
pt with noted rectal prolapse slightly protuding from rectum. bowl prep in progress, multiple loose BMs this shift. no c/o pain at the rectal area. pt NPO after midnight, no IVF ordered.
Patient A&Ox3, seems to be confused at times. Garbled speech. Denies abdominal or chest pain. Denies nausea.

## 2019-10-11 NOTE — PROVIDER CONTACT NOTE (OTHER) - BACKGROUND
pt admitted for rectal prolapse s/p reduction
pt admitted for rectal prolapse s/p reduction
s/p open rectopexy
s/p open rectopexy
s/p rectal prolapse reduction
S/P Abdominal rectopexy  10/4

## 2019-10-11 NOTE — DISCHARGE NOTE PROVIDER - HOSPITAL COURSE
78F w/ PMH of Lupus (on long-term prednisone), HTN, Anemia, HLD, Osteoporosis, DM, Gout, Depression, CVA, hx of reducible rectal prolapse now presents with non-reducible rectal prolapse. PT reports that this morning she tried having BM and rectum prolapsed and she couldn't reduce it. Denies fevers, chills, N/V, abdominal pain.         In ED, attempted bedside reduction however unsuccessful.  Patient was brought to the OR for exam under anesthesia, manual reduction of rectal prolapse, and proctoscopy. She tolerated the procedure well and was transferred to the surgical floor for further management. Her diet was slowly advanced as tolerated and she was medically cleared for abdominal rectopexy, which was done on 10/4/19. Post-operatively, she was transferred back to the surgical floor in stable condition. She had a post-operative ileus and was slow to progress, but eventually GI function returned and her diet was advanced as tolerated. PT was consulted, who recommended rehab for the patient. Once she was tolerating a regular diet, voiding without difficulty and pain was well-controlled on oral pain medications, she was found to be stable for discharge to rehab for further strengthening on 10/11/19.         Rx not sent as patient being transferred to rehab.

## 2019-10-11 NOTE — PROGRESS NOTE ADULT - SUBJECTIVE AND OBJECTIVE BOX
INTERVAL HPI/OVERNIGHT EVENTS:    having bowel movements   no abd complaints   no n/v    MEDICATIONS  (STANDING):  acetaminophen   Tablet .. 650 milliGRAM(s) Oral every 6 hours  amLODIPine   Tablet 5 milliGRAM(s) Oral daily  buDESOnide  80 MICROgram(s)/formoterol 4.5 MICROgram(s) Inhaler 2 Puff(s) Inhalation two times a day  cholecalciferol 400 Unit(s) Oral daily  dextrose 5%. 1000 milliLiter(s) (50 mL/Hr) IV Continuous <Continuous>  dextrose 5%. 1000 milliLiter(s) (50 mL/Hr) IV Continuous <Continuous>  dextrose 50% Injectable 12.5 Gram(s) IV Push once  dextrose 50% Injectable 25 Gram(s) IV Push once  dextrose 50% Injectable 25 Gram(s) IV Push once  dextrose 50% Injectable 12.5 Gram(s) IV Push once  dextrose 50% Injectable 25 Gram(s) IV Push once  dextrose 50% Injectable 25 Gram(s) IV Push once  heparin  Injectable 5000 Unit(s) SubCutaneous every 8 hours  influenza   Vaccine 0.5 milliLiter(s) IntraMuscular once  insulin lispro (HumaLOG) corrective regimen sliding scale   SubCutaneous at bedtime  insulin lispro (HumaLOG) corrective regimen sliding scale   SubCutaneous three times a day before meals  losartan 100 milliGRAM(s) Oral daily  magnesium sulfate  IVPB 2 Gram(s) IV Intermittent once  nebivolol 10 milliGRAM(s) Oral daily  pantoprazole    Tablet 40 milliGRAM(s) Oral before breakfast  polyethylene glycol 3350 17 Gram(s) Oral daily  psyllium Powder 1 Packet(s) Oral daily    MEDICATIONS  (PRN):  artificial  tears Solution 1 Drop(s) Both EYES three times a day PRN Dry Eyes  dextrose 40% Gel 15 Gram(s) Oral once PRN Blood Glucose LESS THAN 70 milliGRAM(s)/deciliter  dextrose 40% Gel 15 Gram(s) Oral once PRN Blood Glucose LESS THAN 70 milliGRAM(s)/deciliter  glucagon  Injectable 1 milliGRAM(s) IntraMuscular once PRN Glucose LESS THAN 70 milligrams/deciliter  glucagon  Injectable 1 milliGRAM(s) IntraMuscular once PRN Glucose LESS THAN 70 milligrams/deciliter      Allergies    SULFA (Unknown)  sulfa drugs (Unknown)    Intolerances        Review of Systems:    General:  No wt loss, fevers, chills, night sweats, fatigue   Eyes:  Good vision, no reported pain  ENT:  No sore throat, pain, runny nose, dysphagia  CV:  No pain, palpitations, hypo/hypertension  Resp:  No dyspnea, cough, tachypnea, wheezing  GI:  No pain, No nausea, No vomiting, No diarrhea, No constipation, No weight loss, No fever, No pruritis, No rectal bleeding, No melena, No dysphagia  :  No pain, bleeding, incontinence, nocturia  Muscle:  No pain, weakness  Neuro:  No weakness, tingling, memory problems  Psych:  No fatigue, insomnia, mood problems, depression  Endocrine:  No polyuria, polydypsia, cold/heat intolerance  Heme:  No petechiae, ecchymosis, easy bruisability  Skin:  No rash, tattoos, scars, edema      Vital Signs Last 24 Hrs  T(C): 36.3 (11 Oct 2019 10:05), Max: 37 (11 Oct 2019 02:08)  T(F): 97.3 (11 Oct 2019 10:05), Max: 98.6 (11 Oct 2019 02:08)  HR: 73 (11 Oct 2019 10:05) (71 - 102)  BP: 127/75 (11 Oct 2019 10:05) (122/60 - 137/78)  BP(mean): --  RR: 32 (11 Oct 2019 10:05) (18 - 32)  SpO2: 97% (11 Oct 2019 10:05) (97% - 99%)    PHYSICAL EXAM:    Constitutional: NAD  HEENT: EOMI, throat clear  Neck: No LAD, supple  Respiratory: CTA and P  Cardiovascular: S1 and S2, RRR, no M  Gastrointestinal: BS+, soft, NT/ND, neg HSM,  Extremities: No peripheral edema, neg clubbing, cyanosis  Vascular: 2+ peripheral pulses  Neurological: A/O x 2, no focal deficits  Psychiatric: Normal mood, normal affect  Skin: No rashes      LABS:                        8.7    5.87  )-----------( 291      ( 11 Oct 2019 06:10 )             27.0     10-11    133<L>  |  102  |  11  ----------------------------<  86  3.8   |  20<L>  |  0.44<L>    Ca    8.2<L>      11 Oct 2019 06:10  Phos  3.3     10-11  Mg     1.5     10-11            RADIOLOGY & ADDITIONAL TESTS:

## 2019-10-11 NOTE — PROGRESS NOTE ADULT - SUBJECTIVE AND OBJECTIVE BOX
Patient is a 78y old  Female who presents with a chief complaint of Rectal prolapse (11 Oct 2019 10:28)      SUBJECTIVE / OVERNIGHT EVENTS: overnight events noted    ROS:  Resp: No cough no sputum production  CVS: No chest pain no palpitations no orthopnea  GI: no N/V/D  : no dysuria, no hematuria  Neuro: no weakness no paresthesias  Heme: No petechiae no easy bruising  Msk: No joint pain no swelling  Skin: No rash no itching        MEDICATIONS  (STANDING):  acetaminophen   Tablet .. 650 milliGRAM(s) Oral every 6 hours  amLODIPine   Tablet 5 milliGRAM(s) Oral daily  buDESOnide  80 MICROgram(s)/formoterol 4.5 MICROgram(s) Inhaler 2 Puff(s) Inhalation two times a day  cholecalciferol 400 Unit(s) Oral daily  dextrose 5%. 1000 milliLiter(s) (50 mL/Hr) IV Continuous <Continuous>  dextrose 5%. 1000 milliLiter(s) (50 mL/Hr) IV Continuous <Continuous>  dextrose 50% Injectable 12.5 Gram(s) IV Push once  dextrose 50% Injectable 25 Gram(s) IV Push once  dextrose 50% Injectable 25 Gram(s) IV Push once  dextrose 50% Injectable 12.5 Gram(s) IV Push once  dextrose 50% Injectable 25 Gram(s) IV Push once  dextrose 50% Injectable 25 Gram(s) IV Push once  heparin  Injectable 5000 Unit(s) SubCutaneous every 8 hours  influenza   Vaccine 0.5 milliLiter(s) IntraMuscular once  insulin lispro (HumaLOG) corrective regimen sliding scale   SubCutaneous at bedtime  insulin lispro (HumaLOG) corrective regimen sliding scale   SubCutaneous three times a day before meals  losartan 100 milliGRAM(s) Oral daily  nebivolol 10 milliGRAM(s) Oral daily  pantoprazole    Tablet 40 milliGRAM(s) Oral before breakfast  polyethylene glycol 3350 17 Gram(s) Oral daily  psyllium Powder 1 Packet(s) Oral daily    MEDICATIONS  (PRN):  artificial  tears Solution 1 Drop(s) Both EYES three times a day PRN Dry Eyes  dextrose 40% Gel 15 Gram(s) Oral once PRN Blood Glucose LESS THAN 70 milliGRAM(s)/deciliter  dextrose 40% Gel 15 Gram(s) Oral once PRN Blood Glucose LESS THAN 70 milliGRAM(s)/deciliter  glucagon  Injectable 1 milliGRAM(s) IntraMuscular once PRN Glucose LESS THAN 70 milligrams/deciliter  glucagon  Injectable 1 milliGRAM(s) IntraMuscular once PRN Glucose LESS THAN 70 milligrams/deciliter        CAPILLARY BLOOD GLUCOSE      POCT Blood Glucose.: 127 mg/dL (11 Oct 2019 10:29)  POCT Blood Glucose.: 271 mg/dL (11 Oct 2019 10:09)  POCT Blood Glucose.: 88 mg/dL (11 Oct 2019 09:44)  POCT Blood Glucose.: 92 mg/dL (11 Oct 2019 09:30)  POCT Blood Glucose.: 73 mg/dL (11 Oct 2019 09:10)  POCT Blood Glucose.: 67 mg/dL (11 Oct 2019 08:45)  POCT Blood Glucose.: 118 mg/dL (10 Oct 2019 21:52)  POCT Blood Glucose.: 134 mg/dL (10 Oct 2019 17:01)    I&O's Summary    10 Oct 2019 07:01  -  11 Oct 2019 07:00  --------------------------------------------------------  IN: 1140 mL / OUT: 0 mL / NET: 1140 mL    11 Oct 2019 07:01  -  11 Oct 2019 12:55  --------------------------------------------------------  IN: 240 mL / OUT: 200 mL / NET: 40 mL        Vital Signs Last 24 Hrs  T(C): 36.3 (11 Oct 2019 10:05), Max: 37 (11 Oct 2019 02:08)  T(F): 97.3 (11 Oct 2019 10:05), Max: 98.6 (11 Oct 2019 02:08)  HR: 73 (11 Oct 2019 10:05) (71 - 102)  BP: 127/75 (11 Oct 2019 10:05) (122/60 - 137/78)  BP(mean): --  RR: 32 (11 Oct 2019 10:05) (18 - 32)  SpO2: 97% (11 Oct 2019 10:05) (97% - 99%)    PHYSICAL EXAM:  GENERAL: NAD, cachectic  HEAD:  Atraumatic, Normocephalic  EYES: EOMI, PERRLA, conjunctiva and sclera clear  NECK: Supple, No JVD  CHEST/LUNG: Clear to auscultation bilaterally; No wheeze  HEART: S1S2; No rubs, or gallops, no murmurs  ABDOMEN: Soft, Nontender, Nondistended; Bowel sounds present  EXTREMITIES:  + Peripheral Pulses, No clubbing or cyanosis, no edema  NEUROLOGY: mild left mild residual hemiparesis  SKIN: No rashes or lesion    LABS:                        9.8    7.15  )-----------( 352      ( 11 Oct 2019 11:06 )             30.5     10-11    133<L>  |  102  |  11  ----------------------------<  86  3.8   |  20<L>  |  0.44<L>    Ca    8.2<L>      11 Oct 2019 06:10  Phos  3.3     10-11  Mg     1.5     10-11                  All consultant(s) notes reviewed and care discussed with other providers        Contact Number, Dr Bishop [phone]

## 2019-10-11 NOTE — PROGRESS NOTE ADULT - SUBJECTIVE AND OBJECTIVE BOX
Morning Surgical Progress Note  Patient is a 78y old  Female who presents with a chief complaint of Rectal prolapse       SUBJECTIVE: Patient seen and examined at bedside with surgical team, patient without complaints. Denies nausea, + bowel movements and flatus    PAST MEDICAL & SURGICAL HISTORY:  Diastolic heart failure  Acute Ischemic Right KENY Stroke  History of Leukemia  H/O: Hypertension  Anemia  Lupus  Hypercholesteremia  Diabetes  Depression  Status post cataract extraction and insertion of intraocular lens, unspecified laterality  H/O: Hysterectomy    FAMILY HISTORY:  Family history of cardiomyopathy    REVIEW OF SYSTEMS:  CONSTITUTIONAL: No weakness, fevers or chills  EYES/ENT: No visual changes;  No vertigo or throat pain   NECK: No pain or stiffness  RESPIRATORY: No cough, wheezing, hemoptysis; No shortness of breath  CARDIOVASCULAR: No chest pain or palpitations  GASTROINTESTINAL: No abdominal or epigastric pain. No nausea, vomiting, or hematemesis; No diarrhea or constipation. No melena or hematochezia.  GENITOURINARY: No dysuria, frequency or hematuria  NEUROLOGICAL: No numbness or weakness  SKIN: No itching, rashes    Vital Signs Last 24 Hrs  T(C): 36.9 (09 Oct 2019 10:47), Max: 36.9 (08 Oct 2019 14:14)  T(F): 98.4 (09 Oct 2019 10:47), Max: 98.5 (08 Oct 2019 14:14)  HR: 80 (09 Oct 2019 10:47) (80 - 95)  BP: 124/62 (09 Oct 2019 10:47) (123/63 - 146/68)  BP(mean): --  RR: 17 (09 Oct 2019 10:47) (16 - 18)  SpO2: 94% (09 Oct 2019 10:47) (94% - 100%)I&O's Detail    08 Oct 2019 07:01  -  09 Oct 2019 07:00  --------------------------------------------------------  IN:    dextrose 5% + sodium chloride 0.9% with potassium chloride 20 mEq/L: 1650 mL    IV PiggyBack: 350 mL  Total IN: 2000 mL    OUT:    Nasoenteral Tube: 100 mL  Total OUT: 100 mL    Total NET: 1900 mL        Medications  MEDICATIONS  (STANDING):  amLODIPine   Tablet 5 milliGRAM(s) Oral daily  cholecalciferol 400 Unit(s) Oral daily  dextrose 5% + sodium chloride 0.9% with potassium chloride 20 mEq/L 1000 milliLiter(s) (75 mL/Hr) IV Continuous <Continuous>  dextrose 5%. 1000 milliLiter(s) (50 mL/Hr) IV Continuous <Continuous>  dextrose 50% Injectable 12.5 Gram(s) IV Push once  dextrose 50% Injectable 25 Gram(s) IV Push once  dextrose 50% Injectable 25 Gram(s) IV Push once  heparin  Injectable 5000 Unit(s) SubCutaneous every 8 hours  influenza   Vaccine 0.5 milliLiter(s) IntraMuscular once  insulin lispro (HumaLOG) corrective regimen sliding scale   SubCutaneous three times a day before meals  losartan 100 milliGRAM(s) Oral daily  magnesium sulfate  IVPB 2 Gram(s) IV Intermittent once  pantoprazole    Tablet 40 milliGRAM(s) Oral before breakfast  polyethylene glycol 3350 17 Gram(s) Oral daily  psyllium Powder 1 Packet(s) Oral daily    MEDICATIONS  (PRN):  artificial  tears Solution 1 Drop(s) Both EYES three times a day PRN Dry Eyes  dextrose 40% Gel 15 Gram(s) Oral once PRN Blood Glucose LESS THAN 70 milliGRAM(s)/deciliter  glucagon  Injectable 1 milliGRAM(s) IntraMuscular once PRN Glucose LESS THAN 70 milligrams/deciliter  morphine  - Injectable 2 milliGRAM(s) IV Push every 4 hours PRN Moderate Pain (4 - 6)      Physical Exam  Constitutional: A&O, NAD, thin  Eyes: Scleras clear, PERRLA/ EOMI, Gross vision intact  Gastrointestinal: Soft nontender, nondistended  Extremities: Moving all extremities, no edema GENERAL SURGERY DAILY PROGRESS NOTE:    Interval:  No acute events overnight endorsed.    Subjective:  Patient seen and examined this am. +BM. Counselled rehab    Vital Signs Last 24 Hrs  T(C): 36.5 (11 Oct 2019 06:00), Max: 37.4 (10 Oct 2019 10:22)  T(F): 97.7 (11 Oct 2019 06:00), Max: 99.4 (10 Oct 2019 10:22)  HR: 71 (11 Oct 2019 06:00) (71 - 111)  BP: 133/60 (11 Oct 2019 06:00) (122/60 - 137/78)  BP(mean): --  RR: 18 (11 Oct 2019 06:00) (18 - 20)  SpO2: 98% (11 Oct 2019 06:00) (97% - 99%)    Exam:  Gen: NAD, resting in bed, alert and responding appropriately  Resp: Airway patent, non-labored respirations  Abd: Soft, ND, NTTP x 4 quadrants, no rebound or guarding.   Rectum: Recently changed sp BM  Ext: No edema, WWP  Neuro: AAOx3, no focal deficits    I&O's Detail    10 Oct 2019 07:01  -  11 Oct 2019 07:00  --------------------------------------------------------  IN:    IV PiggyBack: 300 mL    Oral Fluid: 840 mL  Total IN: 1140 mL    OUT:  Total OUT: 0 mL    Total NET: 1140 mL          Daily     Daily     MEDICATIONS  (STANDING):  acetaminophen   Tablet .. 650 milliGRAM(s) Oral every 6 hours  amLODIPine   Tablet 5 milliGRAM(s) Oral daily  buDESOnide  80 MICROgram(s)/formoterol 4.5 MICROgram(s) Inhaler 2 Puff(s) Inhalation two times a day  cholecalciferol 400 Unit(s) Oral daily  dextrose 5%. 1000 milliLiter(s) (50 mL/Hr) IV Continuous <Continuous>  dextrose 5%. 1000 milliLiter(s) (50 mL/Hr) IV Continuous <Continuous>  dextrose 50% Injectable 12.5 Gram(s) IV Push once  dextrose 50% Injectable 25 Gram(s) IV Push once  dextrose 50% Injectable 25 Gram(s) IV Push once  dextrose 50% Injectable 12.5 Gram(s) IV Push once  dextrose 50% Injectable 25 Gram(s) IV Push once  dextrose 50% Injectable 25 Gram(s) IV Push once  heparin  Injectable 5000 Unit(s) SubCutaneous every 8 hours  influenza   Vaccine 0.5 milliLiter(s) IntraMuscular once  insulin lispro (HumaLOG) corrective regimen sliding scale   SubCutaneous at bedtime  insulin lispro (HumaLOG) corrective regimen sliding scale   SubCutaneous three times a day before meals  losartan 100 milliGRAM(s) Oral daily  nebivolol 10 milliGRAM(s) Oral daily  pantoprazole    Tablet 40 milliGRAM(s) Oral before breakfast  polyethylene glycol 3350 17 Gram(s) Oral daily  psyllium Powder 1 Packet(s) Oral daily    MEDICATIONS  (PRN):  artificial  tears Solution 1 Drop(s) Both EYES three times a day PRN Dry Eyes  dextrose 40% Gel 15 Gram(s) Oral once PRN Blood Glucose LESS THAN 70 milliGRAM(s)/deciliter  dextrose 40% Gel 15 Gram(s) Oral once PRN Blood Glucose LESS THAN 70 milliGRAM(s)/deciliter  glucagon  Injectable 1 milliGRAM(s) IntraMuscular once PRN Glucose LESS THAN 70 milligrams/deciliter  glucagon  Injectable 1 milliGRAM(s) IntraMuscular once PRN Glucose LESS THAN 70 milligrams/deciliter      LABS:                        8.7    5.87  )-----------( 291      ( 11 Oct 2019 06:10 )             27.0     10-10    133<L>  |  104  |  13  ----------------------------<  156<H>  4.2   |  21<L>  |  0.41<L>    Ca    8.3<L>      10 Oct 2019 05:30  Phos  1.8     10-10  Mg     1.6     10-10            JAGRUTI Foster, PGY-1  A Team Surgery  [id number] with any questions

## 2019-10-11 NOTE — PROVIDER CONTACT NOTE (OTHER) - SITUATION
hypoglycemic 67 before breakfast, double checked gluclse to be 73 hypoglycemic 67 before breakfast, double checked glucose to be 73

## 2019-10-12 DIAGNOSIS — E87.1 HYPO-OSMOLALITY AND HYPONATREMIA: ICD-10-CM

## 2019-10-12 LAB
ANION GAP SERPL CALC-SCNC: 8 MMO/L — SIGNIFICANT CHANGE UP (ref 7–14)
BUN SERPL-MCNC: 7 MG/DL — SIGNIFICANT CHANGE UP (ref 7–23)
CALCIUM SERPL-MCNC: 8.2 MG/DL — LOW (ref 8.4–10.5)
CHLORIDE SERPL-SCNC: 99 MMOL/L — SIGNIFICANT CHANGE UP (ref 98–107)
CO2 SERPL-SCNC: 22 MMOL/L — SIGNIFICANT CHANGE UP (ref 22–31)
CREAT SERPL-MCNC: 0.43 MG/DL — LOW (ref 0.5–1.3)
GLUCOSE BLDC GLUCOMTR-MCNC: 116 MG/DL — HIGH (ref 70–99)
GLUCOSE BLDC GLUCOMTR-MCNC: 127 MG/DL — HIGH (ref 70–99)
GLUCOSE BLDC GLUCOMTR-MCNC: 166 MG/DL — HIGH (ref 70–99)
GLUCOSE BLDC GLUCOMTR-MCNC: 71 MG/DL — SIGNIFICANT CHANGE UP (ref 70–99)
GLUCOSE SERPL-MCNC: 93 MG/DL — SIGNIFICANT CHANGE UP (ref 70–99)
MAGNESIUM SERPL-MCNC: 1.5 MG/DL — LOW (ref 1.6–2.6)
PHOSPHATE SERPL-MCNC: 3 MG/DL — SIGNIFICANT CHANGE UP (ref 2.5–4.5)
POTASSIUM SERPL-MCNC: 3.5 MMOL/L — SIGNIFICANT CHANGE UP (ref 3.5–5.3)
POTASSIUM SERPL-SCNC: 3.5 MMOL/L — SIGNIFICANT CHANGE UP (ref 3.5–5.3)
SODIUM SERPL-SCNC: 129 MMOL/L — LOW (ref 135–145)

## 2019-10-12 RX ORDER — LANOLIN ALCOHOL/MO/W.PET/CERES
3 CREAM (GRAM) TOPICAL AT BEDTIME
Refills: 0 | Status: DISCONTINUED | OUTPATIENT
Start: 2019-10-12 | End: 2019-10-14

## 2019-10-12 RX ORDER — POTASSIUM CHLORIDE 20 MEQ
20 PACKET (EA) ORAL
Refills: 0 | Status: COMPLETED | OUTPATIENT
Start: 2019-10-12 | End: 2019-10-12

## 2019-10-12 RX ORDER — MAGNESIUM SULFATE 500 MG/ML
2 VIAL (ML) INJECTION ONCE
Refills: 0 | Status: COMPLETED | OUTPATIENT
Start: 2019-10-12 | End: 2019-10-12

## 2019-10-12 RX ORDER — SODIUM CHLORIDE 9 MG/ML
1 INJECTION INTRAMUSCULAR; INTRAVENOUS; SUBCUTANEOUS
Refills: 0 | Status: DISCONTINUED | OUTPATIENT
Start: 2019-10-12 | End: 2019-10-13

## 2019-10-12 RX ADMIN — Medication 1 PACKET(S): at 13:48

## 2019-10-12 RX ADMIN — BUDESONIDE AND FORMOTEROL FUMARATE DIHYDRATE 2 PUFF(S): 160; 4.5 AEROSOL RESPIRATORY (INHALATION) at 21:11

## 2019-10-12 RX ADMIN — HEPARIN SODIUM 5000 UNIT(S): 5000 INJECTION INTRAVENOUS; SUBCUTANEOUS at 13:48

## 2019-10-12 RX ADMIN — HEPARIN SODIUM 5000 UNIT(S): 5000 INJECTION INTRAVENOUS; SUBCUTANEOUS at 05:22

## 2019-10-12 RX ADMIN — Medication 20 MILLIEQUIVALENT(S): at 10:12

## 2019-10-12 RX ADMIN — Medication 20 MILLIEQUIVALENT(S): at 11:45

## 2019-10-12 RX ADMIN — LOSARTAN POTASSIUM 100 MILLIGRAM(S): 100 TABLET, FILM COATED ORAL at 05:22

## 2019-10-12 RX ADMIN — Medication 3 MILLIGRAM(S): at 23:20

## 2019-10-12 RX ADMIN — PANTOPRAZOLE SODIUM 40 MILLIGRAM(S): 20 TABLET, DELAYED RELEASE ORAL at 05:22

## 2019-10-12 RX ADMIN — BUDESONIDE AND FORMOTEROL FUMARATE DIHYDRATE 2 PUFF(S): 160; 4.5 AEROSOL RESPIRATORY (INHALATION) at 17:01

## 2019-10-12 RX ADMIN — SODIUM CHLORIDE 1 GRAM(S): 9 INJECTION INTRAMUSCULAR; INTRAVENOUS; SUBCUTANEOUS at 17:14

## 2019-10-12 RX ADMIN — Medication 50 GRAM(S): at 10:12

## 2019-10-12 RX ADMIN — HEPARIN SODIUM 5000 UNIT(S): 5000 INJECTION INTRAVENOUS; SUBCUTANEOUS at 21:11

## 2019-10-12 NOTE — PROGRESS NOTE ADULT - SUBJECTIVE AND OBJECTIVE BOX
GENERAL SURGERY DAILY PROGRESS NOTE:    Interval:  No acute events overnight endorsed.    Subjective:  Patient seen and examined this am. Endorses soreness. No other complaints. Denies N/V/D. +Tolerating diet. +Passing flatus. +BM.    Vital Signs Last 24 Hrs  T(C): 36.9 (12 Oct 2019 09:44), Max: 36.9 (11 Oct 2019 22:00)  T(F): 98.5 (12 Oct 2019 09:44), Max: 98.5 (12 Oct 2019 05:11)  HR: 78 (12 Oct 2019 09:44) (60 - 78)  BP: 118/77 (12 Oct 2019 09:44) (112/56 - 128/53)  BP(mean): --  RR: 20 (12 Oct 2019 09:44) (18 - 24)  SpO2: 98% (12 Oct 2019 09:44) (96% - 100%)    Exam:  Gen: NAD, resting in bed, alert and responding appropriately  Resp: Airway patent, non-labored respirations  Abd: Soft, ND, NTTP x 4 quadrants, no rebound or guarding.  Ext: No edema, WWP  Neuro: AAOx3, no focal deficits    I&O's Detail    11 Oct 2019 07:01  -  12 Oct 2019 07:00  --------------------------------------------------------  IN:    Oral Fluid: 1070 mL  Total IN: 1070 mL    OUT:    Voided: 600 mL  Total OUT: 600 mL    Total NET: 470 mL      12 Oct 2019 07:01  -  12 Oct 2019 12:51  --------------------------------------------------------  IN:    IV PiggyBack: 50 mL    Oral Fluid: 240 mL  Total IN: 290 mL    OUT:  Total OUT: 0 mL    Total NET: 290 mL          Daily     Daily     MEDICATIONS  (STANDING):  acetaminophen   Tablet .. 650 milliGRAM(s) Oral every 6 hours  amLODIPine   Tablet 5 milliGRAM(s) Oral daily  buDESOnide  80 MICROgram(s)/formoterol 4.5 MICROgram(s) Inhaler 2 Puff(s) Inhalation two times a day  cholecalciferol 400 Unit(s) Oral daily  dextrose 5%. 1000 milliLiter(s) (50 mL/Hr) IV Continuous <Continuous>  dextrose 5%. 1000 milliLiter(s) (50 mL/Hr) IV Continuous <Continuous>  dextrose 50% Injectable 12.5 Gram(s) IV Push once  dextrose 50% Injectable 25 Gram(s) IV Push once  dextrose 50% Injectable 25 Gram(s) IV Push once  dextrose 50% Injectable 12.5 Gram(s) IV Push once  dextrose 50% Injectable 25 Gram(s) IV Push once  dextrose 50% Injectable 25 Gram(s) IV Push once  heparin  Injectable 5000 Unit(s) SubCutaneous every 8 hours  influenza   Vaccine 0.5 milliLiter(s) IntraMuscular once  insulin lispro (HumaLOG) corrective regimen sliding scale   SubCutaneous at bedtime  insulin lispro (HumaLOG) corrective regimen sliding scale   SubCutaneous three times a day before meals  losartan 100 milliGRAM(s) Oral daily  nebivolol 10 milliGRAM(s) Oral daily  pantoprazole    Tablet 40 milliGRAM(s) Oral before breakfast  psyllium Powder 1 Packet(s) Oral every 24 hours  sodium chloride 1 Gram(s) Oral two times a day    MEDICATIONS  (PRN):  artificial  tears Solution 1 Drop(s) Both EYES three times a day PRN Dry Eyes  dextrose 40% Gel 15 Gram(s) Oral once PRN Blood Glucose LESS THAN 70 milliGRAM(s)/deciliter  dextrose 40% Gel 15 Gram(s) Oral once PRN Blood Glucose LESS THAN 70 milliGRAM(s)/deciliter  glucagon  Injectable 1 milliGRAM(s) IntraMuscular once PRN Glucose LESS THAN 70 milligrams/deciliter  glucagon  Injectable 1 milliGRAM(s) IntraMuscular once PRN Glucose LESS THAN 70 milligrams/deciliter      LABS:                        9.8    7.15  )-----------( 352      ( 11 Oct 2019 11:06 )             30.5     10-12    129<L>  |  99  |  7   ----------------------------<  93  3.5   |  22  |  0.43<L>    Ca    8.2<L>      12 Oct 2019 06:30  Phos  3.0     10-12  Mg     1.5     10-12            JAGRUTI Foster, PGY-1  A Team Surgery  [id number] with any questions

## 2019-10-12 NOTE — PROGRESS NOTE ADULT - SUBJECTIVE AND OBJECTIVE BOX
Patient is a 78y old  Female who presents with a chief complaint of Rectal prolapse (11 Oct 2019 12:55)      SUBJECTIVE / OVERNIGHT EVENTS: overnight events noted    ROS:  Resp: No cough no sputum production  CVS: No chest pain no palpitations no orthopnea  GI: no N/V/D  : no dysuria, no hematuria  Neuro: no weakness no paresthesias  Heme: No petechiae no easy bruising  Msk: No joint pain no swelling  Skin: No rash no itching        MEDICATIONS  (STANDING):  acetaminophen   Tablet .. 650 milliGRAM(s) Oral every 6 hours  amLODIPine   Tablet 5 milliGRAM(s) Oral daily  buDESOnide  80 MICROgram(s)/formoterol 4.5 MICROgram(s) Inhaler 2 Puff(s) Inhalation two times a day  cholecalciferol 400 Unit(s) Oral daily  dextrose 5%. 1000 milliLiter(s) (50 mL/Hr) IV Continuous <Continuous>  dextrose 5%. 1000 milliLiter(s) (50 mL/Hr) IV Continuous <Continuous>  dextrose 50% Injectable 12.5 Gram(s) IV Push once  dextrose 50% Injectable 25 Gram(s) IV Push once  dextrose 50% Injectable 25 Gram(s) IV Push once  dextrose 50% Injectable 12.5 Gram(s) IV Push once  dextrose 50% Injectable 25 Gram(s) IV Push once  dextrose 50% Injectable 25 Gram(s) IV Push once  heparin  Injectable 5000 Unit(s) SubCutaneous every 8 hours  influenza   Vaccine 0.5 milliLiter(s) IntraMuscular once  insulin lispro (HumaLOG) corrective regimen sliding scale   SubCutaneous at bedtime  insulin lispro (HumaLOG) corrective regimen sliding scale   SubCutaneous three times a day before meals  losartan 100 milliGRAM(s) Oral daily  nebivolol 10 milliGRAM(s) Oral daily  pantoprazole    Tablet 40 milliGRAM(s) Oral before breakfast  psyllium Powder 1 Packet(s) Oral every 24 hours  sodium chloride 1 Gram(s) Oral two times a day    MEDICATIONS  (PRN):  artificial  tears Solution 1 Drop(s) Both EYES three times a day PRN Dry Eyes  dextrose 40% Gel 15 Gram(s) Oral once PRN Blood Glucose LESS THAN 70 milliGRAM(s)/deciliter  dextrose 40% Gel 15 Gram(s) Oral once PRN Blood Glucose LESS THAN 70 milliGRAM(s)/deciliter  glucagon  Injectable 1 milliGRAM(s) IntraMuscular once PRN Glucose LESS THAN 70 milligrams/deciliter  glucagon  Injectable 1 milliGRAM(s) IntraMuscular once PRN Glucose LESS THAN 70 milligrams/deciliter        CAPILLARY BLOOD GLUCOSE      POCT Blood Glucose.: 127 mg/dL (12 Oct 2019 11:52)  POCT Blood Glucose.: 71 mg/dL (12 Oct 2019 08:52)  POCT Blood Glucose.: 94 mg/dL (11 Oct 2019 22:00)  POCT Blood Glucose.: 101 mg/dL (11 Oct 2019 17:18)  POCT Blood Glucose.: 192 mg/dL (11 Oct 2019 12:56)    I&O's Summary    11 Oct 2019 07:01  -  12 Oct 2019 07:00  --------------------------------------------------------  IN: 1070 mL / OUT: 600 mL / NET: 470 mL    12 Oct 2019 07:01  -  12 Oct 2019 12:42  --------------------------------------------------------  IN: 290 mL / OUT: 0 mL / NET: 290 mL        Vital Signs Last 24 Hrs  T(C): 36.9 (12 Oct 2019 09:44), Max: 36.9 (11 Oct 2019 22:00)  T(F): 98.5 (12 Oct 2019 09:44), Max: 98.5 (12 Oct 2019 05:11)  HR: 78 (12 Oct 2019 09:44) (60 - 78)  BP: 118/77 (12 Oct 2019 09:44) (112/56 - 128/53)  BP(mean): --  RR: 20 (12 Oct 2019 09:44) (18 - 24)  SpO2: 98% (12 Oct 2019 09:44) (96% - 100%)    PHYSICAL EXAM:  GENERAL: NAD, cachectic  HEAD:  Atraumatic, Normocephalic  EYES: EOMI, PERRLA, conjunctiva and sclera clear  NECK: Supple, No JVD  CHEST/LUNG: Clear to auscultation bilaterally; No wheeze  HEART: S1S2; No rubs, or gallops, no murmurs  ABDOMEN: Soft, Nontender, Nondistended; Bowel sounds present  EXTREMITIES:  + Peripheral Pulses, No clubbing or cyanosis, no edema  NEUROLOGY: mild left mild residual hemiparesis  SKIN: No rashes or lesion    LABS:                        9.8    7.15  )-----------( 352      ( 11 Oct 2019 11:06 )             30.5     10-12    129<L>  |  99  |  7   ----------------------------<  93  3.5   |  22  |  0.43<L>    Ca    8.2<L>      12 Oct 2019 06:30  Phos  3.0     10-12  Mg     1.5     10-12                  All consultant(s) notes reviewed and care discussed with other providers        Contact Number, Dr Bishop [phone]

## 2019-10-12 NOTE — PROGRESS NOTE ADULT - PROBLEM SELECTOR PLAN 5
unclear etiology  discussed with surgery attending  colonoscopy is prident prior to surgery  will order CT chest/abdomen/pelvis r/o underlying malignancy   patient has profound cachexia and likely severe protein-calorie malnutrition
unclear etiology  discussed with surgery attending  CT chest/abdomen/pelvis r/o underlying malignancy negative
unclear etiology  discussed with surgery attending  CT chest/abdomen/pelvis r/o underlying malignancy negative  colonoscopy negative   encourage po diet
work up for malignancy negative
unclear etiology  discussed with surgery attending  colonoscopy is prudent prior to surgery  CT chest/abdomen/pelvis r/o underlying malignancy   patient has profound cachexia and likely severe protein-calorie malnutrition
work up for malignancy negative

## 2019-10-12 NOTE — PROGRESS NOTE ADULT - ASSESSMENT
78F s/p open rectopexy on 10/4/19 recovering appropriately    Plan:   - Pureed  - PT: rehab  - f/u GI fx   - OOB as tolerated  - Repleted Na K Mg for hyponatremia slightly low K and hypomagnesemia on labs  - Salt tabs  - 1L fluid restriction    A Team Surgery  [id number]

## 2019-10-13 LAB
ANION GAP SERPL CALC-SCNC: 11 MMO/L — SIGNIFICANT CHANGE UP (ref 7–14)
ANION GAP SERPL CALC-SCNC: 8 MMO/L — SIGNIFICANT CHANGE UP (ref 7–14)
BUN SERPL-MCNC: 10 MG/DL — SIGNIFICANT CHANGE UP (ref 7–23)
BUN SERPL-MCNC: 10 MG/DL — SIGNIFICANT CHANGE UP (ref 7–23)
CALCIUM SERPL-MCNC: 8.3 MG/DL — LOW (ref 8.4–10.5)
CALCIUM SERPL-MCNC: 8.4 MG/DL — SIGNIFICANT CHANGE UP (ref 8.4–10.5)
CHLORIDE SERPL-SCNC: 99 MMOL/L — SIGNIFICANT CHANGE UP (ref 98–107)
CHLORIDE SERPL-SCNC: 99 MMOL/L — SIGNIFICANT CHANGE UP (ref 98–107)
CO2 SERPL-SCNC: 21 MMOL/L — LOW (ref 22–31)
CO2 SERPL-SCNC: 25 MMOL/L — SIGNIFICANT CHANGE UP (ref 22–31)
CREAT SERPL-MCNC: 0.49 MG/DL — LOW (ref 0.5–1.3)
CREAT SERPL-MCNC: 0.52 MG/DL — SIGNIFICANT CHANGE UP (ref 0.5–1.3)
GLUCOSE BLDC GLUCOMTR-MCNC: 103 MG/DL — HIGH (ref 70–99)
GLUCOSE BLDC GLUCOMTR-MCNC: 106 MG/DL — HIGH (ref 70–99)
GLUCOSE BLDC GLUCOMTR-MCNC: 127 MG/DL — HIGH (ref 70–99)
GLUCOSE BLDC GLUCOMTR-MCNC: 129 MG/DL — HIGH (ref 70–99)
GLUCOSE SERPL-MCNC: 120 MG/DL — HIGH (ref 70–99)
GLUCOSE SERPL-MCNC: 152 MG/DL — HIGH (ref 70–99)
HCT VFR BLD CALC: 28.7 % — LOW (ref 34.5–45)
HGB BLD-MCNC: 9 G/DL — LOW (ref 11.5–15.5)
MAGNESIUM SERPL-MCNC: 1.7 MG/DL — SIGNIFICANT CHANGE UP (ref 1.6–2.6)
MAGNESIUM SERPL-MCNC: 2.2 MG/DL — SIGNIFICANT CHANGE UP (ref 1.6–2.6)
MCHC RBC-ENTMCNC: 26.6 PG — LOW (ref 27–34)
MCHC RBC-ENTMCNC: 31.4 % — LOW (ref 32–36)
MCV RBC AUTO: 84.9 FL — SIGNIFICANT CHANGE UP (ref 80–100)
NRBC # FLD: 0 K/UL — SIGNIFICANT CHANGE UP (ref 0–0)
OSMOLALITY SERPL: 278 MOSMO/KG — SIGNIFICANT CHANGE UP (ref 275–295)
PHOSPHATE SERPL-MCNC: 2.5 MG/DL — SIGNIFICANT CHANGE UP (ref 2.5–4.5)
PHOSPHATE SERPL-MCNC: 4.6 MG/DL — HIGH (ref 2.5–4.5)
PLATELET # BLD AUTO: 317 K/UL — SIGNIFICANT CHANGE UP (ref 150–400)
PMV BLD: 11.1 FL — SIGNIFICANT CHANGE UP (ref 7–13)
POTASSIUM SERPL-MCNC: 4 MMOL/L — SIGNIFICANT CHANGE UP (ref 3.5–5.3)
POTASSIUM SERPL-MCNC: 4.7 MMOL/L — SIGNIFICANT CHANGE UP (ref 3.5–5.3)
POTASSIUM SERPL-SCNC: 4 MMOL/L — SIGNIFICANT CHANGE UP (ref 3.5–5.3)
POTASSIUM SERPL-SCNC: 4.7 MMOL/L — SIGNIFICANT CHANGE UP (ref 3.5–5.3)
RBC # BLD: 3.38 M/UL — LOW (ref 3.8–5.2)
RBC # FLD: 14.2 % — SIGNIFICANT CHANGE UP (ref 10.3–14.5)
SODIUM SERPL-SCNC: 131 MMOL/L — LOW (ref 135–145)
SODIUM SERPL-SCNC: 132 MMOL/L — LOW (ref 135–145)
WBC # BLD: 4.66 K/UL — SIGNIFICANT CHANGE UP (ref 3.8–10.5)
WBC # FLD AUTO: 4.66 K/UL — SIGNIFICANT CHANGE UP (ref 3.8–10.5)

## 2019-10-13 RX ORDER — SODIUM CHLORIDE 9 MG/ML
1 INJECTION INTRAMUSCULAR; INTRAVENOUS; SUBCUTANEOUS THREE TIMES A DAY
Refills: 0 | Status: DISCONTINUED | OUTPATIENT
Start: 2019-10-13 | End: 2019-10-14

## 2019-10-13 RX ORDER — MAGNESIUM SULFATE 500 MG/ML
2 VIAL (ML) INJECTION ONCE
Refills: 0 | Status: COMPLETED | OUTPATIENT
Start: 2019-10-13 | End: 2019-10-13

## 2019-10-13 RX ORDER — ACETAMINOPHEN 500 MG
650 TABLET ORAL ONCE
Refills: 0 | Status: COMPLETED | OUTPATIENT
Start: 2019-10-13 | End: 2019-10-13

## 2019-10-13 RX ADMIN — Medication 1 PACKET(S): at 13:14

## 2019-10-13 RX ADMIN — SODIUM CHLORIDE 1 GRAM(S): 9 INJECTION INTRAMUSCULAR; INTRAVENOUS; SUBCUTANEOUS at 06:41

## 2019-10-13 RX ADMIN — AMLODIPINE BESYLATE 5 MILLIGRAM(S): 2.5 TABLET ORAL at 06:41

## 2019-10-13 RX ADMIN — Medication 50 GRAM(S): at 01:26

## 2019-10-13 RX ADMIN — Medication 650 MILLIGRAM(S): at 01:11

## 2019-10-13 RX ADMIN — Medication 3 MILLIGRAM(S): at 21:48

## 2019-10-13 RX ADMIN — SODIUM CHLORIDE 1 GRAM(S): 9 INJECTION INTRAMUSCULAR; INTRAVENOUS; SUBCUTANEOUS at 21:48

## 2019-10-13 RX ADMIN — LOSARTAN POTASSIUM 100 MILLIGRAM(S): 100 TABLET, FILM COATED ORAL at 06:42

## 2019-10-13 RX ADMIN — PANTOPRAZOLE SODIUM 40 MILLIGRAM(S): 20 TABLET, DELAYED RELEASE ORAL at 06:41

## 2019-10-13 RX ADMIN — Medication 650 MILLIGRAM(S): at 01:39

## 2019-10-13 RX ADMIN — BUDESONIDE AND FORMOTEROL FUMARATE DIHYDRATE 2 PUFF(S): 160; 4.5 AEROSOL RESPIRATORY (INHALATION) at 10:00

## 2019-10-13 RX ADMIN — SODIUM CHLORIDE 1 GRAM(S): 9 INJECTION INTRAMUSCULAR; INTRAVENOUS; SUBCUTANEOUS at 13:14

## 2019-10-13 RX ADMIN — HEPARIN SODIUM 5000 UNIT(S): 5000 INJECTION INTRAVENOUS; SUBCUTANEOUS at 06:41

## 2019-10-13 RX ADMIN — HEPARIN SODIUM 5000 UNIT(S): 5000 INJECTION INTRAVENOUS; SUBCUTANEOUS at 21:48

## 2019-10-13 RX ADMIN — BUDESONIDE AND FORMOTEROL FUMARATE DIHYDRATE 2 PUFF(S): 160; 4.5 AEROSOL RESPIRATORY (INHALATION) at 21:48

## 2019-10-13 RX ADMIN — HEPARIN SODIUM 5000 UNIT(S): 5000 INJECTION INTRAVENOUS; SUBCUTANEOUS at 13:14

## 2019-10-13 RX ADMIN — NEBIVOLOL HYDROCHLORIDE 10 MILLIGRAM(S): 5 TABLET ORAL at 06:41

## 2019-10-13 RX ADMIN — Medication 63.75 MILLIMOLE(S): at 01:26

## 2019-10-13 RX ADMIN — POLYETHYLENE GLYCOL 3350 17 GRAM(S): 17 POWDER, FOR SOLUTION ORAL at 08:33

## 2019-10-13 NOTE — PROGRESS NOTE ADULT - SUBJECTIVE AND OBJECTIVE BOX
Patient is a 78y old  Female who presents with a chief complaint of Rectal prolapse (13 Oct 2019 12:12)      SUBJECTIVE / OVERNIGHT EVENTS: overnight events noted    ROS:  Resp: No cough no sputum production  CVS: No chest pain no palpitations no orthopnea  GI: no N/V/D  : no dysuria, no hematuria  Neuro: no weakness no paresthesias  Heme: No petechiae no easy bruising  Msk: No joint pain no swelling  Skin: No rash no itching        MEDICATIONS  (STANDING):  acetaminophen   Tablet .. 650 milliGRAM(s) Oral every 6 hours  amLODIPine   Tablet 5 milliGRAM(s) Oral daily  buDESOnide  80 MICROgram(s)/formoterol 4.5 MICROgram(s) Inhaler 2 Puff(s) Inhalation two times a day  cholecalciferol 400 Unit(s) Oral daily  dextrose 5%. 1000 milliLiter(s) (50 mL/Hr) IV Continuous <Continuous>  dextrose 5%. 1000 milliLiter(s) (50 mL/Hr) IV Continuous <Continuous>  dextrose 50% Injectable 12.5 Gram(s) IV Push once  dextrose 50% Injectable 25 Gram(s) IV Push once  dextrose 50% Injectable 25 Gram(s) IV Push once  dextrose 50% Injectable 12.5 Gram(s) IV Push once  dextrose 50% Injectable 25 Gram(s) IV Push once  dextrose 50% Injectable 25 Gram(s) IV Push once  heparin  Injectable 5000 Unit(s) SubCutaneous every 8 hours  influenza   Vaccine 0.5 milliLiter(s) IntraMuscular once  insulin lispro (HumaLOG) corrective regimen sliding scale   SubCutaneous at bedtime  insulin lispro (HumaLOG) corrective regimen sliding scale   SubCutaneous three times a day before meals  losartan 100 milliGRAM(s) Oral daily  melatonin 3 milliGRAM(s) Oral at bedtime  nebivolol 10 milliGRAM(s) Oral daily  pantoprazole    Tablet 40 milliGRAM(s) Oral before breakfast  polyethylene glycol 3350 17 Gram(s) Oral <User Schedule>  psyllium Powder 1 Packet(s) Oral every 24 hours  sodium chloride 1 Gram(s) Oral three times a day    MEDICATIONS  (PRN):  artificial  tears Solution 1 Drop(s) Both EYES three times a day PRN Dry Eyes  dextrose 40% Gel 15 Gram(s) Oral once PRN Blood Glucose LESS THAN 70 milliGRAM(s)/deciliter  dextrose 40% Gel 15 Gram(s) Oral once PRN Blood Glucose LESS THAN 70 milliGRAM(s)/deciliter  glucagon  Injectable 1 milliGRAM(s) IntraMuscular once PRN Glucose LESS THAN 70 milligrams/deciliter  glucagon  Injectable 1 milliGRAM(s) IntraMuscular once PRN Glucose LESS THAN 70 milligrams/deciliter        CAPILLARY BLOOD GLUCOSE      POCT Blood Glucose.: 103 mg/dL (13 Oct 2019 11:53)  POCT Blood Glucose.: 106 mg/dL (13 Oct 2019 08:27)  POCT Blood Glucose.: 166 mg/dL (12 Oct 2019 22:23)  POCT Blood Glucose.: 116 mg/dL (12 Oct 2019 17:24)    I&O's Summary    12 Oct 2019 07:01  -  13 Oct 2019 07:00  --------------------------------------------------------  IN: 1190 mL / OUT: 0 mL / NET: 1190 mL        Vital Signs Last 24 Hrs  T(C): 36.8 (13 Oct 2019 13:23), Max: 36.8 (12 Oct 2019 18:00)  T(F): 98.3 (13 Oct 2019 13:23), Max: 98.3 (13 Oct 2019 13:23)  HR: 68 (13 Oct 2019 13:23) (54 - 85)  BP: 120/46 (13 Oct 2019 13:23) (110/68 - 122/53)  BP(mean): --  RR: 16 (13 Oct 2019 13:23) (16 - 18)  SpO2: 98% (13 Oct 2019 13:23) (92% - 100%)    PHYSICAL EXAM:  GENERAL: NAD, cachectic  HEAD:  Atraumatic, Normocephalic  EYES: EOMI, PERRLA, conjunctiva and sclera clear  NECK: Supple, No JVD  CHEST/LUNG: Clear to auscultation bilaterally; No wheeze  HEART: S1S2; No rubs, or gallops, no murmurs  ABDOMEN: Soft, Nontender, Nondistended; Bowel sounds present  EXTREMITIES:  + Peripheral Pulses, No clubbing or cyanosis, no edema  NEUROLOGY: no change in mild left mild residual hemiparesis  SKIN: No rashes or lesion    LABS:                        9.0    4.66  )-----------( 317      ( 13 Oct 2019 06:35 )             28.7     10-13    132<L>  |  99  |  10  ----------------------------<  120<H>  4.7   |  25  |  0.49<L>    Ca    8.4      13 Oct 2019 06:35  Phos  4.6     10-13  Mg     2.2     10-13                  All consultant(s) notes reviewed and care discussed with other providers        Contact Number, Dr Bishop [phone]

## 2019-10-13 NOTE — CHART NOTE - NSCHARTNOTEFT_GEN_A_CORE
Paged about abd pain  Pt seen and examined  Soft distended TTP no rebound guarding incision w staples cdi  PO tylenol ordered

## 2019-10-13 NOTE — PROGRESS NOTE ADULT - ASSESSMENT
78F s/p open rectopexy on 10/4/19 recovering appropriately.    Plan:   - Pureed diet, tolerating well  - PT: rehab, pending placement  - OOB as tolerated  - Salt tabs TID  - 1L fluid restriction for hyponatremia      A Team Surgery  [id number]

## 2019-10-13 NOTE — PROGRESS NOTE ADULT - SUBJECTIVE AND OBJECTIVE BOX
GENERAL SURGERY DAILY PROGRESS NOTE:      Subjective:  Patient seen and examined this am. No acute events overnight. No other complaints. Denies N/V/D. +Tolerating diet. +flatus. -+BM yest.        Objective:  Vital Signs Last 24 Hrs  T(C): 36.8 (13 Oct 2019 13:23), Max: 36.8 (12 Oct 2019 18:00)  T(F): 98.3 (13 Oct 2019 13:23), Max: 98.3 (13 Oct 2019 13:23)  HR: 68 (13 Oct 2019 13:23) (54 - 85)  BP: 120/46 (13 Oct 2019 13:23) (110/68 - 122/53)  BP(mean): --  RR: 16 (13 Oct 2019 13:23) (16 - 18)  SpO2: 98% (13 Oct 2019 13:23) (92% - 100%)    I&O's Detail    12 Oct 2019 07:01  -  13 Oct 2019 07:00  --------------------------------------------------------  IN:    IV PiggyBack: 350 mL    Oral Fluid: 840 mL  Total IN: 1190 mL    OUT:  Total OUT: 0 mL    Total NET: 1190 mL      13 Oct 2019 07:01  -  13 Oct 2019 16:08  --------------------------------------------------------  IN:  Total IN: 0 mL    OUT:  Total OUT: 0 mL    Total NET: 0 mL      MEDICATIONS  (STANDING):  acetaminophen   Tablet .. 650 milliGRAM(s) Oral every 6 hours  amLODIPine   Tablet 5 milliGRAM(s) Oral daily  buDESOnide  80 MICROgram(s)/formoterol 4.5 MICROgram(s) Inhaler 2 Puff(s) Inhalation two times a day  cholecalciferol 400 Unit(s) Oral daily  dextrose 5%. 1000 milliLiter(s) (50 mL/Hr) IV Continuous <Continuous>  dextrose 5%. 1000 milliLiter(s) (50 mL/Hr) IV Continuous <Continuous>  dextrose 50% Injectable 12.5 Gram(s) IV Push once  dextrose 50% Injectable 25 Gram(s) IV Push once  dextrose 50% Injectable 25 Gram(s) IV Push once  dextrose 50% Injectable 12.5 Gram(s) IV Push once  dextrose 50% Injectable 25 Gram(s) IV Push once  dextrose 50% Injectable 25 Gram(s) IV Push once  heparin  Injectable 5000 Unit(s) SubCutaneous every 8 hours  influenza   Vaccine 0.5 milliLiter(s) IntraMuscular once  insulin lispro (HumaLOG) corrective regimen sliding scale   SubCutaneous at bedtime  insulin lispro (HumaLOG) corrective regimen sliding scale   SubCutaneous three times a day before meals  losartan 100 milliGRAM(s) Oral daily  melatonin 3 milliGRAM(s) Oral at bedtime  nebivolol 10 milliGRAM(s) Oral daily  pantoprazole    Tablet 40 milliGRAM(s) Oral before breakfast  polyethylene glycol 3350 17 Gram(s) Oral <User Schedule>  psyllium Powder 1 Packet(s) Oral every 24 hours  sodium chloride 1 Gram(s) Oral three times a day    MEDICATIONS  (PRN):  artificial  tears Solution 1 Drop(s) Both EYES three times a day PRN Dry Eyes  dextrose 40% Gel 15 Gram(s) Oral once PRN Blood Glucose LESS THAN 70 milliGRAM(s)/deciliter  dextrose 40% Gel 15 Gram(s) Oral once PRN Blood Glucose LESS THAN 70 milliGRAM(s)/deciliter  glucagon  Injectable 1 milliGRAM(s) IntraMuscular once PRN Glucose LESS THAN 70 milligrams/deciliter  glucagon  Injectable 1 milliGRAM(s) IntraMuscular once PRN Glucose LESS THAN 70 milligrams/deciliter                            9.0    4.66  )-----------( 317      ( 13 Oct 2019 06:35 )             28.7       10-13    132<L>  |  99  |  10  ----------------------------<  120<H>  4.7   |  25  |  0.49<L>    Ca    8.4      13 Oct 2019 06:35  Phos  4.6     10-13  Mg     2.2     10-13      Exam:  Gen: NAD, resting in bed, alert and responding appropriately  Resp: Airway patent, non-labored respirations  Abd: Soft, ND, NTTP x 4 quadrants, no rebound or guarding.  Ext: No edema, WWP  Neuro: AAOx3, no focal deficits

## 2019-10-14 ENCOUNTER — TRANSCRIPTION ENCOUNTER (OUTPATIENT)
Age: 79
End: 2019-10-14

## 2019-10-14 VITALS
SYSTOLIC BLOOD PRESSURE: 107 MMHG | RESPIRATION RATE: 16 BRPM | DIASTOLIC BLOOD PRESSURE: 53 MMHG | HEART RATE: 71 BPM | TEMPERATURE: 98 F | OXYGEN SATURATION: 98 %

## 2019-10-14 LAB
ANION GAP SERPL CALC-SCNC: 13 MMO/L — SIGNIFICANT CHANGE UP (ref 7–14)
BUN SERPL-MCNC: 9 MG/DL — SIGNIFICANT CHANGE UP (ref 7–23)
CALCIUM SERPL-MCNC: 8.4 MG/DL — SIGNIFICANT CHANGE UP (ref 8.4–10.5)
CHLORIDE SERPL-SCNC: 101 MMOL/L — SIGNIFICANT CHANGE UP (ref 98–107)
CO2 SERPL-SCNC: 22 MMOL/L — SIGNIFICANT CHANGE UP (ref 22–31)
CREAT SERPL-MCNC: 0.51 MG/DL — SIGNIFICANT CHANGE UP (ref 0.5–1.3)
GLUCOSE BLDC GLUCOMTR-MCNC: 108 MG/DL — HIGH (ref 70–99)
GLUCOSE BLDC GLUCOMTR-MCNC: 121 MG/DL — HIGH (ref 70–99)
GLUCOSE SERPL-MCNC: 103 MG/DL — HIGH (ref 70–99)
HCT VFR BLD CALC: 26.6 % — LOW (ref 34.5–45)
HGB BLD-MCNC: 8.5 G/DL — LOW (ref 11.5–15.5)
MAGNESIUM SERPL-MCNC: 1.5 MG/DL — LOW (ref 1.6–2.6)
MCHC RBC-ENTMCNC: 27.2 PG — SIGNIFICANT CHANGE UP (ref 27–34)
MCHC RBC-ENTMCNC: 32 % — SIGNIFICANT CHANGE UP (ref 32–36)
MCV RBC AUTO: 85 FL — SIGNIFICANT CHANGE UP (ref 80–100)
NRBC # FLD: 0 K/UL — SIGNIFICANT CHANGE UP (ref 0–0)
PHOSPHATE SERPL-MCNC: 3.2 MG/DL — SIGNIFICANT CHANGE UP (ref 2.5–4.5)
PLATELET # BLD AUTO: 323 K/UL — SIGNIFICANT CHANGE UP (ref 150–400)
PMV BLD: 10.6 FL — SIGNIFICANT CHANGE UP (ref 7–13)
POTASSIUM SERPL-MCNC: 4.2 MMOL/L — SIGNIFICANT CHANGE UP (ref 3.5–5.3)
POTASSIUM SERPL-SCNC: 4.2 MMOL/L — SIGNIFICANT CHANGE UP (ref 3.5–5.3)
RBC # BLD: 3.13 M/UL — LOW (ref 3.8–5.2)
RBC # FLD: 14.7 % — HIGH (ref 10.3–14.5)
SODIUM SERPL-SCNC: 136 MMOL/L — SIGNIFICANT CHANGE UP (ref 135–145)
WBC # BLD: 5.12 K/UL — SIGNIFICANT CHANGE UP (ref 3.8–10.5)
WBC # FLD AUTO: 5.12 K/UL — SIGNIFICANT CHANGE UP (ref 3.8–10.5)

## 2019-10-14 RX ORDER — MAGNESIUM SULFATE 500 MG/ML
2 VIAL (ML) INJECTION ONCE
Refills: 0 | Status: COMPLETED | OUTPATIENT
Start: 2019-10-14 | End: 2019-10-14

## 2019-10-14 RX ORDER — SODIUM CHLORIDE 9 MG/ML
1 INJECTION INTRAMUSCULAR; INTRAVENOUS; SUBCUTANEOUS
Qty: 0 | Refills: 0 | DISCHARGE
Start: 2019-10-14

## 2019-10-14 RX ADMIN — PANTOPRAZOLE SODIUM 40 MILLIGRAM(S): 20 TABLET, DELAYED RELEASE ORAL at 05:49

## 2019-10-14 RX ADMIN — BUDESONIDE AND FORMOTEROL FUMARATE DIHYDRATE 2 PUFF(S): 160; 4.5 AEROSOL RESPIRATORY (INHALATION) at 09:30

## 2019-10-14 RX ADMIN — SODIUM CHLORIDE 1 GRAM(S): 9 INJECTION INTRAMUSCULAR; INTRAVENOUS; SUBCUTANEOUS at 13:20

## 2019-10-14 RX ADMIN — HEPARIN SODIUM 5000 UNIT(S): 5000 INJECTION INTRAVENOUS; SUBCUTANEOUS at 13:20

## 2019-10-14 RX ADMIN — SODIUM CHLORIDE 1 GRAM(S): 9 INJECTION INTRAMUSCULAR; INTRAVENOUS; SUBCUTANEOUS at 05:49

## 2019-10-14 RX ADMIN — Medication 400 UNIT(S): at 13:20

## 2019-10-14 RX ADMIN — Medication 50 GRAM(S): at 13:21

## 2019-10-14 RX ADMIN — Medication 1 PACKET(S): at 13:20

## 2019-10-14 RX ADMIN — HEPARIN SODIUM 5000 UNIT(S): 5000 INJECTION INTRAVENOUS; SUBCUTANEOUS at 05:49

## 2019-10-14 NOTE — PROGRESS NOTE ADULT - REASON FOR ADMISSION
Rectal prolapse

## 2019-10-14 NOTE — DISCHARGE NOTE NURSING/CASE MANAGEMENT/SOCIAL WORK - NSDCPEPT PROEDHF_GEN_ALL_CORE
Monitor weight daily/Report signs and symptoms to primary care provider/Activities as tolerated/Call primary care provider for follow up after discharge/Low salt diet

## 2019-10-14 NOTE — PROGRESS NOTE ADULT - ASSESSMENT
78F s/p open rectopexy on 10/4/19 recovering appropriately.    Plan:   - Pureed diet, tolerating well  - PT: rehab, pending placement today  - OOB as tolerated  - Salt tabs TID for hypontremia  - 1L fluid restriction for hyponatremia      A Team Surgery  [id number]

## 2019-10-14 NOTE — PROGRESS NOTE ADULT - PROBLEM SELECTOR PLAN 4
Advanced care planning was discussed with patient and family.  Advanced care planning forms were reviewed and discussed.  Risks, benefits and alternatives of gastroenterologic procedures were discussed in detail and all questions were answered.    30 minutes spent.
appears quiescent  AM cortisol ordered for tomorrow   Recommend  hydrocortisone 50 q 8 markel-op  Recommend start on day of surgery x 2 days
appears quiescent  patient has NOT been on steroids and has no signs or symptoms of adrenal insufficiency  I will order AM cortisol for tomorrow   however it will be prudent to give hydrocortisone 50 q 8 markel-op as she likely has been on long term steroids in the past and the pituitary-adrenal axis does not always recover fully even after discontinuation of steroids  Recommend start on day of surgery x 2 days
stable  will continue to monitor
work up for malignancy negative
work up for malignancy negative
appears quiescent  patient has NOT been on steroids and has no signs or symptoms of adrenal insufficiency  I will order AM cortisol for tomorrow   however it will be prudent to give hydrocortisone 50 q 8 markel-op as she likely has been on long term steroids in the past and the pituitary-adrenal axis does not always recover fully even after discontinuation of steroids  Recommend start on day of surgery x 2 days
stable  will continue to monitor
appears quiescent  patient has NOT been on steroids and has no signs or symptoms of adrenal insufficiency  I will order AM cortisol for tomorrow   however it will be prudent to give hydrocortisone 50 q 8 markel-op as she likely has been on long term steroids in the past and the pituitary-adrenal axis does not always recover fully even after discontinuation of steroids  Recommend start on day of surgery x 2 days

## 2019-10-14 NOTE — PROGRESS NOTE ADULT - SUBJECTIVE AND OBJECTIVE BOX
INTERVAL HPI/OVERNIGHT EVENTS:    having bowel movements   no n/v    MEDICATIONS  (STANDING):  acetaminophen   Tablet .. 650 milliGRAM(s) Oral every 6 hours  amLODIPine   Tablet 5 milliGRAM(s) Oral daily  buDESOnide  80 MICROgram(s)/formoterol 4.5 MICROgram(s) Inhaler 2 Puff(s) Inhalation two times a day  cholecalciferol 400 Unit(s) Oral daily  dextrose 5%. 1000 milliLiter(s) (50 mL/Hr) IV Continuous <Continuous>  dextrose 5%. 1000 milliLiter(s) (50 mL/Hr) IV Continuous <Continuous>  dextrose 50% Injectable 12.5 Gram(s) IV Push once  dextrose 50% Injectable 25 Gram(s) IV Push once  dextrose 50% Injectable 25 Gram(s) IV Push once  dextrose 50% Injectable 12.5 Gram(s) IV Push once  dextrose 50% Injectable 25 Gram(s) IV Push once  dextrose 50% Injectable 25 Gram(s) IV Push once  heparin  Injectable 5000 Unit(s) SubCutaneous every 8 hours  influenza   Vaccine 0.5 milliLiter(s) IntraMuscular once  insulin lispro (HumaLOG) corrective regimen sliding scale   SubCutaneous at bedtime  insulin lispro (HumaLOG) corrective regimen sliding scale   SubCutaneous three times a day before meals  losartan 100 milliGRAM(s) Oral daily  magnesium sulfate  IVPB 2 Gram(s) IV Intermittent once  nebivolol 10 milliGRAM(s) Oral daily  pantoprazole    Tablet 40 milliGRAM(s) Oral before breakfast  polyethylene glycol 3350 17 Gram(s) Oral daily  psyllium Powder 1 Packet(s) Oral daily    MEDICATIONS  (PRN):  artificial  tears Solution 1 Drop(s) Both EYES three times a day PRN Dry Eyes  dextrose 40% Gel 15 Gram(s) Oral once PRN Blood Glucose LESS THAN 70 milliGRAM(s)/deciliter  dextrose 40% Gel 15 Gram(s) Oral once PRN Blood Glucose LESS THAN 70 milliGRAM(s)/deciliter  glucagon  Injectable 1 milliGRAM(s) IntraMuscular once PRN Glucose LESS THAN 70 milligrams/deciliter  glucagon  Injectable 1 milliGRAM(s) IntraMuscular once PRN Glucose LESS THAN 70 milligrams/deciliter      Allergies    SULFA (Unknown)  sulfa drugs (Unknown)    Intolerances        Review of Systems:    General:  No wt loss, fevers, chills, night sweats, fatigue   Eyes:  Good vision, no reported pain  ENT:  No sore throat, pain, runny nose, dysphagia  CV:  No pain, palpitations, hypo/hypertension  Resp:  No dyspnea, cough, tachypnea, wheezing  GI:  No pain, No nausea, No vomiting, No diarrhea, No constipation, No weight loss, No fever, No pruritis, No rectal bleeding, No melena, No dysphagia  :  No pain, bleeding, incontinence, nocturia  Muscle:  No pain, weakness  Neuro:  No weakness, tingling, memory problems  Psych:  No fatigue, insomnia, mood problems, depression  Endocrine:  No polyuria, polydypsia, cold/heat intolerance  Heme:  No petechiae, ecchymosis, easy bruisability  Skin:  No rash, tattoos, scars, edema      Vital Signs Last 24 Hrs  T(C): 36.3 (11 Oct 2019 10:05), Max: 37 (11 Oct 2019 02:08)  T(F): 97.3 (11 Oct 2019 10:05), Max: 98.6 (11 Oct 2019 02:08)  HR: 73 (11 Oct 2019 10:05) (71 - 102)  BP: 127/75 (11 Oct 2019 10:05) (122/60 - 137/78)  BP(mean): --  RR: 32 (11 Oct 2019 10:05) (18 - 32)  SpO2: 97% (11 Oct 2019 10:05) (97% - 99%)    PHYSICAL EXAM:    Constitutional: NAD  HEENT: EOMI, throat clear  Neck: No LAD, supple  Respiratory: CTA and P  Cardiovascular: S1 and S2, RRR, no M  Gastrointestinal: BS+, soft, NT/ND, neg HSM,  Extremities: No peripheral edema, neg clubbing, cyanosis  Vascular: 2+ peripheral pulses  Neurological: A/O x 2, no focal deficits  Psychiatric: Normal mood, normal affect  Skin: No rashes      LABS:                        8.7    5.87  )-----------( 291      ( 11 Oct 2019 06:10 )             27.0     10-11    133<L>  |  102  |  11  ----------------------------<  86  3.8   |  20<L>  |  0.44<L>    Ca    8.2<L>      11 Oct 2019 06:10  Phos  3.3     10-11  Mg     1.5     10-11            RADIOLOGY & ADDITIONAL TESTS:

## 2019-10-14 NOTE — PROGRESS NOTE ADULT - PROBLEM SELECTOR PLAN 2
- care per medicine team appreciated
- s/p Colonoscopy unremarkable except for melanosis  - trend daily labs   - avoid straining; monitor bowel movements   - rectopexy with surgery team 10/4  - further care per surgery recs
- care per medicine team appreciated
- s/p Colonoscopy unremarkable except for melanosis  - trend daily labs   - avoid straining; monitor bowel movements   - rectopexy with surgery team 10/4  - further care per surgery recs
acceptable  continue amlodipine and Losartan with hold parameters   continue to monitor
acceptable  continue amlodipine and Losartan with hold parameters   continue to monitor
acceptable for now  continue to monitor
acceptable  continue amlodipine and Losartan with hold parameters   continue to monitor
acceptable  patient with vomiting  can watch is NPO off meds
acceptable  continue amlodipine and Losartan with hold parameters   continue to monitor
- s/p Colonoscopy unremarkable except for melanosis  - trend daily labs   - avoid straining; monitor bowel movements   - rectopexy with surgery team 10/4  - further care per surgery recs

## 2019-10-14 NOTE — PROGRESS NOTE ADULT - PROBLEM SELECTOR PROBLEM 2
Lupus
Rectal prolapse
H/O: Hypertension
Lupus
Rectal prolapse
H/O: Hypertension
Rectal prolapse

## 2019-10-14 NOTE — PROGRESS NOTE ADULT - PROBLEM SELECTOR PLAN 1
- s/p successful reduction of rectal prolapse under anesthesia with surgery team   - trend daily labs   - avoid straining   - plans for rectopexy with surgery team   - clear diet and bowel prep ordered  - NPO p MN for Colonoscopy tomorrow
- noted on AXR, improved s/p NGT placement now removed   - having gi function, cont to monitor  - on puree diet
- noted on AXR  - pt with episodes of emesis; consider CT to r/o obstruction   - IVF/NPO +/- NGT placement if continues to vomit  - monitor gi function   - fu surgery recs
- noted on AXR  - s/p NGT placement 2/2 fecal emesis yesterday now w/improvement   - cont to monitor gi function   - monitor NGT output   - fu surgery recs; input appreciated
- noted on AXR, improved s/p NGT placement now removed   - having gi function, cont to monitor
- noted on AXR, improved s/p NGT placement now removed   - having gi function, cont to monitor  - on puree diet
- noted on AXR, improved s/p NGT placement now removed   - having gi function, cont to monitor  - on puree diet
- noted on AXR, improving s/p NGT placement now removed   - cont to monitor gi function
- s/p Colonoscopy unremarkable except for melanosis  - trend daily labs   - avoid straining   - plans for rectopexy with surgery team   - diet per surgery
- s/p Colonoscopy unremarkable except for melanosis  - trend daily labs   - avoid straining   - rectopexy with surgery team 10/4
- s/p Colonoscopy unremarkable except for melanosis  - trend daily labs   - avoid straining   - rectopexy with surgery team 10/4  - diet per surgery
- s/p Colonoscopy unremarkable except for melanosis  - trend daily labs   - avoid straining; monitor bowel movements   - rectopexy with surgery team 10/4  - tolerating po intake
- s/p Colonoscopy unremarkable except for melanosis  - trend daily labs   - avoid straining; monitor bowel movements   - rectopexy with surgery team 10/4  - tolerating po intake  - dc planning to rehab in progress
echocardiogram noted  patient is optimized for surgery
likely secondary to ileus  will defer to surgery for management  will continue to monitor
patient is optimized for surgery if needed per surgery
resolved  continue to monitor  no other intervention required at this time
resolved  diet advance per surgery
unclear etiology but most likely secondary to SIADH  will restrict fluids to 1 liter 24 hrs  send osm studies  continue to monitor
unclear etiology but most likely secondary to SIADH  will restrict fluids to 1 liter 24 hrs  serum osm low   urine osm pending  continue to monitor  no other intervention required at this time
echocardiogram noted  patient is optimized for the procedure
stable   post op  defer to surgery   overnight vomiting   CT abd recommended by GI
echocardiogram noted  patient is optimized for the procedure
- noted on AXR, improved s/p NGT placement now removed   - having gi function, cont to monitor  - on puree diet

## 2019-10-14 NOTE — PROGRESS NOTE ADULT - ATTENDING COMMENTS
discussed with patient in detail, all questions answered.  discussed with surgery attending Dr Sullivan
S/P Rectopexy  -Bowel regimen  -regular diet  -OOB  -DVT ppx  -Rehab planning
s/p rectopexy  -Regular diet  -miralax daily  -metamucil daily  -pain control  -DVT ppx
s/p rectopexy  -emesis overnight  -NPO w/ sips  -Change IVF to NaCl as Yc=349 and decrease CO2  -DVT ppx  -F/u UOP
medical optimization  OR planning
s/p rectopexy  -tolerating diet  -+BM and flatus  -Rehab planning  -DVT ppx
discharge planning
discharge to Subacute Rehab pending
discharge planning
discharge planning in progress
discharge planning to Subacute Rehab
discharge to Subacute Rehab when bed available
discussed with patient in detail, all questions answered.
discussed with patient in detail, all questions answered.

## 2019-10-14 NOTE — PROGRESS NOTE ADULT - SUBJECTIVE AND OBJECTIVE BOX
GENERAL SURGERY DAILY PROGRESS NOTE:      Subjective:  Patient seen and examined this am. No acute events overnight. No other complaints. Denies N/V/D. +Tolerating diet. +flatus +BM      Objective:  Vital Signs Last 24 Hrs  T(C): 36.6 (14 Oct 2019 05:44), Max: 37.2 (14 Oct 2019 02:15)  T(F): 97.9 (14 Oct 2019 05:44), Max: 99 (14 Oct 2019 02:15)  HR: 60 (14 Oct 2019 05:44) (60 - 68)  BP: 124/56 (14 Oct 2019 05:44) (108/49 - 124/56)  BP(mean): --  RR: 20 (14 Oct 2019 05:44) (16 - 20)  SpO2: 99% (14 Oct 2019 05:44) (96% - 99%)    I&O's Detail    12 Oct 2019 07:01  -  13 Oct 2019 07:00  --------------------------------------------------------  IN:    IV PiggyBack: 350 mL    Oral Fluid: 840 mL  Total IN: 1190 mL    OUT:  Total OUT: 0 mL    Total NET: 1190 mL      13 Oct 2019 07:01  -  14 Oct 2019 06:49  --------------------------------------------------------  IN:    Oral Fluid: 60 mL  Total IN: 60 mL    OUT:    Voided: 175 mL  Total OUT: 175 mL    Total NET: -115 mL          MEDICATIONS  (STANDING):  acetaminophen   Tablet .. 650 milliGRAM(s) Oral every 6 hours  amLODIPine   Tablet 5 milliGRAM(s) Oral daily  buDESOnide  80 MICROgram(s)/formoterol 4.5 MICROgram(s) Inhaler 2 Puff(s) Inhalation two times a day  cholecalciferol 400 Unit(s) Oral daily  dextrose 5%. 1000 milliLiter(s) (50 mL/Hr) IV Continuous <Continuous>  dextrose 5%. 1000 milliLiter(s) (50 mL/Hr) IV Continuous <Continuous>  dextrose 50% Injectable 12.5 Gram(s) IV Push once  dextrose 50% Injectable 25 Gram(s) IV Push once  dextrose 50% Injectable 25 Gram(s) IV Push once  dextrose 50% Injectable 12.5 Gram(s) IV Push once  dextrose 50% Injectable 25 Gram(s) IV Push once  dextrose 50% Injectable 25 Gram(s) IV Push once  heparin  Injectable 5000 Unit(s) SubCutaneous every 8 hours  influenza   Vaccine 0.5 milliLiter(s) IntraMuscular once  insulin lispro (HumaLOG) corrective regimen sliding scale   SubCutaneous at bedtime  insulin lispro (HumaLOG) corrective regimen sliding scale   SubCutaneous three times a day before meals  losartan 100 milliGRAM(s) Oral daily  melatonin 3 milliGRAM(s) Oral at bedtime  nebivolol 10 milliGRAM(s) Oral daily  pantoprazole    Tablet 40 milliGRAM(s) Oral before breakfast  polyethylene glycol 3350 17 Gram(s) Oral <User Schedule>  psyllium Powder 1 Packet(s) Oral every 24 hours  sodium chloride 1 Gram(s) Oral three times a day    MEDICATIONS  (PRN):  artificial  tears Solution 1 Drop(s) Both EYES three times a day PRN Dry Eyes  dextrose 40% Gel 15 Gram(s) Oral once PRN Blood Glucose LESS THAN 70 milliGRAM(s)/deciliter  dextrose 40% Gel 15 Gram(s) Oral once PRN Blood Glucose LESS THAN 70 milliGRAM(s)/deciliter  glucagon  Injectable 1 milliGRAM(s) IntraMuscular once PRN Glucose LESS THAN 70 milligrams/deciliter  glucagon  Injectable 1 milliGRAM(s) IntraMuscular once PRN Glucose LESS THAN 70 milligrams/deciliter                            9.0    4.66  )-----------( 317      ( 13 Oct 2019 06:35 )             28.7       10-13    132<L>  |  99  |  10  ----------------------------<  120<H>  4.7   |  25  |  0.49<L>    Ca    8.4      13 Oct 2019 06:35  Phos  4.6     10-13  Mg     2.2     10-13          Exam:  Gen: NAD, resting in bed, alert and responding appropriately  Resp: Airway patent, non-labored respirations  Abd: Soft, ND, NTTP x 4 quadrants, no rebound or guarding.  Ext: No edema, WWP  Neuro: AAOx3, no focal deficits

## 2019-10-14 NOTE — PROGRESS NOTE ADULT - PROBLEM SELECTOR PLAN 3
- care per medicine team appreciated
Advanced care planning was discussed with patient and family.  Advanced care planning forms were reviewed and discussed.  Risks, benefits and alternatives of gastroenterologic procedures were discussed in detail and all questions were answered.    30 minutes spent.
- care per medicine team appreciated
Advanced care planning was discussed with patient and family.  Advanced care planning forms were reviewed and discussed.  Risks, benefits and alternatives of gastroenterologic procedures were discussed in detail and all questions were answered.    30 minutes spent.
HbA1C 6  no intervention required at this time
HbA1C 6  no intervention required at this time  finger sticks NOT needed  will continue to monitor AM glu on BMPs
no intervention required at this time
stable  will continue to monitor
stable  will continue to monitor
HbA1C 6  no intervention required at this time  finger sticks NOT needed  will continue to monitor AM glu on BMPs
no intervention required at this time
HbA1C 6  no intervention required at this time  finger sticks NOT needed  will continue to monitor AM glu on BMPs

## 2019-10-14 NOTE — PROGRESS NOTE ADULT - PROBLEM SELECTOR PROBLEM 1
Ileus, postoperative
Hyponatremia
Ileus, postoperative
Preoperative clearance
Preoperative clearance
Rectal prolapse
Vomiting
Preoperative clearance
Ileus, postoperative
Rectal prolapse

## 2019-10-14 NOTE — PROGRESS NOTE ADULT - PROVIDER SPECIALTY LIST ADULT
Anesthesia
Colorectal Surgery
Gastroenterology
Internal Medicine
Surgery
Internal Medicine
Colorectal Surgery
Internal Medicine
Gastroenterology

## 2019-10-14 NOTE — PROGRESS NOTE ADULT - PROBLEM SELECTOR PROBLEM 3
Lupus
ACP (advance care planning)
Lupus
ACP (advance care planning)
Diabetes
Lupus
Diabetes

## 2019-10-14 NOTE — PROGRESS NOTE ADULT - PROBLEM SELECTOR PROBLEM 4
ACP (advance care planning)
Cachexia
Cachexia
Lupus

## 2019-10-14 NOTE — PROGRESS NOTE ADULT - SUBJECTIVE AND OBJECTIVE BOX
Patient is a 78y old  Female who presents with a chief complaint of Rectal prolapse (14 Oct 2019 06:48)      SUBJECTIVE / OVERNIGHT EVENTS: overnight events noted    ROS:  Resp: No cough no sputum production  CVS: No chest pain no palpitations no orthopnea  GI: no N/V/D  : no dysuria, no hematuria  Neuro: no weakness no paresthesias  Heme: No petechiae no easy bruising  Msk: No joint pain no swelling  Skin: No rash no itching        MEDICATIONS  (STANDING):  acetaminophen   Tablet .. 650 milliGRAM(s) Oral every 6 hours  amLODIPine   Tablet 5 milliGRAM(s) Oral daily  buDESOnide  80 MICROgram(s)/formoterol 4.5 MICROgram(s) Inhaler 2 Puff(s) Inhalation two times a day  cholecalciferol 400 Unit(s) Oral daily  dextrose 5%. 1000 milliLiter(s) (50 mL/Hr) IV Continuous <Continuous>  dextrose 5%. 1000 milliLiter(s) (50 mL/Hr) IV Continuous <Continuous>  dextrose 50% Injectable 12.5 Gram(s) IV Push once  dextrose 50% Injectable 25 Gram(s) IV Push once  dextrose 50% Injectable 25 Gram(s) IV Push once  dextrose 50% Injectable 12.5 Gram(s) IV Push once  dextrose 50% Injectable 25 Gram(s) IV Push once  dextrose 50% Injectable 25 Gram(s) IV Push once  heparin  Injectable 5000 Unit(s) SubCutaneous every 8 hours  influenza   Vaccine 0.5 milliLiter(s) IntraMuscular once  insulin lispro (HumaLOG) corrective regimen sliding scale   SubCutaneous at bedtime  insulin lispro (HumaLOG) corrective regimen sliding scale   SubCutaneous three times a day before meals  losartan 100 milliGRAM(s) Oral daily  magnesium sulfate  IVPB 2 Gram(s) IV Intermittent once  melatonin 3 milliGRAM(s) Oral at bedtime  nebivolol 10 milliGRAM(s) Oral daily  pantoprazole    Tablet 40 milliGRAM(s) Oral before breakfast  polyethylene glycol 3350 17 Gram(s) Oral <User Schedule>  psyllium Powder 1 Packet(s) Oral every 24 hours  sodium chloride 1 Gram(s) Oral three times a day    MEDICATIONS  (PRN):  artificial  tears Solution 1 Drop(s) Both EYES three times a day PRN Dry Eyes  dextrose 40% Gel 15 Gram(s) Oral once PRN Blood Glucose LESS THAN 70 milliGRAM(s)/deciliter  dextrose 40% Gel 15 Gram(s) Oral once PRN Blood Glucose LESS THAN 70 milliGRAM(s)/deciliter  glucagon  Injectable 1 milliGRAM(s) IntraMuscular once PRN Glucose LESS THAN 70 milligrams/deciliter  glucagon  Injectable 1 milliGRAM(s) IntraMuscular once PRN Glucose LESS THAN 70 milligrams/deciliter        CAPILLARY BLOOD GLUCOSE      POCT Blood Glucose.: 108 mg/dL (14 Oct 2019 08:55)  POCT Blood Glucose.: 127 mg/dL (13 Oct 2019 22:00)  POCT Blood Glucose.: 129 mg/dL (13 Oct 2019 17:23)    I&O's Summary    13 Oct 2019 07:01  -  14 Oct 2019 07:00  --------------------------------------------------------  IN: 60 mL / OUT: 175 mL / NET: -115 mL    14 Oct 2019 07:01  -  14 Oct 2019 12:21  --------------------------------------------------------  IN: 0 mL / OUT: 350 mL / NET: -350 mL        Vital Signs Last 24 Hrs  T(C): 36.8 (14 Oct 2019 09:43), Max: 37.2 (14 Oct 2019 02:15)  T(F): 98.3 (14 Oct 2019 09:43), Max: 99 (14 Oct 2019 02:15)  HR: 61 (14 Oct 2019 09:43) (60 - 68)  BP: 107/50 (14 Oct 2019 09:43) (107/50 - 124/56)  BP(mean): --  RR: 18 (14 Oct 2019 11:30) (16 - 20)  SpO2: 97% (14 Oct 2019 11:30) (93% - 99%)    PHYSICAL EXAM:  GENERAL: NAD, cachectic  HEAD:  Atraumatic, Normocephalic  EYES: EOMI, PERRLA, conjunctiva and sclera clear  NECK: Supple, No JVD  CHEST/LUNG: Clear to auscultation bilaterally; No wheeze  HEART: S1S2; No rubs, or gallops, no murmurs  ABDOMEN: Soft, Nontender, Nondistended; Bowel sounds present  EXTREMITIES:  + Peripheral Pulses, No clubbing or cyanosis, no edema  NEUROLOGY: no change in mild left mild residual hemiparesis  SKIN: No rashes or lesion    LABS:                        8.5    5.12  )-----------( 323      ( 14 Oct 2019 06:10 )             26.6     10-14    136  |  101  |  9   ----------------------------<  103<H>  4.2   |  22  |  0.51    Ca    8.4      14 Oct 2019 06:10  Phos  3.2     10-14  Mg     1.5     10-14                  All consultant(s) notes reviewed and care discussed with other providers        Contact Number, Dr Bishop [phone]

## 2019-10-14 NOTE — DISCHARGE NOTE NURSING/CASE MANAGEMENT/SOCIAL WORK - PATIENT PORTAL LINK FT
You can access the FollowMyHealth Patient Portal offered by [facility] by registering at the following website: [email or web address]. By joining [facility]’s FollowMyHealth portal, you will also be able to view your health information using other applications (apps) compatible with our system.

## 2019-10-14 NOTE — DISCHARGE NOTE NURSING/CASE MANAGEMENT/SOCIAL WORK - NSDCPNINST_GEN_ALL_CORE
Instructed to notify in case of severe pain, nausea and vomiting, redness or discharge at the incision site

## 2019-10-17 ENCOUNTER — APPOINTMENT (OUTPATIENT)
Dept: COLORECTAL SURGERY | Facility: CLINIC | Age: 79
End: 2019-10-17
Payer: MEDICARE

## 2019-10-17 VITALS — HEART RATE: 78 BPM | DIASTOLIC BLOOD PRESSURE: 53 MMHG | TEMPERATURE: 97.7 F | SYSTOLIC BLOOD PRESSURE: 90 MMHG

## 2019-10-17 DIAGNOSIS — Z86.39 PERSONAL HISTORY OF OTHER ENDOCRINE, NUTRITIONAL AND METABOLIC DISEASE: ICD-10-CM

## 2019-10-17 DIAGNOSIS — Z09 ENCOUNTER FOR FOLLOW-UP EXAMINATION AFTER COMPLETED TREATMENT FOR CONDITIONS OTHER THAN MALIGNANT NEOPLASM: ICD-10-CM

## 2019-10-17 DIAGNOSIS — M79.2 NEURALGIA AND NEURITIS, UNSPECIFIED: ICD-10-CM

## 2019-10-17 DIAGNOSIS — I10 ESSENTIAL (PRIMARY) HYPERTENSION: ICD-10-CM

## 2019-10-17 DIAGNOSIS — Z86.59 PERSONAL HISTORY OF OTHER MENTAL AND BEHAVIORAL DISORDERS: ICD-10-CM

## 2019-10-17 DIAGNOSIS — Z87.19 PERSONAL HISTORY OF OTHER DISEASES OF THE DIGESTIVE SYSTEM: ICD-10-CM

## 2019-10-17 DIAGNOSIS — M32.9 SYSTEMIC LUPUS ERYTHEMATOSUS, UNSPECIFIED: ICD-10-CM

## 2019-10-17 DIAGNOSIS — Z87.09 PERSONAL HISTORY OF OTHER DISEASES OF THE RESPIRATORY SYSTEM: ICD-10-CM

## 2019-10-17 DIAGNOSIS — K62.3 RECTAL PROLAPSE: ICD-10-CM

## 2019-10-17 PROCEDURE — 99024 POSTOP FOLLOW-UP VISIT: CPT

## 2019-10-17 RX ORDER — NORMAL SALT TABLETS 1 G/G
1 TABLET ORAL
Refills: 0 | Status: ACTIVE | COMMUNITY

## 2019-10-17 RX ORDER — CHOLECALCIFEROL (VITAMIN D3)
CRYSTALS MISCELLANEOUS
Refills: 0 | Status: ACTIVE | COMMUNITY

## 2019-10-17 RX ORDER — AMLODIPINE BESYLATE AND OLMESARTAN MEDOXOMIL 10; 40 MG/1; MG/1
10-40 TABLET, FILM COATED ORAL
Refills: 0 | Status: ACTIVE | COMMUNITY

## 2019-10-17 RX ORDER — POLYETHYLENE GLYCOL 3350 17 G/17G
17 POWDER, FOR SOLUTION ORAL
Refills: 0 | Status: ACTIVE | COMMUNITY

## 2019-10-17 RX ORDER — FLUTICASONE FUROATE AND VILANTEROL TRIFENATATE 100; 25 UG/1; UG/1
100-25 POWDER RESPIRATORY (INHALATION)
Refills: 0 | Status: ACTIVE | COMMUNITY

## 2019-10-17 RX ORDER — FOLIC ACID 1 MG/1
1 TABLET ORAL
Refills: 0 | Status: ACTIVE | COMMUNITY

## 2019-10-17 RX ORDER — GABAPENTIN 100 MG
100 TABLET ORAL
Refills: 0 | Status: ACTIVE | COMMUNITY

## 2019-10-17 RX ORDER — ACETAMINOPHEN 325 MG/1
325 TABLET ORAL
Refills: 0 | Status: ACTIVE | COMMUNITY

## 2019-10-17 RX ORDER — SERTRALINE HYDROCHLORIDE 100 MG/1
100 TABLET, FILM COATED ORAL
Refills: 0 | Status: ACTIVE | COMMUNITY

## 2019-10-17 RX ORDER — HYDROXYCHLOROQUINE SULFATE 200 MG/1
200 TABLET ORAL
Refills: 0 | Status: ACTIVE | COMMUNITY

## 2019-10-17 RX ORDER — OMEPRAZOLE 20 MG/1
20 CAPSULE, DELAYED RELEASE ORAL
Refills: 0 | Status: ACTIVE | COMMUNITY

## 2019-10-17 RX ORDER — NEBIVOLOL HYDROCHLORIDE 10 MG/1
10 TABLET ORAL
Refills: 0 | Status: ACTIVE | COMMUNITY

## 2019-10-17 NOTE — HISTORY OF PRESENT ILLNESS
[FreeTextEntry1] : Status post rectopexy after patient was admitted with incarcerated rectal prolapse. Patient progressing well. Tolerating diet having bowel movements. Improving pain. No fevers or chills

## 2019-10-17 NOTE — ASSESSMENT
[FreeTextEntry1] : Rectal prolapse\par -Patient progressing well\par -High fiber diet\par -Follow up in 8 weeks for wound check

## 2019-11-26 ENCOUNTER — APPOINTMENT (OUTPATIENT)
Dept: COLORECTAL SURGERY | Facility: CLINIC | Age: 79
End: 2019-11-26

## 2020-07-06 ENCOUNTER — INPATIENT (INPATIENT)
Facility: HOSPITAL | Age: 80
LOS: 2 days | Discharge: NURSING, REHAB OR LONG-TERM CARE | End: 2020-07-09
Attending: INTERNAL MEDICINE | Admitting: INTERNAL MEDICINE
Payer: MEDICARE

## 2020-07-06 ENCOUNTER — TRANSCRIPTION ENCOUNTER (OUTPATIENT)
Age: 80
End: 2020-07-06

## 2020-07-06 VITALS
TEMPERATURE: 98 F | DIASTOLIC BLOOD PRESSURE: 67 MMHG | OXYGEN SATURATION: 100 % | SYSTOLIC BLOOD PRESSURE: 126 MMHG | HEART RATE: 68 BPM | WEIGHT: 89.07 LBS | HEIGHT: 63 IN | RESPIRATION RATE: 16 BRPM

## 2020-07-06 DIAGNOSIS — E11.65 TYPE 2 DIABETES MELLITUS WITH HYPERGLYCEMIA: ICD-10-CM

## 2020-07-06 DIAGNOSIS — S72.002A FRACTURE OF UNSPECIFIED PART OF NECK OF LEFT FEMUR, INITIAL ENCOUNTER FOR CLOSED FRACTURE: ICD-10-CM

## 2020-07-06 DIAGNOSIS — Z98.49 CATARACT EXTRACTION STATUS, UNSPECIFIED EYE: Chronic | ICD-10-CM

## 2020-07-06 DIAGNOSIS — I10 ESSENTIAL (PRIMARY) HYPERTENSION: ICD-10-CM

## 2020-07-06 DIAGNOSIS — M32.9 SYSTEMIC LUPUS ERYTHEMATOSUS, UNSPECIFIED: ICD-10-CM

## 2020-07-06 DIAGNOSIS — E78.00 PURE HYPERCHOLESTEROLEMIA, UNSPECIFIED: ICD-10-CM

## 2020-07-06 DIAGNOSIS — Z29.9 ENCOUNTER FOR PROPHYLACTIC MEASURES, UNSPECIFIED: ICD-10-CM

## 2020-07-06 DIAGNOSIS — F32.9 MAJOR DEPRESSIVE DISORDER, SINGLE EPISODE, UNSPECIFIED: ICD-10-CM

## 2020-07-06 LAB
ALBUMIN SERPL ELPH-MCNC: 3 G/DL — LOW (ref 3.3–5)
ALP SERPL-CCNC: 120 U/L — SIGNIFICANT CHANGE UP (ref 40–120)
ALT FLD-CCNC: 27 U/L — SIGNIFICANT CHANGE UP (ref 12–78)
ANION GAP SERPL CALC-SCNC: 5 MMOL/L — SIGNIFICANT CHANGE UP (ref 5–17)
APTT BLD: 31.9 SEC — SIGNIFICANT CHANGE UP (ref 27.5–35.5)
AST SERPL-CCNC: 31 U/L — SIGNIFICANT CHANGE UP (ref 15–37)
BASOPHILS # BLD AUTO: 0.02 K/UL — SIGNIFICANT CHANGE UP (ref 0–0.2)
BASOPHILS NFR BLD AUTO: 0.6 % — SIGNIFICANT CHANGE UP (ref 0–2)
BILIRUB SERPL-MCNC: 0.3 MG/DL — SIGNIFICANT CHANGE UP (ref 0.2–1.2)
BUN SERPL-MCNC: 18 MG/DL — SIGNIFICANT CHANGE UP (ref 7–23)
CALCIUM SERPL-MCNC: 9 MG/DL — SIGNIFICANT CHANGE UP (ref 8.5–10.1)
CHLORIDE SERPL-SCNC: 105 MMOL/L — SIGNIFICANT CHANGE UP (ref 96–108)
CO2 SERPL-SCNC: 29 MMOL/L — SIGNIFICANT CHANGE UP (ref 22–31)
CREAT SERPL-MCNC: 0.64 MG/DL — SIGNIFICANT CHANGE UP (ref 0.5–1.3)
EOSINOPHIL # BLD AUTO: 0.04 K/UL — SIGNIFICANT CHANGE UP (ref 0–0.5)
EOSINOPHIL NFR BLD AUTO: 1.1 % — SIGNIFICANT CHANGE UP (ref 0–6)
GLUCOSE SERPL-MCNC: 111 MG/DL — HIGH (ref 70–99)
HCT VFR BLD CALC: 30.7 % — LOW (ref 34.5–45)
HGB BLD-MCNC: 9.5 G/DL — LOW (ref 11.5–15.5)
IMM GRANULOCYTES NFR BLD AUTO: 0 % — SIGNIFICANT CHANGE UP (ref 0–1.5)
INR BLD: 1.34 RATIO — HIGH (ref 0.88–1.16)
LYMPHOCYTES # BLD AUTO: 2.17 K/UL — SIGNIFICANT CHANGE UP (ref 1–3.3)
LYMPHOCYTES # BLD AUTO: 60.6 % — HIGH (ref 13–44)
MAGNESIUM SERPL-MCNC: 1.6 MG/DL — SIGNIFICANT CHANGE UP (ref 1.6–2.6)
MCHC RBC-ENTMCNC: 23.3 PG — LOW (ref 27–34)
MCHC RBC-ENTMCNC: 30.9 GM/DL — LOW (ref 32–36)
MCV RBC AUTO: 75.4 FL — LOW (ref 80–100)
MONOCYTES # BLD AUTO: 0.29 K/UL — SIGNIFICANT CHANGE UP (ref 0–0.9)
MONOCYTES NFR BLD AUTO: 8.1 % — SIGNIFICANT CHANGE UP (ref 2–14)
NEUTROPHILS # BLD AUTO: 1.06 K/UL — LOW (ref 1.8–7.4)
NEUTROPHILS NFR BLD AUTO: 29.6 % — LOW (ref 43–77)
NRBC # BLD: 0 /100 WBCS — SIGNIFICANT CHANGE UP (ref 0–0)
PHOSPHATE SERPL-MCNC: 3.6 MG/DL — SIGNIFICANT CHANGE UP (ref 2.5–4.5)
PLATELET # BLD AUTO: 193 K/UL — SIGNIFICANT CHANGE UP (ref 150–400)
POTASSIUM SERPL-MCNC: 3.7 MMOL/L — SIGNIFICANT CHANGE UP (ref 3.5–5.3)
POTASSIUM SERPL-SCNC: 3.7 MMOL/L — SIGNIFICANT CHANGE UP (ref 3.5–5.3)
PROT SERPL-MCNC: 8.3 GM/DL — SIGNIFICANT CHANGE UP (ref 6–8.3)
PROTHROM AB SERPL-ACNC: 14.7 SEC — HIGH (ref 10.6–13.6)
RBC # BLD: 4.07 M/UL — SIGNIFICANT CHANGE UP (ref 3.8–5.2)
RBC # FLD: 17.7 % — HIGH (ref 10.3–14.5)
SODIUM SERPL-SCNC: 139 MMOL/L — SIGNIFICANT CHANGE UP (ref 135–145)
WBC # BLD: 3.58 K/UL — LOW (ref 3.8–10.5)
WBC # FLD AUTO: 3.58 K/UL — LOW (ref 3.8–10.5)

## 2020-07-06 PROCEDURE — 93010 ELECTROCARDIOGRAM REPORT: CPT

## 2020-07-06 PROCEDURE — 99223 1ST HOSP IP/OBS HIGH 75: CPT | Mod: AI

## 2020-07-06 PROCEDURE — 73552 X-RAY EXAM OF FEMUR 2/>: CPT | Mod: 26,LT

## 2020-07-06 PROCEDURE — 71045 X-RAY EXAM CHEST 1 VIEW: CPT | Mod: 26

## 2020-07-06 PROCEDURE — 99285 EMERGENCY DEPT VISIT HI MDM: CPT

## 2020-07-06 PROCEDURE — 73502 X-RAY EXAM HIP UNI 2-3 VIEWS: CPT | Mod: 26,LT

## 2020-07-06 RX ORDER — ACETAMINOPHEN 500 MG
650 TABLET ORAL ONCE
Refills: 0 | Status: COMPLETED | OUTPATIENT
Start: 2020-07-06 | End: 2020-07-06

## 2020-07-06 RX ORDER — GABAPENTIN 400 MG/1
100 CAPSULE ORAL
Refills: 0 | Status: DISCONTINUED | OUTPATIENT
Start: 2020-07-06 | End: 2020-07-07

## 2020-07-06 RX ORDER — BUDESONIDE AND FORMOTEROL FUMARATE DIHYDRATE 160; 4.5 UG/1; UG/1
2 AEROSOL RESPIRATORY (INHALATION)
Refills: 0 | Status: DISCONTINUED | OUTPATIENT
Start: 2020-07-06 | End: 2020-07-07

## 2020-07-06 RX ORDER — FOLIC ACID 0.8 MG
1 TABLET ORAL DAILY
Refills: 0 | Status: DISCONTINUED | OUTPATIENT
Start: 2020-07-06 | End: 2020-07-07

## 2020-07-06 RX ORDER — HEPARIN SODIUM 5000 [USP'U]/ML
5000 INJECTION INTRAVENOUS; SUBCUTANEOUS EVERY 8 HOURS
Refills: 0 | Status: DISCONTINUED | OUTPATIENT
Start: 2020-07-06 | End: 2020-07-06

## 2020-07-06 RX ORDER — SODIUM CHLORIDE 9 MG/ML
500 INJECTION INTRAMUSCULAR; INTRAVENOUS; SUBCUTANEOUS ONCE
Refills: 0 | Status: COMPLETED | OUTPATIENT
Start: 2020-07-06 | End: 2020-07-06

## 2020-07-06 RX ORDER — SODIUM CHLORIDE 9 MG/ML
1000 INJECTION, SOLUTION INTRAVENOUS
Refills: 0 | Status: DISCONTINUED | OUTPATIENT
Start: 2020-07-06 | End: 2020-07-07

## 2020-07-06 RX ORDER — ATENOLOL 25 MG/1
100 TABLET ORAL DAILY
Refills: 0 | Status: DISCONTINUED | OUTPATIENT
Start: 2020-07-06 | End: 2020-07-07

## 2020-07-06 RX ORDER — PANTOPRAZOLE SODIUM 20 MG/1
40 TABLET, DELAYED RELEASE ORAL
Refills: 0 | Status: DISCONTINUED | OUTPATIENT
Start: 2020-07-06 | End: 2020-07-07

## 2020-07-06 RX ORDER — SERTRALINE 25 MG/1
100 TABLET, FILM COATED ORAL DAILY
Refills: 0 | Status: DISCONTINUED | OUTPATIENT
Start: 2020-07-06 | End: 2020-07-07

## 2020-07-06 RX ORDER — HYDROXYCHLOROQUINE SULFATE 200 MG
200 TABLET ORAL DAILY
Refills: 0 | Status: DISCONTINUED | OUTPATIENT
Start: 2020-07-06 | End: 2020-07-07

## 2020-07-06 RX ADMIN — SODIUM CHLORIDE 500 MILLILITER(S): 9 INJECTION INTRAMUSCULAR; INTRAVENOUS; SUBCUTANEOUS at 20:51

## 2020-07-06 RX ADMIN — Medication 650 MILLIGRAM(S): at 19:52

## 2020-07-06 RX ADMIN — SODIUM CHLORIDE 500 MILLILITER(S): 9 INJECTION INTRAMUSCULAR; INTRAVENOUS; SUBCUTANEOUS at 19:51

## 2020-07-06 NOTE — ED PROVIDER NOTE - CLINICAL SUMMARY MEDICAL DECISION MAKING FREE TEXT BOX
80 yo F presenting for L hip pain, shortened and externally rotated, likely fractured. labs, xr, analgesia, tba ortho/medicine

## 2020-07-06 NOTE — ED ADULT NURSE NOTE - NSIMPLEMENTINTERV_GEN_ALL_ED
Implemented All Fall with Harm Risk Interventions:  [place] to call system. Call bell, personal items and telephone within reach. Instruct patient to call for assistance. Room bathroom lighting operational. Non-slip footwear when patient is off stretcher. Physically safe environment: no spills, clutter or unnecessary equipment. Stretcher in lowest position, wheels locked, appropriate side rails in place. Provide visual cue, wrist band, yellow gown, etc. Monitor gait and stability. Monitor for mental status changes and reorient to person, place, and time. Review medications for side effects contributing to fall risk. Reinforce activity limits and safety measures with patient and family. Provide visual clues: red socks.

## 2020-07-06 NOTE — ED PROVIDER NOTE - NS ED ROS FT
CONST: no fevers, no chills, no trauma  EYES: no pain, no visual disturbances  ENT: no sore throat, no epistaxis, no rhinorrhea, no hearing changes  CV: no chest pain, no palpitations, no orthopnea, no extremity pain or swelling  RESP: no shortness of breath, no cough, no sputum, no pleurisy, no wheezing  ABD: no abdominal pain, no nausea, no vomiting, no diarrhea, no black or bloody stool  : no dysuria, no hematuria, no frequency, no urgency  MSK: no back pain, no neck pain, + extremity pain  NEURO: no headache, no sensory disturbances, no focal weakness, no dizziness  HEME: no easy bleeding or bruising  SKIN: no diaphoresis, no rash

## 2020-07-06 NOTE — ED ADULT NURSE REASSESSMENT NOTE - NS ED NURSE REASSESS COMMENT FT1
Report given to JANNET Zayas V ,  at change of shift. Review of patients reason for hospital visit and emergency department care reviewed and history of patient discussed. Pt is in no notable distress at this time.

## 2020-07-06 NOTE — H&P ADULT - NSHPPHYSICALEXAM_GEN_ALL_CORE
Vital Signs Last 24 Hrs  T(C): 36.7 (06 Jul 2020 15:00), Max: 36.7 (06 Jul 2020 15:00)  T(F): 98 (06 Jul 2020 15:00), Max: 98 (06 Jul 2020 15:00)  HR: 68 (06 Jul 2020 15:00) (68 - 68)  BP: 126/67 (06 Jul 2020 15:00) (126/67 - 126/67)  BP(mean): --  RR: 16 (06 Jul 2020 15:00) (16 - 16)  SpO2: 100% (06 Jul 2020 15:00) (100% - 100%)    PHYSICAL EXAM:    GENERAL: NAD, well-groomed, elderly cachectic female  HEAD:  Atraumatic, Normocephalic  EYES: EOMI, PERRLA, conjunctiva and sclera clear  ENMT: No tonsillar erythema, exudates, or enlargement; Moist mucous membranes, No lesions  NECK: Supple, No JVD, Normal thyroid  NERVOUS SYSTEM:  Alert soft spoken, unclear if understands but does have known expressive aphasia moves upper extremity and points to left hip for pain  CHEST/LUNG: Clear to percussion bilaterally; No rales, rhonchi, wheezing, or rubs  HEART: Regular rate and rhythm; No rubs, or gallops, +S1,S2  ABDOMEN: Soft, Nontender, Nondistended; Bowel sounds present  EXTREMITIES:  2+ Peripheral Pulses, No clubbing, cyanosis, or edema  LYMPH: No cervical adenopathy  RECTAL: deferred  BREAST: deferred  : deferred  SKIN: No rashes or lesions    IMPROVE VTE Individual Risk Assessment        RISK                                                          Points  [  ] Previous VTE                                                3  [  ] Thrombophilia                                             2  [  ] Lower limb paralysis                                    2        (unable to hold up >15 seconds)    [  ] Current Cancer                                             2         (within 6 months)  [ x ] Immobilization > 24 hrs                              1  [  ] ICU/CCU stay > 24 hours                            1  [  x] Age > 60                                                    1  IMPROVE VTE Score ___2______

## 2020-07-06 NOTE — ED PROVIDER NOTE - PSH
H/O: Hysterectomy    Status post cataract extraction and insertion of intraocular lens, unspecified laterality

## 2020-07-06 NOTE — ED ADULT NURSE NOTE - OBJECTIVE STATEMENT
Pt is an 79YOF who is here with pain in her left hip, pt was diagnosed with a fracture on outpatient imaging, pt states that she lives at home with a 24 hour aid, pt states she fell back in february and was found to have a fracture but did not seek any treatment, pt has been complaining of pain since then as per the son. Pt denies any fevers or chills, denies any nausea or vomiting, pt states she has not been able to hear correctly.

## 2020-07-06 NOTE — H&P ADULT - ASSESSMENT
80 y/o female w/pmhx of SLE, HTN, HLD, DM2, CVA with residual expressive aphasia,  and Depression c/o left hip pain and hip fx.  pt is currently medically optimized to proceed with surgery, would hold arb to avoid perioperative hypotension

## 2020-07-06 NOTE — H&P ADULT - NSHPLABSRESULTS_GEN_ALL_CORE
LABS:                        9.5    3.58  )-----------( 193      ( 06 Jul 2020 18:20 )             30.7     07-06    139  |  105  |  18  ----------------------------<  111<H>  3.7   |  29  |  0.64    Ca    9.0      06 Jul 2020 18:20  Phos  3.6     07-06  Mg     1.6     07-06    TPro  8.3  /  Alb  3.0<L>  /  TBili  0.3  /  DBili  x   /  AST  31  /  ALT  27  /  AlkPhos  120  07-06    PT/INR - ( 06 Jul 2020 21:46 )   PT: 14.7 sec;   INR: 1.34 ratio         PTT - ( 06 Jul 2020 21:46 )  PTT:31.9 sec    CAPILLARY BLOOD GLUCOSE            RADIOLOGY & ADDITIONAL TESTS:    Imaging Personally Reviewed:  [ X] YES  [ ] NO

## 2020-07-06 NOTE — ED ADULT TRIAGE NOTE - CHIEF COMPLAINT QUOTE
Left hip pain x 2 months, fell 2 months ago, appears thin and not able to hear, hearing aide not functioning, Sent by Dr Sepulveda for evaluation

## 2020-07-06 NOTE — CONSULT NOTE ADULT - SUBJECTIVE AND OBJECTIVE BOX
Ms. Galaviz is an 79 y F, with a past medical history significant for CVA with residual expressive aphasia, SLE, HTN, HLD, OP, DM2, and Depression c/o left hip pain. Ms. Ross is a patient of Dr. Sepulveda who saw her in the outpatient setting this afternoon for left hip pain. Upon discovering the presence of a left hip fracture confirmed via imaging, she was instructed to come to the Emergency Department. Her son Aziza is present in the emergency department with her and is communicating on her behalf as she is a poor historian given hearing difficulties and residual CVA effects. The son states she suffered an unwitnessed fall back in February and failed to seek treatment for it. He claims she has been having progressively worsening left groin pain since mid April and increased difficulty ambulating with her walker. She denies any other falls. The patient lives alone with 24 hour home care and normally ambulates with a walker, however she is unable to ambulate at this point. She denies fevers/chills. Denies weakness/parathesias. Denies other injuries or pain.     PAST MEDICAL & SURGICAL HISTORY:  Diastolic heart failure  Acute Ischemic Right KENY Stroke  History of Leukemia  H/O: Hypertension  Anemia  Lupus  Hypercholesteremia  Diabetes  Depression  Status post cataract extraction and insertion of intraocular lens, unspecified laterality  H/O: Hysterectomy    Allergies    SULFA (Unknown)  sulfa drugs (Unknown)    Intolerances    Vital Signs Last 24 Hrs  T(C): 36.7 (06 Jul 2020 15:00), Max: 36.7 (06 Jul 2020 15:00)  T(F): 98 (06 Jul 2020 15:00), Max: 98 (06 Jul 2020 15:00)  HR: 68 (06 Jul 2020 15:00) (68 - 68)  BP: 126/67 (06 Jul 2020 15:00) (126/67 - 126/67)  BP(mean): --  RR: 16 (06 Jul 2020 15:00) (16 - 16)  SpO2: 100% (06 Jul 2020 15:00) (100% - 100%)                          9.5    3.58  )-----------( 193      ( 06 Jul 2020 18:20 )             30.7     07-06    139  |  105  |  18  ----------------------------<  111<H>  3.7   |  29  |  0.64    Ca    9.0      06 Jul 2020 18:20  Phos  3.6     07-06  Mg     1.6     07-06    TPro  8.3  /  Alb  3.0<L>  /  TBili  0.3  /  DBili  x   /  AST  31  /  ALT  27  /  AlkPhos  120  07-06    Physical Exam:  General: In mild distress, AOx3, difficulty communicating 2/2 CVA and hearing loss  LE:  - TTP over left hip and groin  - Left leg shortened and externally rotated   - ROM limited 2/2 to pain in LLE  - SLIT b/l LE  - + EHL/TA/FHL/GSC  - Skin intact throughout     Secondary Survey: negative, motor grossly intact, skin intact, SLIT throughout    Imaging:    Assessment   Patient is an 78 yo F c/o left hip pain secondary to left hip fracture    Plan:  - pain control as needed  - admit to medicine for medical management   - F/U labs, pre-operative clearance  - NPO after midnight  - Discuss case with attending and change plan as needed  - Keep son informed on timing of surgery Ms. Galaviz is an 79 y F, with a past medical history significant for CVA with residual expressive aphasia, SLE, HTN, HLD, OP, DM2, and Depression c/o left hip pain. Ms. Ross is a patient of Dr. Sepulveda who saw her in the outpatient setting this afternoon for left hip pain. Upon discovering the presence of a left hip fracture confirmed via imaging, she was instructed to come to the Emergency Department. Her family friend Aziza is present in the emergency department with her and is communicating on her behalf as she is a poor historian given hearing difficulties and residual CVA effects. he states she suffered an unwitnessed fall back in February and failed to seek treatment for it. He claims she has been having progressively worsening left groin pain since mid April and increased difficulty ambulating with her walker. She denies any other falls. The patient lives alone with 24 hour home care and normally ambulates with a walker, however she is unable to ambulate at this point. She denies fevers/chills. Denies weakness/parathesias. Denies other injuries or pain.     PAST MEDICAL & SURGICAL HISTORY:  Diastolic heart failure  Acute Ischemic Right KENY Stroke  History of Leukemia  H/O: Hypertension  Anemia  Lupus  Hypercholesteremia  Diabetes  Depression  Status post cataract extraction and insertion of intraocular lens, unspecified laterality  H/O: Hysterectomy    Allergies    SULFA (Unknown)  sulfa drugs (Unknown)    Intolerances    Vital Signs Last 24 Hrs  T(C): 36.7 (06 Jul 2020 15:00), Max: 36.7 (06 Jul 2020 15:00)  T(F): 98 (06 Jul 2020 15:00), Max: 98 (06 Jul 2020 15:00)  HR: 68 (06 Jul 2020 15:00) (68 - 68)  BP: 126/67 (06 Jul 2020 15:00) (126/67 - 126/67)  BP(mean): --  RR: 16 (06 Jul 2020 15:00) (16 - 16)  SpO2: 100% (06 Jul 2020 15:00) (100% - 100%)                          9.5    3.58  )-----------( 193      ( 06 Jul 2020 18:20 )             30.7     07-06    139  |  105  |  18  ----------------------------<  111<H>  3.7   |  29  |  0.64    Ca    9.0      06 Jul 2020 18:20  Phos  3.6     07-06  Mg     1.6     07-06    TPro  8.3  /  Alb  3.0<L>  /  TBili  0.3  /  DBili  x   /  AST  31  /  ALT  27  /  AlkPhos  120  07-06    PT/INR - ( 06 Jul 2020 21:46 )   PT: 14.7 sec;   INR: 1.34 ratio         PTT - ( 06 Jul 2020 21:46 )  PTT:31.9 sec    Physical Exam:  General: In no distress, AO x2, difficulty communicating 2/2 CVA and hearing loss  LE:  - TTP over left hip and groin  - Left leg shortened and externally rotated   - ROM limited 2/2 to pain in LLE  - SLIT b/l LE  - + EHL/TA/FHL/GSC  - +dp/pt, foot warm  - Skin intact throughout  - compartments soft, no calf ttp    Secondary Survey: negative, motor grossly intact, skin intact, SLIT throughout, no pain to palpation over any other bony prominences    Imaging:    xr pelvis and hip demonstrates displaced femoral neck fracture    Assessment   Patient is an 78 yo F c/o left hip pain secondary to left hip fracture    Plan:  - pain control as needed  - admit to medicine for medical management, comgmt appreciated  - F/U labs, pre-operative clearance  - NPO after midnight except meds  -iv fluids while npo  -hold chemical AC after midnight  - attending aware of and agrees with above plan

## 2020-07-06 NOTE — CHART NOTE - NSCHARTNOTEFT_GEN_A_CORE
Orthopedic Pre Op Note    PreOp Dx: left hip fx  Procedure: left hip hemiarthroplasty  Surgeon: nia                        9.5    3.58  )-----------( 193      ( 06 Jul 2020 18:20 )             30.7     06 Jul 2020 18:20    139    |  105    |  18     ----------------------------<  111    3.7     |  29     |  0.64     Ca    9.0        06 Jul 2020 18:20  Phos  3.6       06 Jul 2020 18:20  Mg     1.6       06 Jul 2020 18:20    TPro  8.3    /  Alb  3.0    /  TBili  0.3    /  DBili  x      /  AST  31     /  ALT  27     /  AlkPhos  120    06 Jul 2020 18:20      Labs: done  CXR: pending  EKG: pending  UA: pending  T&S: pending  Clearance: pending (med aware)  Consent: done      Plan: 79y Female w/ left hip fx    -NPO after midnight except meds  -IVF while NPO  -hold all anticoagulation after midnight  - medicla optimization  -Plan for OR 7/7/20

## 2020-07-06 NOTE — ED PROVIDER NOTE - OBJECTIVE STATEMENT
78 yo F hx CVA with expressive aphasia, lupus on long term prednisone, HTN, HLD, osteoporosis, DM2, gout, depression, rectal prolapse, sent in by doctor for chronic L hip pain, found to have fracture on outpatient imaging. Patient is accompanied by son, who states patient lives alone with 24 hour HHA, and had a fall back in February, which she did not seek care for. Since that time, she has been complaining of pain and has been unable to ambulate. Denies cp/sob, fevers. No abdominal pain. No dysuria.

## 2020-07-06 NOTE — H&P ADULT - HISTORY OF PRESENT ILLNESS
Pt is a 80 y/o female w/pmhx of SLE, HTN, HLD,  DM2, CVA with residual expressive aphasia,  and Depression c/o left hip pain.  Son at bedside, states she had fell back in Feb. and has been mostly in bed and saw Dr. Sepulveda in office today and found to have hip fx and sent ed.   Son states the fall was unwitnessed and pt been needing help after, sx's werent improving and getting worse w/pain and no longer able to ambulate w/her walker which is why saw ortho today.  pt w/o any prior cad, no sob, no fever, chills, cp, palpitations, n/v/d/c. pt lives by herself has HHA.

## 2020-07-07 ENCOUNTER — TRANSCRIPTION ENCOUNTER (OUTPATIENT)
Age: 80
End: 2020-07-07

## 2020-07-07 ENCOUNTER — RESULT REVIEW (OUTPATIENT)
Age: 80
End: 2020-07-07

## 2020-07-07 LAB
ALBUMIN SERPL ELPH-MCNC: 3.1 G/DL — LOW (ref 3.3–5)
ALP SERPL-CCNC: 121 U/L — HIGH (ref 40–120)
ALT FLD-CCNC: 22 U/L — SIGNIFICANT CHANGE UP (ref 12–78)
ANION GAP SERPL CALC-SCNC: 4 MMOL/L — LOW (ref 5–17)
ANION GAP SERPL CALC-SCNC: 6 MMOL/L — SIGNIFICANT CHANGE UP (ref 5–17)
APTT BLD: 32.9 SEC — SIGNIFICANT CHANGE UP (ref 27.5–35.5)
AST SERPL-CCNC: 22 U/L — SIGNIFICANT CHANGE UP (ref 15–37)
BILIRUB SERPL-MCNC: 0.3 MG/DL — SIGNIFICANT CHANGE UP (ref 0.2–1.2)
BUN SERPL-MCNC: 15 MG/DL — SIGNIFICANT CHANGE UP (ref 7–23)
BUN SERPL-MCNC: 20 MG/DL — SIGNIFICANT CHANGE UP (ref 7–23)
CALCIUM SERPL-MCNC: 8.8 MG/DL — SIGNIFICANT CHANGE UP (ref 8.5–10.1)
CALCIUM SERPL-MCNC: 8.8 MG/DL — SIGNIFICANT CHANGE UP (ref 8.5–10.1)
CHLORIDE SERPL-SCNC: 104 MMOL/L — SIGNIFICANT CHANGE UP (ref 96–108)
CHLORIDE SERPL-SCNC: 106 MMOL/L — SIGNIFICANT CHANGE UP (ref 96–108)
CO2 SERPL-SCNC: 25 MMOL/L — SIGNIFICANT CHANGE UP (ref 22–31)
CO2 SERPL-SCNC: 29 MMOL/L — SIGNIFICANT CHANGE UP (ref 22–31)
CREAT SERPL-MCNC: 0.47 MG/DL — LOW (ref 0.5–1.3)
CREAT SERPL-MCNC: 0.64 MG/DL — SIGNIFICANT CHANGE UP (ref 0.5–1.3)
GLUCOSE BLDC GLUCOMTR-MCNC: 110 MG/DL — HIGH (ref 70–99)
GLUCOSE BLDC GLUCOMTR-MCNC: 88 MG/DL — SIGNIFICANT CHANGE UP (ref 70–99)
GLUCOSE SERPL-MCNC: 87 MG/DL — SIGNIFICANT CHANGE UP (ref 70–99)
GLUCOSE SERPL-MCNC: 97 MG/DL — SIGNIFICANT CHANGE UP (ref 70–99)
HCT VFR BLD CALC: 26.4 % — LOW (ref 34.5–45)
HCT VFR BLD CALC: 29.7 % — LOW (ref 34.5–45)
HCT VFR BLD CALC: 31.7 % — LOW (ref 34.5–45)
HGB BLD-MCNC: 8.1 G/DL — LOW (ref 11.5–15.5)
HGB BLD-MCNC: 8.9 G/DL — LOW (ref 11.5–15.5)
HGB BLD-MCNC: 9.7 G/DL — LOW (ref 11.5–15.5)
INR BLD: 1.25 RATIO — HIGH (ref 0.88–1.16)
MCHC RBC-ENTMCNC: 23.3 PG — LOW (ref 27–34)
MCHC RBC-ENTMCNC: 23.4 PG — LOW (ref 27–34)
MCHC RBC-ENTMCNC: 23.8 PG — LOW (ref 27–34)
MCHC RBC-ENTMCNC: 30 GM/DL — LOW (ref 32–36)
MCHC RBC-ENTMCNC: 30.6 GM/DL — LOW (ref 32–36)
MCHC RBC-ENTMCNC: 30.7 GM/DL — LOW (ref 32–36)
MCV RBC AUTO: 76 FL — LOW (ref 80–100)
MCV RBC AUTO: 77.6 FL — LOW (ref 80–100)
MCV RBC AUTO: 78 FL — LOW (ref 80–100)
NRBC # BLD: 0 /100 WBCS — SIGNIFICANT CHANGE UP (ref 0–0)
PLATELET # BLD AUTO: 170 K/UL — SIGNIFICANT CHANGE UP (ref 150–400)
PLATELET # BLD AUTO: 184 K/UL — SIGNIFICANT CHANGE UP (ref 150–400)
PLATELET # BLD AUTO: 197 K/UL — SIGNIFICANT CHANGE UP (ref 150–400)
POTASSIUM SERPL-MCNC: 3 MMOL/L — LOW (ref 3.5–5.3)
POTASSIUM SERPL-MCNC: 4.2 MMOL/L — SIGNIFICANT CHANGE UP (ref 3.5–5.3)
POTASSIUM SERPL-SCNC: 3 MMOL/L — LOW (ref 3.5–5.3)
POTASSIUM SERPL-SCNC: 4.2 MMOL/L — SIGNIFICANT CHANGE UP (ref 3.5–5.3)
PROT SERPL-MCNC: 8.7 GM/DL — HIGH (ref 6–8.3)
PROTHROM AB SERPL-ACNC: 13.7 SEC — HIGH (ref 10.6–13.6)
RBC # BLD: 3.4 M/UL — LOW (ref 3.8–5.2)
RBC # BLD: 3.81 M/UL — SIGNIFICANT CHANGE UP (ref 3.8–5.2)
RBC # BLD: 4.17 M/UL — SIGNIFICANT CHANGE UP (ref 3.8–5.2)
RBC # FLD: 17.5 % — HIGH (ref 10.3–14.5)
RBC # FLD: 17.7 % — HIGH (ref 10.3–14.5)
RBC # FLD: 17.8 % — HIGH (ref 10.3–14.5)
SARS-COV-2 IGG SERPL QL IA: NEGATIVE — SIGNIFICANT CHANGE UP
SARS-COV-2 IGM SERPL IA-ACNC: 8.74 AU/ML — SIGNIFICANT CHANGE UP
SARS-COV-2 RNA SPEC QL NAA+PROBE: SIGNIFICANT CHANGE UP
SODIUM SERPL-SCNC: 137 MMOL/L — SIGNIFICANT CHANGE UP (ref 135–145)
SODIUM SERPL-SCNC: 137 MMOL/L — SIGNIFICANT CHANGE UP (ref 135–145)
WBC # BLD: 3.2 K/UL — LOW (ref 3.8–10.5)
WBC # BLD: 6.83 K/UL — SIGNIFICANT CHANGE UP (ref 3.8–10.5)
WBC # BLD: 8.48 K/UL — SIGNIFICANT CHANGE UP (ref 3.8–10.5)
WBC # FLD AUTO: 3.2 K/UL — LOW (ref 3.8–10.5)
WBC # FLD AUTO: 6.83 K/UL — SIGNIFICANT CHANGE UP (ref 3.8–10.5)
WBC # FLD AUTO: 8.48 K/UL — SIGNIFICANT CHANGE UP (ref 3.8–10.5)

## 2020-07-07 PROCEDURE — 88305 TISSUE EXAM BY PATHOLOGIST: CPT | Mod: 26

## 2020-07-07 PROCEDURE — 88311 DECALCIFY TISSUE: CPT | Mod: 26

## 2020-07-07 PROCEDURE — 72170 X-RAY EXAM OF PELVIS: CPT | Mod: 26

## 2020-07-07 PROCEDURE — 99233 SBSQ HOSP IP/OBS HIGH 50: CPT

## 2020-07-07 RX ORDER — DEXTROSE 50 % IN WATER 50 %
15 SYRINGE (ML) INTRAVENOUS ONCE
Refills: 0 | Status: DISCONTINUED | OUTPATIENT
Start: 2020-07-08 | End: 2020-07-09

## 2020-07-07 RX ORDER — ACETAMINOPHEN 500 MG
500 TABLET ORAL ONCE
Refills: 0 | Status: DISCONTINUED | OUTPATIENT
Start: 2020-07-07 | End: 2020-07-07

## 2020-07-07 RX ORDER — SODIUM CHLORIDE 9 MG/ML
1000 INJECTION, SOLUTION INTRAVENOUS
Refills: 0 | Status: DISCONTINUED | OUTPATIENT
Start: 2020-07-08 | End: 2020-07-09

## 2020-07-07 RX ORDER — DEXTROSE 50 % IN WATER 50 %
12.5 SYRINGE (ML) INTRAVENOUS ONCE
Refills: 0 | Status: DISCONTINUED | OUTPATIENT
Start: 2020-07-08 | End: 2020-07-09

## 2020-07-07 RX ORDER — MORPHINE SULFATE 50 MG/1
2 CAPSULE, EXTENDED RELEASE ORAL EVERY 4 HOURS
Refills: 0 | Status: DISCONTINUED | OUTPATIENT
Start: 2020-07-07 | End: 2020-07-07

## 2020-07-07 RX ORDER — SODIUM CHLORIDE 9 MG/ML
1000 INJECTION, SOLUTION INTRAVENOUS
Refills: 0 | Status: DISCONTINUED | OUTPATIENT
Start: 2020-07-07 | End: 2020-07-07

## 2020-07-07 RX ORDER — DEXTROSE 50 % IN WATER 50 %
25 SYRINGE (ML) INTRAVENOUS ONCE
Refills: 0 | Status: DISCONTINUED | OUTPATIENT
Start: 2020-07-08 | End: 2020-07-09

## 2020-07-07 RX ORDER — POTASSIUM CHLORIDE 20 MEQ
10 PACKET (EA) ORAL
Refills: 0 | Status: COMPLETED | OUTPATIENT
Start: 2020-07-07 | End: 2020-07-07

## 2020-07-07 RX ORDER — GLUCAGON INJECTION, SOLUTION 0.5 MG/.1ML
1 INJECTION, SOLUTION SUBCUTANEOUS ONCE
Refills: 0 | Status: DISCONTINUED | OUTPATIENT
Start: 2020-07-08 | End: 2020-07-09

## 2020-07-07 RX ORDER — CEFAZOLIN SODIUM 1 G
2000 VIAL (EA) INJECTION EVERY 8 HOURS
Refills: 0 | Status: COMPLETED | OUTPATIENT
Start: 2020-07-07 | End: 2020-07-08

## 2020-07-07 RX ORDER — MORPHINE SULFATE 50 MG/1
2 CAPSULE, EXTENDED RELEASE ORAL
Refills: 0 | Status: DISCONTINUED | OUTPATIENT
Start: 2020-07-07 | End: 2020-07-07

## 2020-07-07 RX ORDER — MORPHINE SULFATE 50 MG/1
4 CAPSULE, EXTENDED RELEASE ORAL
Refills: 0 | Status: DISCONTINUED | OUTPATIENT
Start: 2020-07-07 | End: 2020-07-07

## 2020-07-07 RX ORDER — POTASSIUM CHLORIDE 20 MEQ
40 PACKET (EA) ORAL ONCE
Refills: 0 | Status: COMPLETED | OUTPATIENT
Start: 2020-07-07 | End: 2020-07-07

## 2020-07-07 RX ADMIN — BUDESONIDE AND FORMOTEROL FUMARATE DIHYDRATE 2 PUFF(S): 160; 4.5 AEROSOL RESPIRATORY (INHALATION) at 06:32

## 2020-07-07 RX ADMIN — SERTRALINE 100 MILLIGRAM(S): 25 TABLET, FILM COATED ORAL at 12:20

## 2020-07-07 RX ADMIN — Medication 100 MILLIEQUIVALENT(S): at 11:12

## 2020-07-07 RX ADMIN — Medication 200 MILLIGRAM(S): at 12:21

## 2020-07-07 RX ADMIN — Medication 100 MILLIEQUIVALENT(S): at 13:22

## 2020-07-07 RX ADMIN — Medication 100 MILLIEQUIVALENT(S): at 12:06

## 2020-07-07 RX ADMIN — ATENOLOL 100 MILLIGRAM(S): 25 TABLET ORAL at 06:32

## 2020-07-07 RX ADMIN — PANTOPRAZOLE SODIUM 40 MILLIGRAM(S): 20 TABLET, DELAYED RELEASE ORAL at 06:33

## 2020-07-07 RX ADMIN — SODIUM CHLORIDE 75 MILLILITER(S): 9 INJECTION, SOLUTION INTRAVENOUS at 00:41

## 2020-07-07 RX ADMIN — SODIUM CHLORIDE 75 MILLILITER(S): 9 INJECTION, SOLUTION INTRAVENOUS at 20:22

## 2020-07-07 RX ADMIN — Medication 1 MILLIGRAM(S): at 12:06

## 2020-07-07 RX ADMIN — GABAPENTIN 100 MILLIGRAM(S): 400 CAPSULE ORAL at 06:33

## 2020-07-07 RX ADMIN — Medication 40 MILLIEQUIVALENT(S): at 08:19

## 2020-07-07 RX ADMIN — MORPHINE SULFATE 2 MILLIGRAM(S): 50 CAPSULE, EXTENDED RELEASE ORAL at 00:41

## 2020-07-07 NOTE — DISCHARGE NOTE PROVIDER - NSDCMRMEDTOKEN_GEN_ALL_CORE_FT
acetaminophen 325 mg oral tablet: 2 tab(s) orally every 6 hours  Advair Diskus 100 mcg-50 mcg inhalation powder: 1 puff(s) inhaled 2 times a day  amLODIPine 5 mg oral tablet: 1 tab(s) orally once a day  ATENOLOL 100 MG TABLET:   Palak 10 mg-40 mg oral tablet: 1 tab(s) orally once a day  Bystolic 10 mg oral tablet: 1 tab(s) orally once a day  cholecalciferol oral tablet: 400 unit(s) orally once a day  folic acid 1 mg oral tablet: 1 tab(s) orally once a day  gabapentin 100 mg oral capsule: 1 cap(s) orally 2 times a day  losartan 100 mg oral tablet: 1 tab(s) orally once a day  ocular lubricant ophthalmic solution: 1 drop(s) to each affected eye 3 times a day, As needed, Dry Eyes  omeprazole 20 mg oral delayed release capsule: 1 cap(s) orally once a day  Plaquenil 200 mg oral tablet: 1 tab(s) orally 2 times a day  polyethylene glycol 3350 oral powder for reconstitution: 17 gram(s) orally once a day  psyllium 3.4 g/7 g oral powder for reconstitution: 3.4 gram(s) orally once a day  sodium chloride 1 g oral tablet: 1 tab(s) orally 3 times a day  Zoloft 100 mg oral tablet: 1 tab(s) orally once a day acetaminophen 325 mg oral tablet: 2 tab(s) orally every 6 hours  Advair Diskus 100 mcg-50 mcg inhalation powder: 1 puff(s) inhaled 2 times a day  ascorbic acid 500 mg oral tablet: 1 tab(s) orally 2 times a day  ATENOLOL 100 MG TABLET:   cholecalciferol oral tablet: 400 unit(s) orally once a day  folic acid 1 mg oral tablet: 1 tab(s) orally once a day  gabapentin 100 mg oral capsule: 1 cap(s) orally 2 times a day  losartan 100 mg oral tablet: 1 tab(s) orally once a day  ocular lubricant ophthalmic solution: 1 drop(s) to each affected eye 3 times a day, As needed, Dry Eyes  omeprazole 20 mg oral delayed release capsule: 1 cap(s) orally once a day  oxyCODONE 5 mg oral tablet: 1 tab(s) orally every 4 hours, As needed, Mild Pain (1 - 3)  Plaquenil 200 mg oral tablet: 1 tab(s) orally 2 times a day  polyethylene glycol 3350 oral powder for reconstitution: 17 gram(s) orally once a day As Needed - for constipation  psyllium 3.4 g/7 g oral powder for reconstitution: 3.4 gram(s) orally once a day, As Needed - for constipation  rivaroxaban 10 mg oral tablet: 1 tab(s) orally once a day x 34 days  sodium chloride 1 g oral tablet: 1 tab(s) orally 3 times a day  Zoloft 100 mg oral tablet: 1 tab(s) orally once a day

## 2020-07-07 NOTE — DISCHARGE NOTE PROVIDER - HOSPITAL COURSE
78 y/o female w/pmhx of SLE, HTN, HLD, DM 2, CVA with residual expressive aphasia and Depression c/o left hip pain and hip fracture X-ray showed left femoral neck fracture. Pt had a L hip hemiarthroplasty on 7/7. Of note, there was a new 3.5 cm masslike density projecting over the medial right upper lung zone on CXR. Chest CT was very  limited due to lack of IV contrast and extensive scan artifact, but showed no gross masses. There was a prominent tortuous brachiocephalic vasculature in the right superior mediastinum that was likely accounting for the density noted on recent chest radiograph as per the radiologist.  Pt has been cleared for discharge by ortho and put on Xarelto for VTE post hip surgery.         Discharge time: 43 minutes

## 2020-07-07 NOTE — PROGRESS NOTE ADULT - ASSESSMENT
78 y/o female w/pmhx of SLE, HTN, HLD, DM2, CVA with residual expressive aphasia,  and Depression c/o left hip pain and hip fx.    Problem/Plan - 1:  ·  Problem: Closed fracture of left hip, initial encounter.  Plan: admit to medicine  NPO after MN for sugery tomorrow. follow post op recs. cont current pain control. DVT ppx after sx needs to be restarted per ortho service.     Problem/Plan - 2:  ·  Problem: Essential hypertension. controlled. cont atenolol  held losartan.     Problem/Plan - 3:  ·  Problem: Type 2 diabetes mellitus with hyperglycemia, without long-term current use of insulin.  Plan: ss insulin coverage.     Problem/Plan - 4:  ·  Problem: Hypercholesteremia.  Plan: pt no longer on statin.     Problem/Plan - 5:  ·  Problem: Depression, unspecified depression type.  Plan: cont zoloft.     Problem/Plan - 6:  Problem: Lupus. Plan: cont plaquenil.     SCDs

## 2020-07-07 NOTE — DISCHARGE NOTE PROVIDER - PROVIDER TOKENS
PROVIDER:[TOKEN:[[id number]]] PROVIDER:[TOKEN:[[id number]]],FREE:[LAST:[Your PMD],PHONE:[(   )    -],FAX:[(   )    -]]

## 2020-07-07 NOTE — DISCHARGE NOTE PROVIDER - CARE PROVIDER_API CALL
Pramod Sepulveda  ORTHOPAEDIC SURGERY  [address] [place] [address]  Phone: [phone]  Fax: [phone]  Follow Up Time: Pramod Sepulveda  ORTHOPAEDIC SURGERY  [address] [place] [address]  Phone: [phone]  Fax: [phone]  Follow Up Time:     Your PMD,   Phone: (   )    -  Fax: (   )    -  Follow Up Time:

## 2020-07-07 NOTE — PROGRESS NOTE ADULT - SUBJECTIVE AND OBJECTIVE BOX
Ortho post-op check    Pt seen and examined at bedside, resting comfortably. Tolerated procedure well without complications. Pt still somnolent from anesthesia. Denies numbness/tingling, chest pain, SOB.    T(C): 35.6 (07-07-20 @ 20:21), Max: 37.2 (07-07-20 @ 05:13)  HR: 66 (07-07-20 @ 20:36) (48 - 84)  BP: 125/65 (07-07-20 @ 20:36) (102/67 - 186/80)  RR: 13 (07-07-20 @ 20:36) (13 - 20)  SpO2: 100% (07-07-20 @ 20:36) (99% - 100%)                          9.7    3.20  )-----------( 197      ( 07 Jul 2020 05:32 )             31.7     07 Jul 2020 05:32    137    |  104    |  15     ----------------------------<  87     3.0     |  29     |  0.47     Ca    8.8        07 Jul 2020 05:32    TPro  8.7    /  Alb  3.1    /  TBili  0.3    /  DBili  x      /  AST  22     /  ALT  22     /  AlkPhos  121    07 Jul 2020 05:32      Physical Exam:  Gen: somnolent, minimally responsive    LLE:  Skin intact, dressing C/D/I  Unable to assess sensation due to mental status  + EHL/FHL/TA/GSC  + DP/PT pulses  Compartments soft, compressible    A/P: 79yFemale s/p L hip hemiarthroplasty POD0    Tolerated procedure well without complications    - Pt still somnolent, will reassess AM  - Pain control  - PT/OT  - WBAT LLE  - DVT prophylaxis to start tomorrow (lovenox)  - Remote tele  - Perioperative antibiotics per protocol  - Encourage incentive spirometry, deep breathing exercises  - Will discuss with attending, Dr. Sepulveda and advise if plan changes    Patrick Hardy, DO  PGY-2, Orthopaedic Surgery

## 2020-07-07 NOTE — DISCHARGE NOTE PROVIDER - CARE PROVIDERS DIRECT ADDRESSES
[email or web address]

## 2020-07-07 NOTE — DISCHARGE NOTE PROVIDER - NSDCFUADDINST_GEN_ALL_CORE_FT
1.	Pain Control  2.	Walking with full weight bearing as tolerated, with assistive devices (walker/Cane as Needed)  3.	DVT Prophylaxis for 30 days (lovenox)  4.	PT as needed  5.	Follow up with Dr. Sepulveda as outpatient in 10-14 Days after discharge from the hospital or rehab. Call office for appointment.  6.	Remove Staples Post-Op Day 14, and Remove Aquacel Dressing Post-Op Day 10, with Daily Dressing Changes as Need.  7.	Ice affected area as Needed  8.	Keep Dressing clean and dry.

## 2020-07-07 NOTE — DISCHARGE NOTE PROVIDER - NSDCCPCAREPLAN_GEN_ALL_CORE_FT
PRINCIPAL DISCHARGE DIAGNOSIS  Diagnosis: Hip fracture  Assessment and Plan of Treatment: femoral neck, left

## 2020-07-07 NOTE — PROGRESS NOTE ADULT - SUBJECTIVE AND OBJECTIVE BOX
CHIEF COMPLAINT: + Hip pain  no fever  no V/D reported.       PHYSICAL EXAM:    GENERAL: Elderly pleasant female, + expressive aphasia   CHEST/LUNG: Clear to ausculation bilaterally, no wheezing, no crackles   HEART: Regular rate and rhythm; + SM  ABDOMEN: Soft, Nontender, Nondistended; Bowel sounds present  EXTREMITIES:  moving arms and right with Good ROM. Limited ROM left hip.   NERVOUS SYSTEM:  Grossly non focal.  Psychiatry: Alert and awake, mood is appropriate       OBJECTIVE DATA:   Vital Signs Last 24 Hrs  T(C): 36.5 (07 Jul 2020 08:49), Max: 37.2 (07 Jul 2020 05:13)  T(F): 97.7 (07 Jul 2020 08:49), Max: 98.9 (07 Jul 2020 05:13)  HR: 75 (07 Jul 2020 08:49) (55 - 84)  BP: 102/67 (07 Jul 2020 08:49) (102/67 - 174/81)  BP(mean): --  RR: 18 (07 Jul 2020 08:49) (16 - 18)  SpO2: 100% (07 Jul 2020 08:49) (99% - 100%)           Daily Height in cm: 160.02 (06 Jul 2020 15:00)    Daily   LABS:                        9.7    3.20  )-----------( 197      ( 07 Jul 2020 05:32 )             31.7             07-07    137  |  104  |  15  ----------------------------<  87  3.0<L>   |  29  |  0.47<L>    Ca    8.8      07 Jul 2020 05:32  Phos  3.6     07-06  Mg     1.6     07-06    TPro  8.7<H>  /  Alb  3.1<L>  /  TBili  0.3  /  DBili  x   /  AST  22  /  ALT  22  /  AlkPhos  121<H>  07-07              PT/INR - ( 07 Jul 2020 05:32 )   PT: 13.7 sec;   INR: 1.25 ratio         PTT - ( 07 Jul 2020 05:32 )  PTT:32.9 sec    MEDICATIONS  (STANDING):  ATENolol  Tablet 100 milliGRAM(s) Oral daily  budesonide 160 MICROgram(s)/formoterol 4.5 MICROgram(s) Inhaler 2 Puff(s) Inhalation two times a day  folic acid 1 milliGRAM(s) Oral daily  gabapentin 100 milliGRAM(s) Oral two times a day  hydroxychloroquine 200 milliGRAM(s) Oral daily  lactated ringers. 1000 milliLiter(s) (75 mL/Hr) IV Continuous <Continuous>  pantoprazole    Tablet 40 milliGRAM(s) Oral before breakfast  sertraline 100 milliGRAM(s) Oral daily    MEDICATIONS  (PRN):  morphine  - Injectable 2 milliGRAM(s) IV Push every 4 hours PRN Moderate Pain (4 - 6)

## 2020-07-08 LAB
ANION GAP SERPL CALC-SCNC: 6 MMOL/L — SIGNIFICANT CHANGE UP (ref 5–17)
ANION GAP SERPL CALC-SCNC: 9 MMOL/L — SIGNIFICANT CHANGE UP (ref 5–17)
BUN SERPL-MCNC: 22 MG/DL — SIGNIFICANT CHANGE UP (ref 7–23)
BUN SERPL-MCNC: 26 MG/DL — HIGH (ref 7–23)
CALCIUM SERPL-MCNC: 8.5 MG/DL — SIGNIFICANT CHANGE UP (ref 8.5–10.1)
CALCIUM SERPL-MCNC: 8.7 MG/DL — SIGNIFICANT CHANGE UP (ref 8.5–10.1)
CHLORIDE SERPL-SCNC: 107 MMOL/L — SIGNIFICANT CHANGE UP (ref 96–108)
CHLORIDE SERPL-SCNC: 108 MMOL/L — SIGNIFICANT CHANGE UP (ref 96–108)
CO2 SERPL-SCNC: 21 MMOL/L — LOW (ref 22–31)
CO2 SERPL-SCNC: 24 MMOL/L — SIGNIFICANT CHANGE UP (ref 22–31)
CREAT SERPL-MCNC: 0.55 MG/DL — SIGNIFICANT CHANGE UP (ref 0.5–1.3)
CREAT SERPL-MCNC: 0.71 MG/DL — SIGNIFICANT CHANGE UP (ref 0.5–1.3)
GLUCOSE BLDC GLUCOMTR-MCNC: 118 MG/DL — HIGH (ref 70–99)
GLUCOSE BLDC GLUCOMTR-MCNC: 160 MG/DL — HIGH (ref 70–99)
GLUCOSE BLDC GLUCOMTR-MCNC: 195 MG/DL — HIGH (ref 70–99)
GLUCOSE BLDC GLUCOMTR-MCNC: 213 MG/DL — HIGH (ref 70–99)
GLUCOSE SERPL-MCNC: 123 MG/DL — HIGH (ref 70–99)
GLUCOSE SERPL-MCNC: 174 MG/DL — HIGH (ref 70–99)
HCT VFR BLD CALC: 27.1 % — LOW (ref 34.5–45)
HGB BLD-MCNC: 7.9 G/DL — LOW (ref 11.5–15.5)
MCHC RBC-ENTMCNC: 23.2 PG — LOW (ref 27–34)
MCHC RBC-ENTMCNC: 29.2 GM/DL — LOW (ref 32–36)
MCV RBC AUTO: 79.5 FL — LOW (ref 80–100)
NRBC # BLD: 0 /100 WBCS — SIGNIFICANT CHANGE UP (ref 0–0)
PLATELET # BLD AUTO: 155 K/UL — SIGNIFICANT CHANGE UP (ref 150–400)
POTASSIUM SERPL-MCNC: 4 MMOL/L — SIGNIFICANT CHANGE UP (ref 3.5–5.3)
POTASSIUM SERPL-MCNC: 4.1 MMOL/L — SIGNIFICANT CHANGE UP (ref 3.5–5.3)
POTASSIUM SERPL-SCNC: 4 MMOL/L — SIGNIFICANT CHANGE UP (ref 3.5–5.3)
POTASSIUM SERPL-SCNC: 4.1 MMOL/L — SIGNIFICANT CHANGE UP (ref 3.5–5.3)
RBC # BLD: 3.41 M/UL — LOW (ref 3.8–5.2)
RBC # FLD: 17.6 % — HIGH (ref 10.3–14.5)
SODIUM SERPL-SCNC: 137 MMOL/L — SIGNIFICANT CHANGE UP (ref 135–145)
SODIUM SERPL-SCNC: 138 MMOL/L — SIGNIFICANT CHANGE UP (ref 135–145)
WBC # BLD: 6.39 K/UL — SIGNIFICANT CHANGE UP (ref 3.8–10.5)
WBC # FLD AUTO: 6.39 K/UL — SIGNIFICANT CHANGE UP (ref 3.8–10.5)

## 2020-07-08 PROCEDURE — 99232 SBSQ HOSP IP/OBS MODERATE 35: CPT

## 2020-07-08 RX ORDER — ENOXAPARIN SODIUM 100 MG/ML
30 INJECTION SUBCUTANEOUS DAILY
Refills: 0 | Status: DISCONTINUED | OUTPATIENT
Start: 2020-07-08 | End: 2020-07-08

## 2020-07-08 RX ORDER — OXYCODONE HYDROCHLORIDE 5 MG/1
10 TABLET ORAL EVERY 4 HOURS
Refills: 0 | Status: DISCONTINUED | OUTPATIENT
Start: 2020-07-08 | End: 2020-07-08

## 2020-07-08 RX ORDER — ACETAMINOPHEN 500 MG
650 TABLET ORAL EVERY 6 HOURS
Refills: 0 | Status: DISCONTINUED | OUTPATIENT
Start: 2020-07-08 | End: 2020-07-09

## 2020-07-08 RX ORDER — SERTRALINE 25 MG/1
100 TABLET, FILM COATED ORAL DAILY
Refills: 0 | Status: DISCONTINUED | OUTPATIENT
Start: 2020-07-08 | End: 2020-07-09

## 2020-07-08 RX ORDER — GABAPENTIN 400 MG/1
100 CAPSULE ORAL
Refills: 0 | Status: DISCONTINUED | OUTPATIENT
Start: 2020-07-08 | End: 2020-07-09

## 2020-07-08 RX ORDER — INSULIN LISPRO 100/ML
VIAL (ML) SUBCUTANEOUS AT BEDTIME
Refills: 0 | Status: DISCONTINUED | OUTPATIENT
Start: 2020-07-08 | End: 2020-07-09

## 2020-07-08 RX ORDER — MAGNESIUM HYDROXIDE 400 MG/1
30 TABLET, CHEWABLE ORAL DAILY
Refills: 0 | Status: DISCONTINUED | OUTPATIENT
Start: 2020-07-08 | End: 2020-07-09

## 2020-07-08 RX ORDER — POLYETHYLENE GLYCOL 3350 17 G/17G
17 POWDER, FOR SOLUTION ORAL DAILY
Refills: 0 | Status: DISCONTINUED | OUTPATIENT
Start: 2020-07-08 | End: 2020-07-09

## 2020-07-08 RX ORDER — ONDANSETRON 8 MG/1
4 TABLET, FILM COATED ORAL EVERY 6 HOURS
Refills: 0 | Status: DISCONTINUED | OUTPATIENT
Start: 2020-07-08 | End: 2020-07-09

## 2020-07-08 RX ORDER — HEPARIN SODIUM 5000 [USP'U]/ML
5000 INJECTION INTRAVENOUS; SUBCUTANEOUS EVERY 12 HOURS
Refills: 0 | Status: DISCONTINUED | OUTPATIENT
Start: 2020-07-08 | End: 2020-07-09

## 2020-07-08 RX ORDER — PANTOPRAZOLE SODIUM 20 MG/1
40 TABLET, DELAYED RELEASE ORAL
Refills: 0 | Status: DISCONTINUED | OUTPATIENT
Start: 2020-07-08 | End: 2020-07-09

## 2020-07-08 RX ORDER — BUDESONIDE AND FORMOTEROL FUMARATE DIHYDRATE 160; 4.5 UG/1; UG/1
2 AEROSOL RESPIRATORY (INHALATION)
Refills: 0 | Status: DISCONTINUED | OUTPATIENT
Start: 2020-07-08 | End: 2020-07-09

## 2020-07-08 RX ORDER — SENNA PLUS 8.6 MG/1
2 TABLET ORAL AT BEDTIME
Refills: 0 | Status: DISCONTINUED | OUTPATIENT
Start: 2020-07-08 | End: 2020-07-09

## 2020-07-08 RX ORDER — INSULIN LISPRO 100/ML
VIAL (ML) SUBCUTANEOUS
Refills: 0 | Status: DISCONTINUED | OUTPATIENT
Start: 2020-07-08 | End: 2020-07-09

## 2020-07-08 RX ORDER — FOLIC ACID 0.8 MG
1 TABLET ORAL DAILY
Refills: 0 | Status: DISCONTINUED | OUTPATIENT
Start: 2020-07-08 | End: 2020-07-09

## 2020-07-08 RX ORDER — ATENOLOL 25 MG/1
100 TABLET ORAL DAILY
Refills: 0 | Status: DISCONTINUED | OUTPATIENT
Start: 2020-07-08 | End: 2020-07-09

## 2020-07-08 RX ORDER — OXYCODONE HYDROCHLORIDE 5 MG/1
5 TABLET ORAL EVERY 4 HOURS
Refills: 0 | Status: DISCONTINUED | OUTPATIENT
Start: 2020-07-08 | End: 2020-07-09

## 2020-07-08 RX ORDER — HYDROMORPHONE HYDROCHLORIDE 2 MG/ML
0.5 INJECTION INTRAMUSCULAR; INTRAVENOUS; SUBCUTANEOUS EVERY 4 HOURS
Refills: 0 | Status: DISCONTINUED | OUTPATIENT
Start: 2020-07-08 | End: 2020-07-08

## 2020-07-08 RX ORDER — ASCORBIC ACID 60 MG
500 TABLET,CHEWABLE ORAL
Refills: 0 | Status: DISCONTINUED | OUTPATIENT
Start: 2020-07-08 | End: 2020-07-09

## 2020-07-08 RX ADMIN — GABAPENTIN 100 MILLIGRAM(S): 400 CAPSULE ORAL at 17:23

## 2020-07-08 RX ADMIN — Medication 2: at 12:06

## 2020-07-08 RX ADMIN — Medication 1 MILLIGRAM(S): at 12:06

## 2020-07-08 RX ADMIN — PANTOPRAZOLE SODIUM 40 MILLIGRAM(S): 20 TABLET, DELAYED RELEASE ORAL at 22:05

## 2020-07-08 RX ADMIN — ENOXAPARIN SODIUM 30 MILLIGRAM(S): 100 INJECTION SUBCUTANEOUS at 12:35

## 2020-07-08 RX ADMIN — Medication 1: at 16:47

## 2020-07-08 RX ADMIN — Medication 1 TABLET(S): at 12:06

## 2020-07-08 RX ADMIN — Medication 500 MILLIGRAM(S): at 17:23

## 2020-07-08 RX ADMIN — ATENOLOL 100 MILLIGRAM(S): 25 TABLET ORAL at 17:24

## 2020-07-08 RX ADMIN — BUDESONIDE AND FORMOTEROL FUMARATE DIHYDRATE 2 PUFF(S): 160; 4.5 AEROSOL RESPIRATORY (INHALATION) at 17:23

## 2020-07-08 RX ADMIN — POLYETHYLENE GLYCOL 3350 17 GRAM(S): 17 POWDER, FOR SOLUTION ORAL at 12:06

## 2020-07-08 RX ADMIN — OXYCODONE HYDROCHLORIDE 10 MILLIGRAM(S): 5 TABLET ORAL at 22:05

## 2020-07-08 RX ADMIN — SERTRALINE 100 MILLIGRAM(S): 25 TABLET, FILM COATED ORAL at 12:06

## 2020-07-08 NOTE — OCCUPATIONAL THERAPY INITIAL EVALUATION ADULT - GENERAL OBSERVATIONS, REHAB EVAL
Pt was encountered supine in bed; NAD, Abductor wedge +, pneumatic teds +, S/P L Son hip arthroplasty POD 1, posterior hip precautions, LLE WBAT, L hip dressing clean, dry and intact, AXOX2, voice is soft spoken, speech garbled (Missing top dentures), cooperative, followed 1 step commands; pt c/o pain in L hip due to s/p L THR which limits pt performance with functional ADL's/transfers and mobility; +1 edema noted in LLE.

## 2020-07-08 NOTE — OCCUPATIONAL THERAPY INITIAL EVALUATION ADULT - RANGE OF MOTION, PT EVAL
Pt will have WFL L Hip ROM (within posterior approach precautions) in order to perform functional tasks with assist within 4 weeks.

## 2020-07-08 NOTE — PHYSICAL THERAPY INITIAL EVALUATION ADULT - ADDITIONAL COMMENTS
Pt lives in a private house with 2 steps to enter and 1 handrail, once inside there are no further steps to negotiate. Pt has a rolling walker, shower chair (broken), bathroom has rails, regular hospital bed. Pt had home PT, was ambulating short distances with increased pain since February. The fall in February was unwitnessed.

## 2020-07-08 NOTE — OCCUPATIONAL THERAPY INITIAL EVALUATION ADULT - ADDITIONAL COMMENTS
As per emergency contact (Shyanne Birmingham) and PT Alesia, Patient lives in a private house with 2 steps to enter + rail; All amenities on first floor when entering. Patient bathroom has a shower stall with a regular toilet, grab bars and a shower chair. The patient ambulates with a rolling walker. The patient has a HHA that assists the patient with all ADLS and mobility.

## 2020-07-08 NOTE — PHYSICAL THERAPY INITIAL EVALUATION ADULT - TRANSFER SAFETY CONCERNS NOTED: SIT/STAND, REHAB EVAL
decreased safety awareness/losing balance/decreased weight-shifting ability/Poor eccentric control/decreased balance during turns

## 2020-07-08 NOTE — PROGRESS NOTE ADULT - SUBJECTIVE AND OBJECTIVE BOX
Patient is a 79y old  Female who presents with a chief complaint of hip pain (08 Jul 2020 05:53)      INTERVAL HPI/OVERNIGHT EVENTS:    MEDICATIONS  (STANDING):  ascorbic acid 500 milliGRAM(s) Oral two times a day  ATENolol  Tablet 100 milliGRAM(s) Oral daily  budesonide 160 MICROgram(s)/formoterol 4.5 MICROgram(s) Inhaler 2 Puff(s) Inhalation two times a day  dextrose 5%. 1000 milliLiter(s) (50 mL/Hr) IV Continuous <Continuous>  dextrose 50% Injectable 12.5 Gram(s) IV Push once  dextrose 50% Injectable 25 Gram(s) IV Push once  dextrose 50% Injectable 25 Gram(s) IV Push once  enoxaparin Injectable 30 milliGRAM(s) SubCutaneous daily  folic acid 1 milliGRAM(s) Oral daily  gabapentin 100 milliGRAM(s) Oral two times a day  insulin lispro (HumaLOG) corrective regimen sliding scale   SubCutaneous three times a day before meals  insulin lispro (HumaLOG) corrective regimen sliding scale   SubCutaneous at bedtime  multivitamin 1 Tablet(s) Oral daily  pantoprazole    Tablet 40 milliGRAM(s) Oral before breakfast  polyethylene glycol 3350 17 Gram(s) Oral daily  sertraline 100 milliGRAM(s) Oral daily    MEDICATIONS  (PRN):  acetaminophen   Tablet .. 650 milliGRAM(s) Oral every 6 hours PRN Temp greater or equal to 38C (100.4F)  aluminum hydroxide/magnesium hydroxide/simethicone Suspension 30 milliLiter(s) Oral four times a day PRN Indigestion  dextrose 40% Gel 15 Gram(s) Oral once PRN Blood Glucose LESS THAN 70 milliGRAM(s)/deciliter  glucagon  Injectable 1 milliGRAM(s) IntraMuscular once PRN Glucose LESS THAN 70 milligrams/deciliter  HYDROmorphone  Injectable 0.5 milliGRAM(s) IV Push every 4 hours PRN Severe Pain (7 - 10)  magnesium hydroxide Suspension 30 milliLiter(s) Oral daily PRN Constipation  ondansetron Injectable 4 milliGRAM(s) IV Push every 6 hours PRN Nausea and/or Vomiting  oxyCODONE    IR 5 milliGRAM(s) Oral every 4 hours PRN Mild Pain (1 - 3)  oxyCODONE    IR 10 milliGRAM(s) Oral every 4 hours PRN Moderate Pain (4 - 6)  senna 2 Tablet(s) Oral at bedtime PRN Constipation      Allergies    SULFA (Unknown)  sulfa drugs (Unknown)    Intolerances        Vital Signs Last 24 Hrs  T(C): 36.7 (08 Jul 2020 20:09), Max: 37.1 (08 Jul 2020 12:08)  T(F): 98.1 (08 Jul 2020 20:09), Max: 98.8 (08 Jul 2020 12:08)  HR: 75 (08 Jul 2020 20:09) (59 - 85)  BP: 159/74 (08 Jul 2020 20:09) (103/46 - 164/70)  BP(mean): --  RR: 18 (08 Jul 2020 20:09) (16 - 18)  SpO2: 98% (08 Jul 2020 20:09) (91% - 99%)    PHYSICAL EXAM:  GENERAL: NAD, well-groomed, well-developed  HEAD:  Atraumatic, Normocephalic  EYES: EOMI, PERRLA, conjunctiva and sclera clear  ENMT: No tonsillar erythema, exudates, or enlargement; Moist mucous membranes, Good dentition, No lesions  NECK: Supple, No JVD, Normal thyroid  NERVOUS SYSTEM:  Alert & Oriented X3, Good concentration; Motor Strength 5/5 B/L upper and lower extremities; DTRs 2+ intact and symmetric  CHEST/LUNG: Clear to auscultation bilaterally; No rales, rhonchi, wheezing, or rubs  HEART: Regular rate and rhythm; No murmurs, rubs, or gallops  ABDOMEN: Soft, Nontender, Nondistended; Bowel sounds present  EXTREMITIES:  2+ Peripheral Pulses, No clubbing, cyanosis, or edema  LYMPH: No lymphadenopathy noted  SKIN: No rashes or lesions    LABS:                        7.9    6.39  )-----------( 155      ( 08 Jul 2020 06:34 )             27.1     07-08    137  |  107  |  26<H>  ----------------------------<  174<H>  4.0   |  21<L>  |  0.71    Ca    8.7      08 Jul 2020 06:34    TPro  8.7<H>  /  Alb  3.1<L>  /  TBili  0.3  /  DBili  x   /  AST  22  /  ALT  22  /  AlkPhos  121<H>  07-07    PT/INR - ( 07 Jul 2020 05:32 )   PT: 13.7 sec;   INR: 1.25 ratio         PTT - ( 07 Jul 2020 05:32 )  PTT:32.9 sec    CAPILLARY BLOOD GLUCOSE      POCT Blood Glucose.: 118 mg/dL (08 Jul 2020 21:48)  POCT Blood Glucose.: 160 mg/dL (08 Jul 2020 16:19)  POCT Blood Glucose.: 213 mg/dL (08 Jul 2020 11:10)  POCT Blood Glucose.: 195 mg/dL (08 Jul 2020 07:50)      RADIOLOGY & ADDITIONAL TESTS:    07-08-20 @ 07:01  -  07-08-20 @ 21:58  --------------------------------------------------------  IN:    Oral Fluid: 360 mL  Total IN: 360 mL    OUT:  Total OUT: 0 mL    Total NET: 360 mL 78 y/o female w/pmhx of SLE, HTN, HLD, DM 2, CVA with residual expressive aphasia and Depression c/o left hip pain and hip fracture. She is lying in bed in NAD.      MEDICATIONS  (STANDING):  ascorbic acid 500 milliGRAM(s) Oral two times a day  ATENolol  Tablet 100 milliGRAM(s) Oral daily  budesonide 160 MICROgram(s)/formoterol 4.5 MICROgram(s) Inhaler 2 Puff(s) Inhalation two times a day  dextrose 5%. 1000 milliLiter(s) (50 mL/Hr) IV Continuous <Continuous>  dextrose 50% Injectable 12.5 Gram(s) IV Push once  dextrose 50% Injectable 25 Gram(s) IV Push once  dextrose 50% Injectable 25 Gram(s) IV Push once  enoxaparin Injectable 30 milliGRAM(s) SubCutaneous daily  folic acid 1 milliGRAM(s) Oral daily  gabapentin 100 milliGRAM(s) Oral two times a day  insulin lispro (HumaLOG) corrective regimen sliding scale   SubCutaneous three times a day before meals  insulin lispro (HumaLOG) corrective regimen sliding scale   SubCutaneous at bedtime  multivitamin 1 Tablet(s) Oral daily  pantoprazole    Tablet 40 milliGRAM(s) Oral before breakfast  polyethylene glycol 3350 17 Gram(s) Oral daily  sertraline 100 milliGRAM(s) Oral daily    MEDICATIONS  (PRN):  acetaminophen   Tablet .. 650 milliGRAM(s) Oral every 6 hours PRN Temp greater or equal to 38C (100.4F)  aluminum hydroxide/magnesium hydroxide/simethicone Suspension 30 milliLiter(s) Oral four times a day PRN Indigestion  dextrose 40% Gel 15 Gram(s) Oral once PRN Blood Glucose LESS THAN 70 milliGRAM(s)/deciliter  glucagon  Injectable 1 milliGRAM(s) IntraMuscular once PRN Glucose LESS THAN 70 milligrams/deciliter  HYDROmorphone  Injectable 0.5 milliGRAM(s) IV Push every 4 hours PRN Severe Pain (7 - 10)  magnesium hydroxide Suspension 30 milliLiter(s) Oral daily PRN Constipation  ondansetron Injectable 4 milliGRAM(s) IV Push every 6 hours PRN Nausea and/or Vomiting  oxyCODONE    IR 5 milliGRAM(s) Oral every 4 hours PRN Mild Pain (1 - 3)  oxyCODONE    IR 10 milliGRAM(s) Oral every 4 hours PRN Moderate Pain (4 - 6)  senna 2 Tablet(s) Oral at bedtime PRN Constipation      Allergies    SULFA (Unknown)  sulfa drugs (Unknown)    Intolerances        Vital Signs Last 24 Hrs  T(C): 36.7 (08 Jul 2020 20:09), Max: 37.1 (08 Jul 2020 12:08)  T(F): 98.1 (08 Jul 2020 20:09), Max: 98.8 (08 Jul 2020 12:08)  HR: 75 (08 Jul 2020 20:09) (59 - 85)  BP: 159/74 (08 Jul 2020 20:09) (103/46 - 164/70)  BP(mean): --  RR: 18 (08 Jul 2020 20:09) (16 - 18)  SpO2: 98% (08 Jul 2020 20:09) (91% - 99%)    PHYSICAL EXAM:  GENERAL: NAD   HEAD:  Atraumatic, Normocephalic  EYES: EOMI, PERRLA  NECK: Supple   NERVOUS SYSTEM: Alert   CHEST/LUNG: Clear to auscultation bilaterally; No rales, rhonchi, wheezing, or rubs  HEART: Regular rate and rhythm; No murmurs, rubs, or gallops  ABDOMEN: Soft, Nontender, Nondistended; Bowel sounds present  EXTREMITIES: LLE in brace     LABS:                        7.9    6.39  )-----------( 155      ( 08 Jul 2020 06:34 )             27.1     07-08    137  |  107  |  26<H>  ----------------------------<  174<H>  4.0   |  21<L>  |  0.71    Ca    8.7      08 Jul 2020 06:34    TPro  8.7<H>  /  Alb  3.1<L>  /  TBili  0.3  /  DBili  x   /  AST  22  /  ALT  22  /  AlkPhos  121<H>  07-07    PT/INR - ( 07 Jul 2020 05:32 )   PT: 13.7 sec;   INR: 1.25 ratio         PTT - ( 07 Jul 2020 05:32 )  PTT:32.9 sec    CAPILLARY BLOOD GLUCOSE      POCT Blood Glucose.: 118 mg/dL (08 Jul 2020 21:48)  POCT Blood Glucose.: 160 mg/dL (08 Jul 2020 16:19)  POCT Blood Glucose.: 213 mg/dL (08 Jul 2020 11:10)  POCT Blood Glucose.: 195 mg/dL (08 Jul 2020 07:50)      RADIOLOGY & ADDITIONAL TESTS:    07-08-20 @ 07:01  -  07-08-20 @ 21:58  --------------------------------------------------------  IN:    Oral Fluid: 360 mL  Total IN: 360 mL    OUT:  Total OUT: 0 mL    Total NET: 360 mL

## 2020-07-08 NOTE — OCCUPATIONAL THERAPY INITIAL EVALUATION ADULT - PRECAUTIONS/LIMITATIONS, REHAB EVAL
fall precautions/No bending of the hip beyond 90 degrees, nio hip adduction beyond midline (no crossing of the legs) and no hip internal rotation beyond neutral./left hip precautions

## 2020-07-08 NOTE — OCCUPATIONAL THERAPY INITIAL EVALUATION ADULT - RANGE OF MOTION EXAMINATION, LOWER EXTREMITY
Right LE Active ROM was WFL   (within functional limits)/Right LE Passive ROM was WFL  (within functional limits)/LLE AROM hip flexion grossly limited by more then 50%; LLE AROM knee flexion grossly limited by more then 25%; LLE AROM distally to knee grossly WFL

## 2020-07-08 NOTE — OCCUPATIONAL THERAPY INITIAL EVALUATION ADULT - ADL RETRAINING, OT EVAL
Patient will be able to perform functional tasks with minimal assist, using least restrictive device, within 4 weeks.

## 2020-07-08 NOTE — PHYSICAL THERAPY INITIAL EVALUATION ADULT - TRANSFER TRAINING, PT EVAL
pt will perform sit to stand transfers with contact guard assistance and appropriate device x8 weeks

## 2020-07-08 NOTE — PROGRESS NOTE ADULT - SUBJECTIVE AND OBJECTIVE BOX
Pt seen and examined at bedside, resting comfortably. No acute events overnight. Denies numbness/tingling, chest pain, SOB.    Vital Signs Last 24 Hrs  T(C): 36.5 (08 Jul 2020 04:40), Max: 37.1 (07 Jul 2020 08:21)  T(F): 97.7 (08 Jul 2020 04:40), Max: 98.7 (07 Jul 2020 08:21)  HR: 60 (08 Jul 2020 04:40) (48 - 78)  BP: 128/43 (08 Jul 2020 04:40) (102/67 - 186/80)  BP(mean): 76 (07 Jul 2020 21:36) (72 - 79)  RR: 16 (08 Jul 2020 04:40) (13 - 20)  SpO2: 97% (08 Jul 2020 04:40) (91% - 100%)    Physical Exam:  Gen: NAD    LLE:  Skin intact, dressing C/D/I  SILT L2-S1  + EHL/FHL/TA/GSC  + DP/PT pulses  Compartments soft, compressible    A/P: 79yFemale s/p L hip hemiarthroplasty POD1    - Pain control  - PT/OT  - WBAT LLE  - DVT prophylaxis (lovenox)  - Remote tele  - Encourage incentive spirometry, deep breathing exercises  - Medical management per primary team  - Will discuss with attending, Dr. Sepulveda and advise if plan changes    Patrick Hardy, DO  PGY-2, Orthopaedic Surgery

## 2020-07-08 NOTE — OCCUPATIONAL THERAPY INITIAL EVALUATION ADULT - RLE MMT, REHAB EVAL
Grossly less then 3/5 hip flexion; Grossly less then or equal to 3/5 knee flexion; Grossly greater then 3/5 distally to knee.

## 2020-07-08 NOTE — PHYSICAL THERAPY INITIAL EVALUATION ADULT - STRENGTHENING, PT EVAL
pt will increase BUE/LE strength by full grade increased strength for ambulation, ADLs, transfers x8 weeks

## 2020-07-08 NOTE — OCCUPATIONAL THERAPY INITIAL EVALUATION ADULT - PERTINENT HX OF CURRENT PROBLEM, REHAB EVAL
Pt is a 80 y/o female w/pmhx of SLE, HTN, HLD,  DM2, CVA with residual expressive aphasia, and Depression c/o left hip pain. X-ray of L femur on 7/6/20 revealed displaced left femoral neck fracture. Patient is s/p L edith-hip arthroplasty POD 1

## 2020-07-08 NOTE — OCCUPATIONAL THERAPY INITIAL EVALUATION ADULT - BED MOBILITY TRAINING, PT EVAL
Patient will be able to perform bed mobility tasks with minimal assist, using least restrictive device, within 4 weeks.

## 2020-07-08 NOTE — OCCUPATIONAL THERAPY INITIAL EVALUATION ADULT - STRENGTHENING, PT EVAL
Pt will increase left lower extremity strength to 5/5 to improve functional strength needed to engage in functional tasks by 4 weeks

## 2020-07-08 NOTE — PHYSICAL THERAPY INITIAL EVALUATION ADULT - PERTINENT HX OF CURRENT PROBLEM, REHAB EVAL
Pt is a 78 y/o female w/pmhx of SLE, HTN, HLD,  DM2, CVA with residual expressive aphasia,  and Depression c/o left hip pain.  Son at bedside, states she had fell back in Feb. and has been mostly in bed and saw Dr. Sepulveda in office today and found to have hip fx and sent ed.

## 2020-07-08 NOTE — OCCUPATIONAL THERAPY INITIAL EVALUATION ADULT - TRANSFER TRAINING, PT EVAL
Patient will be able to perform functional transfers, using least restrictive device, with assist within 4 weeks.

## 2020-07-08 NOTE — PHYSICAL THERAPY INITIAL EVALUATION ADULT - ACTIVE RANGE OF MOTION EXAMINATION, REHAB EVAL
B LE AROM: hip flexion to 50 degrees, knee flexion to 80 degrees, R ankle to neutral. L hip AAROM: hip flexion to 20 degrees, L knee flexion to 20 degrees, L ankle lacking 5 degrees to neutral/bilateral upper extremity Active ROM was WFL (within functional limits)

## 2020-07-08 NOTE — OCCUPATIONAL THERAPY INITIAL EVALUATION ADULT - RANGE OF MOTION EXAMINATION, UPPER EXTREMITY
Grossly./bilateral UE Passive ROM was WFL  (within functional limits)/bilateral UE Active ROM was WFL  (within functional limits)

## 2020-07-08 NOTE — PHYSICAL THERAPY INITIAL EVALUATION ADULT - PLANNED THERAPY INTERVENTIONS, PT EVAL
transfer training/bed mobility training/strengthening/ROM/pt will increase AROM by 15 degrees to L hip for ADLS and transfers x8 weeks/gait training/balance training

## 2020-07-08 NOTE — PROGRESS NOTE ADULT - ASSESSMENT
78 y/o female w/pmhx of SLE, HTN, HLD, DM2, CVA with residual expressive aphasia,  and Depression c/o left hip pain and hip fx.      Closed fracture of left hip       Essential hypertension  - c/w atenolol  - hold losartan      DM II  - c/w       Hypercholesteremia  - c/w     Depression  - c/w Zoloft       Lupus  - c/w Plaquenil     Prophylaxis:  DVT: SCD  GI: 80 y/o female w/pmhx of SLE, HTN, HLD, DM 2, CVA with residual expressive aphasia and Depression c/o left hip pain and hip fx.    Closed fracture of left hip   - x-ray showed left femoral neck fracture  - s/p L hip hemiarthroplasty on 7/7  - c/w Oxy for pain    New 3.5 cm masslike density projecting over the medial right upper lung zone on CXR  - will get chest CT to evaluate     Essential hypertension  - c/w atenolol  - hold losartan    DM II  - c/w ISS    Depression  - c/w Zoloft     Lupus  - c/w Plaquenil     Prophylaxis:  DVT: Heparin   GI: Po diet

## 2020-07-08 NOTE — PHYSICAL THERAPY INITIAL EVALUATION ADULT - GENERAL OBSERVATIONS, REHAB EVAL
Pt encountered supine in bed, alert and oriented x2, abduction pillow and pneumatic teds in place, dressing to L hip c/d/i, NAD. Pt is s/p L Son hip arthroplasty POD#1 with posterior hip precautions, LLE WBAT.

## 2020-07-09 ENCOUNTER — TRANSCRIPTION ENCOUNTER (OUTPATIENT)
Age: 80
End: 2020-07-09

## 2020-07-09 VITALS
HEART RATE: 74 BPM | RESPIRATION RATE: 18 BRPM | OXYGEN SATURATION: 97 % | DIASTOLIC BLOOD PRESSURE: 77 MMHG | SYSTOLIC BLOOD PRESSURE: 164 MMHG

## 2020-07-09 LAB
A1C WITH ESTIMATED AVERAGE GLUCOSE RESULT: 5.6 % — SIGNIFICANT CHANGE UP (ref 4–5.6)
ANION GAP SERPL CALC-SCNC: 6 MMOL/L — SIGNIFICANT CHANGE UP (ref 5–17)
APPEARANCE UR: CLEAR — SIGNIFICANT CHANGE UP
BACTERIA # UR AUTO: ABNORMAL
BILIRUB UR-MCNC: NEGATIVE — SIGNIFICANT CHANGE UP
BUN SERPL-MCNC: 36 MG/DL — HIGH (ref 7–23)
CALCIUM SERPL-MCNC: 8.4 MG/DL — LOW (ref 8.5–10.1)
CHLORIDE SERPL-SCNC: 106 MMOL/L — SIGNIFICANT CHANGE UP (ref 96–108)
CO2 SERPL-SCNC: 27 MMOL/L — SIGNIFICANT CHANGE UP (ref 22–31)
COLOR SPEC: YELLOW — SIGNIFICANT CHANGE UP
CREAT SERPL-MCNC: 0.41 MG/DL — LOW (ref 0.5–1.3)
DIFF PNL FLD: NEGATIVE — SIGNIFICANT CHANGE UP
EPI CELLS # UR: SIGNIFICANT CHANGE UP
ESTIMATED AVERAGE GLUCOSE: 114 MG/DL — SIGNIFICANT CHANGE UP (ref 68–114)
GLUCOSE BLDC GLUCOMTR-MCNC: 116 MG/DL — HIGH (ref 70–99)
GLUCOSE BLDC GLUCOMTR-MCNC: 123 MG/DL — HIGH (ref 70–99)
GLUCOSE BLDC GLUCOMTR-MCNC: 131 MG/DL — HIGH (ref 70–99)
GLUCOSE SERPL-MCNC: 108 MG/DL — HIGH (ref 70–99)
GLUCOSE UR QL: NEGATIVE MG/DL — SIGNIFICANT CHANGE UP
HCT VFR BLD CALC: 26.9 % — LOW (ref 34.5–45)
HGB BLD-MCNC: 8.4 G/DL — LOW (ref 11.5–15.5)
KETONES UR-MCNC: NEGATIVE — SIGNIFICANT CHANGE UP
LEUKOCYTE ESTERASE UR-ACNC: ABNORMAL
MCHC RBC-ENTMCNC: 24.6 PG — LOW (ref 27–34)
MCHC RBC-ENTMCNC: 31.2 GM/DL — LOW (ref 32–36)
MCV RBC AUTO: 78.7 FL — LOW (ref 80–100)
NITRITE UR-MCNC: NEGATIVE — SIGNIFICANT CHANGE UP
NRBC # BLD: 0 /100 WBCS — SIGNIFICANT CHANGE UP (ref 0–0)
PH UR: 6 — SIGNIFICANT CHANGE UP (ref 5–8)
PLATELET # BLD AUTO: 145 K/UL — LOW (ref 150–400)
POTASSIUM SERPL-MCNC: 4.2 MMOL/L — SIGNIFICANT CHANGE UP (ref 3.5–5.3)
POTASSIUM SERPL-SCNC: 4.2 MMOL/L — SIGNIFICANT CHANGE UP (ref 3.5–5.3)
PROT UR-MCNC: 15 MG/DL
RBC # BLD: 3.42 M/UL — LOW (ref 3.8–5.2)
RBC # FLD: 16.8 % — HIGH (ref 10.3–14.5)
RBC CASTS # UR COMP ASSIST: NEGATIVE /HPF — SIGNIFICANT CHANGE UP (ref 0–4)
SODIUM SERPL-SCNC: 139 MMOL/L — SIGNIFICANT CHANGE UP (ref 135–145)
SP GR SPEC: 1.01 — SIGNIFICANT CHANGE UP (ref 1.01–1.02)
UROBILINOGEN FLD QL: NEGATIVE MG/DL — SIGNIFICANT CHANGE UP
WBC # BLD: 6.48 K/UL — SIGNIFICANT CHANGE UP (ref 3.8–10.5)
WBC # FLD AUTO: 6.48 K/UL — SIGNIFICANT CHANGE UP (ref 3.8–10.5)
WBC UR QL: SIGNIFICANT CHANGE UP

## 2020-07-09 PROCEDURE — 99239 HOSP IP/OBS DSCHRG MGMT >30: CPT

## 2020-07-09 PROCEDURE — 71250 CT THORAX DX C-: CPT | Mod: 26

## 2020-07-09 RX ORDER — RIVAROXABAN 15 MG-20MG
10 KIT ORAL DAILY
Refills: 0 | Status: DISCONTINUED | OUTPATIENT
Start: 2020-07-09 | End: 2020-07-09

## 2020-07-09 RX ORDER — ASCORBIC ACID 60 MG
1 TABLET,CHEWABLE ORAL
Qty: 0 | Refills: 0 | DISCHARGE
Start: 2020-07-09

## 2020-07-09 RX ORDER — OXYCODONE HYDROCHLORIDE 5 MG/1
1 TABLET ORAL
Qty: 0 | Refills: 0 | DISCHARGE
Start: 2020-07-09

## 2020-07-09 RX ORDER — RIVAROXABAN 15 MG-20MG
1 KIT ORAL
Qty: 0 | Refills: 0 | DISCHARGE
Start: 2020-07-09

## 2020-07-09 RX ADMIN — BUDESONIDE AND FORMOTEROL FUMARATE DIHYDRATE 2 PUFF(S): 160; 4.5 AEROSOL RESPIRATORY (INHALATION) at 17:40

## 2020-07-09 RX ADMIN — GABAPENTIN 100 MILLIGRAM(S): 400 CAPSULE ORAL at 05:52

## 2020-07-09 RX ADMIN — PANTOPRAZOLE SODIUM 40 MILLIGRAM(S): 20 TABLET, DELAYED RELEASE ORAL at 06:06

## 2020-07-09 RX ADMIN — ATENOLOL 100 MILLIGRAM(S): 25 TABLET ORAL at 05:53

## 2020-07-09 RX ADMIN — BUDESONIDE AND FORMOTEROL FUMARATE DIHYDRATE 2 PUFF(S): 160; 4.5 AEROSOL RESPIRATORY (INHALATION) at 05:53

## 2020-07-09 RX ADMIN — Medication 1 TABLET(S): at 12:46

## 2020-07-09 RX ADMIN — Medication 500 MILLIGRAM(S): at 05:52

## 2020-07-09 RX ADMIN — HEPARIN SODIUM 5000 UNIT(S): 5000 INJECTION INTRAVENOUS; SUBCUTANEOUS at 05:53

## 2020-07-09 RX ADMIN — GABAPENTIN 100 MILLIGRAM(S): 400 CAPSULE ORAL at 17:40

## 2020-07-09 RX ADMIN — Medication 1 MILLIGRAM(S): at 12:46

## 2020-07-09 RX ADMIN — Medication 500 MILLIGRAM(S): at 17:40

## 2020-07-09 RX ADMIN — SERTRALINE 100 MILLIGRAM(S): 25 TABLET, FILM COATED ORAL at 12:47

## 2020-07-09 RX ADMIN — POLYETHYLENE GLYCOL 3350 17 GRAM(S): 17 POWDER, FOR SOLUTION ORAL at 12:47

## 2020-07-09 NOTE — DIETITIAN INITIAL EVALUATION ADULT. - PHYSICAL APPEARANCE
underweight/other (specify)/BMI=15.3 (07/07/20), no edema noted/debilitated/emaciated Pt c visible depletion of orbital, temple, ribs, clavicle, shoulder & interosseous areas.

## 2020-07-09 NOTE — DIETITIAN INITIAL EVALUATION ADULT. - FACTORS AFF FOOD INTAKE
difficulty chewing/pt c dentures, needs help in putting in dentures, missing bottom teeth noted, loose bottom gums reported, pt receptive to changing diet to ground/mechanical soft foods/other (specify)

## 2020-07-09 NOTE — DIETITIAN INITIAL EVALUATION ADULT. - ENERGY NEEDS
Height (cm): 152.4 (07-07)  Weight (kg): 35.5 (07-07)  BMI (kg/m2): 15.3 (07-07)  IBW: 45.3 kg      % IBW: 78%            UBW:  ?            %UBW: ?.  ? wt. gain of 7 kg since adm, no edema noted.

## 2020-07-09 NOTE — DIETITIAN INITIAL EVALUATION ADULT. - OTHER INFO
Pt appeared forgetful, able to state date of birth, pt's speech is unclear therefore difficult to understand & obtain accurate diet hx. Pt lived alone & had HHA assistance.   As per previous RD note, pt was 94.7 LBS/ 43 kg on 09/30/19.  Pt reported that she was in Rehab & lost weight as she was unable to feed herself.  Pt was not diabetic meds PTA  Pt s/p hemiarthroplasty 07/07/20, d/c plan is for Rehab.

## 2020-07-09 NOTE — PROGRESS NOTE ADULT - SUBJECTIVE AND OBJECTIVE BOX
Orthopedics      Patient seen and examined at bedside. Feeling well. Pain controlled. No n/v. No acute events overnight.    Vital Signs Last 24 Hrs  T(C): 36.8 (07-09-20 @ 05:07), Max: 37.1 (07-08-20 @ 12:08)  T(F): 98.3 (07-09-20 @ 05:07), Max: 98.8 (07-08-20 @ 12:08)  HR: 71 (07-09-20 @ 05:07) (59 - 85)  BP: 143/64 (07-09-20 @ 05:07) (134/76 - 164/70)  BP(mean): --  RR: 17 (07-09-20 @ 05:07) (17 - 18)  SpO2: 96% (07-09-20 @ 05:07) (96% - 99%)                        7.9    6.39  )-----------( 155      ( 08 Jul 2020 06:34 )             27.1     08 Jul 2020 06:34    137    |  107    |  26     ----------------------------<  174    4.0     |  21     |  0.71     Ca    8.7        08 Jul 2020 06:34    TPro  8.7    /  Alb  3.1    /  TBili  0.3    /  DBili  x      /  AST  22     /  ALT  22     /  AlkPhos  121    07 Jul 2020 05:32    PT/INR - ( 07 Jul 2020 05:32 )   PT: 13.7 sec;   INR: 1.25 ratio         PTT - ( 07 Jul 2020 05:32 )  PTT:32.9 sec    Exam:  Gen: NAD, resting comfortably  LLE:  Dressing c/d/i  NTTP calves b/l  +EHL/FHL/TA/GS  SILT L2-S1  Compartments soft and compressible  2+ Pulses palpable      A/P: 79yF POD 2 s/p L hip hemiarthroplasty  -FU AM labs  -Pain control  -WBAT LLE, hip abduction pillow at all times  -DVT ppx  -Incentive Spirometry  -Elevation to extremity  -Medical management appreciated

## 2020-07-09 NOTE — DIETITIAN INITIAL EVALUATION ADULT. - PERTINENT MEDS FT
MEDICATIONS  (STANDING):  ascorbic acid 500 milliGRAM(s) Oral two times a day  ATENolol  Tablet 100 milliGRAM(s) Oral daily  budesonide 160 MICROgram(s)/formoterol 4.5 MICROgram(s) Inhaler 2 Puff(s) Inhalation two times a day  dextrose 5%. 1000 milliLiter(s) (50 mL/Hr) IV Continuous <Continuous>  dextrose 50% Injectable 12.5 Gram(s) IV Push once  dextrose 50% Injectable 25 Gram(s) IV Push once  dextrose 50% Injectable 25 Gram(s) IV Push once  folic acid 1 milliGRAM(s) Oral daily  gabapentin 100 milliGRAM(s) Oral two times a day  insulin lispro (HumaLOG) corrective regimen sliding scale   SubCutaneous three times a day before meals  insulin lispro (HumaLOG) corrective regimen sliding scale   SubCutaneous at bedtime  multivitamin 1 Tablet(s) Oral daily  pantoprazole    Tablet 40 milliGRAM(s) Oral before breakfast  polyethylene glycol 3350 17 Gram(s) Oral daily  rivaroxaban 10 milliGRAM(s) Oral daily  sertraline 100 milliGRAM(s) Oral daily    MEDICATIONS  (PRN):  acetaminophen   Tablet .. 650 milliGRAM(s) Oral every 6 hours PRN Temp greater or equal to 38C (100.4F)  aluminum hydroxide/magnesium hydroxide/simethicone Suspension 30 milliLiter(s) Oral four times a day PRN Indigestion  dextrose 40% Gel 15 Gram(s) Oral once PRN Blood Glucose LESS THAN 70 milliGRAM(s)/deciliter  glucagon  Injectable 1 milliGRAM(s) IntraMuscular once PRN Glucose LESS THAN 70 milligrams/deciliter  magnesium hydroxide Suspension 30 milliLiter(s) Oral daily PRN Constipation  ondansetron Injectable 4 milliGRAM(s) IV Push every 6 hours PRN Nausea and/or Vomiting  oxyCODONE    IR 5 milliGRAM(s) Oral every 4 hours PRN Mild Pain (1 - 3)  senna 2 Tablet(s) Oral at bedtime PRN Constipation

## 2020-07-09 NOTE — DISCHARGE NOTE NURSING/CASE MANAGEMENT/SOCIAL WORK - PATIENT PORTAL LINK FT
You can access the FollowMyHealth Patient Portal offered by [facility] by registering at the following website: [email or web address]. By joining [employer]’s FollowMyHealth portal, you will also be able to view your health information using other applications (apps) compatible with our system.

## 2020-07-09 NOTE — CHART NOTE - NSCHARTNOTEFT_GEN_A_CORE
Upon Nutritional Assessment by the Registered Dietitian your patient was determined to meet criteria / has evidence of the following diagnosis/diagnoses:          [ ]  Mild Protein Calorie Malnutrition        [ ]  Moderate Protein Calorie Malnutrition        [x ] Severe Protein Calorie Malnutrition        [ ] Unspecified Protein Calorie Malnutrition        [x ] Underweight / BMI <19        [ ] Morbid Obesity / BMI > 40      Findings as based on:  •  Comprehensive nutrition assessment and consultation  •  Calorie counts (nutrient intake analysis)  •  Food acceptance and intake status from observations by staff  •  Follow up  •  Patient education  •  Intervention secondary to interdisciplinary rounds  •   concerns      Treatment:    The following diet has been recommended:  Dysphagia 2 Mechanical Soft - Thin Liquids , Low sodium , consistent carbohydrate c evening snack , Glucerna Shake 3 x daily =660 calories, 30 grams protein       PROVIDER Section:     By signing this assessment you are acknowledging and agree with the diagnosis/diagnoses assigned by the Registered Dietitian    Comments:

## 2020-07-09 NOTE — DIETITIAN INITIAL EVALUATION ADULT. - NUTRITION DIAGNOSITC TERMINOLOGY #1
Called to schedule appt for f/u to discuss surgery. Currently scheduled with Dr Boy Mccabe on 1/6/20 for Left Colectomy. appt made for 1/29/20 [facility].   Future Appointments   Date Time Provider Molly Nicole   1/20/2020 10:00 AM Gladys Li MD [facility] HEM ON Malnutrition...

## 2020-07-09 NOTE — DIETITIAN INITIAL EVALUATION ADULT. - PERTINENT LABORATORY DATA
07-09 Na139 mmol/L Glu 108 mg/dL<H> K+ 4.2 mmol/L Cr  0.41 mg/dL<L> BUN 36 mg/dL<H> 07-06 Phos 3.6 mg/dL 07-07 Alb 3.1 g/dL<L>07-07 ALT 22 U/L AST 22 U/L Alkaline Phosphatase 121 U/L<H>  07/08/20 POCT blood Glucose range 118-213 mg/dL<WNL ->H>  06/09/20, A1C=5.6%

## 2020-07-10 LAB
CULTURE RESULTS: SIGNIFICANT CHANGE UP
SPECIMEN SOURCE: SIGNIFICANT CHANGE UP
SURGICAL PATHOLOGY STUDY: SIGNIFICANT CHANGE UP

## 2020-07-13 DIAGNOSIS — S72.002A FRACTURE OF UNSPECIFIED PART OF NECK OF LEFT FEMUR, INITIAL ENCOUNTER FOR CLOSED FRACTURE: ICD-10-CM

## 2020-07-13 DIAGNOSIS — E87.6 HYPOKALEMIA: ICD-10-CM

## 2020-07-13 DIAGNOSIS — F32.9 MAJOR DEPRESSIVE DISORDER, SINGLE EPISODE, UNSPECIFIED: ICD-10-CM

## 2020-07-13 DIAGNOSIS — F03.90 UNSPECIFIED DEMENTIA WITHOUT BEHAVIORAL DISTURBANCE: ICD-10-CM

## 2020-07-13 DIAGNOSIS — E43 UNSPECIFIED SEVERE PROTEIN-CALORIE MALNUTRITION: ICD-10-CM

## 2020-07-13 DIAGNOSIS — I50.30 UNSPECIFIED DIASTOLIC (CONGESTIVE) HEART FAILURE: ICD-10-CM

## 2020-07-13 DIAGNOSIS — I11.0 HYPERTENSIVE HEART DISEASE WITH HEART FAILURE: ICD-10-CM
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
## 2020-07-13 DIAGNOSIS — E78.5 HYPERLIPIDEMIA, UNSPECIFIED: ICD-10-CM

## 2020-07-13 DIAGNOSIS — Z85.6 PERSONAL HISTORY OF LEUKEMIA: ICD-10-CM

## 2020-07-13 DIAGNOSIS — E11.65 TYPE 2 DIABETES MELLITUS WITH HYPERGLYCEMIA: ICD-10-CM

## 2020-07-13 DIAGNOSIS — M32.9 SYSTEMIC LUPUS ERYTHEMATOSUS, UNSPECIFIED: ICD-10-CM

## 2020-07-13 DIAGNOSIS — I69.320 APHASIA FOLLOWING CEREBRAL INFARCTION: ICD-10-CM

## 2020-07-13 DIAGNOSIS — Z79.51 LONG TERM (CURRENT) USE OF INHALED STEROIDS: ICD-10-CM

## 2020-07-13 DIAGNOSIS — Z88.2 ALLERGY STATUS TO SULFONAMIDES: ICD-10-CM

## 2020-07-23 ENCOUNTER — OUTPATIENT (OUTPATIENT)
Dept: OUTPATIENT SERVICES | Facility: HOSPITAL | Age: 80
LOS: 1 days | Discharge: HOME | End: 2020-07-23
Payer: MEDICARE

## 2020-07-23 DIAGNOSIS — M25.552 PAIN IN LEFT HIP: ICD-10-CM

## 2020-07-23 DIAGNOSIS — Z98.49 CATARACT EXTRACTION STATUS, UNSPECIFIED EYE: Chronic | ICD-10-CM

## 2020-07-23 PROCEDURE — 73590 X-RAY EXAM OF LOWER LEG: CPT | Mod: 26,LT

## 2020-07-23 PROCEDURE — 73552 X-RAY EXAM OF FEMUR 2/>: CPT | Mod: 26,LT

## 2020-07-23 PROCEDURE — 73630 X-RAY EXAM OF FOOT: CPT | Mod: 26,LT

## 2020-07-23 PROCEDURE — 73502 X-RAY EXAM HIP UNI 2-3 VIEWS: CPT | Mod: 26,LT

## 2020-07-23 PROCEDURE — 73560 X-RAY EXAM OF KNEE 1 OR 2: CPT | Mod: 26,LT

## 2020-07-28 ENCOUNTER — INPATIENT (INPATIENT)
Facility: HOSPITAL | Age: 80
LOS: 2 days | Discharge: NURSING, REHAB OR LONG-TERM CARE | End: 2020-07-31
Attending: INTERNAL MEDICINE | Admitting: INTERNAL MEDICINE
Payer: MEDICARE

## 2020-07-28 VITALS
HEART RATE: 80 BPM | SYSTOLIC BLOOD PRESSURE: 158 MMHG | OXYGEN SATURATION: 96 % | TEMPERATURE: 98 F | HEIGHT: 64 IN | WEIGHT: 123.46 LBS | RESPIRATION RATE: 19 BRPM | DIASTOLIC BLOOD PRESSURE: 73 MMHG

## 2020-07-28 DIAGNOSIS — E43 UNSPECIFIED SEVERE PROTEIN-CALORIE MALNUTRITION: ICD-10-CM

## 2020-07-28 DIAGNOSIS — I50.32 CHRONIC DIASTOLIC (CONGESTIVE) HEART FAILURE: ICD-10-CM

## 2020-07-28 DIAGNOSIS — E11.9 TYPE 2 DIABETES MELLITUS WITHOUT COMPLICATIONS: ICD-10-CM

## 2020-07-28 DIAGNOSIS — I50.30 UNSPECIFIED DIASTOLIC (CONGESTIVE) HEART FAILURE: ICD-10-CM

## 2020-07-28 DIAGNOSIS — Z98.49 CATARACT EXTRACTION STATUS, UNSPECIFIED EYE: Chronic | ICD-10-CM

## 2020-07-28 DIAGNOSIS — K92.1 MELENA: ICD-10-CM

## 2020-07-28 DIAGNOSIS — D64.9 ANEMIA, UNSPECIFIED: ICD-10-CM

## 2020-07-28 DIAGNOSIS — E78.00 PURE HYPERCHOLESTEROLEMIA, UNSPECIFIED: ICD-10-CM

## 2020-07-28 LAB
ALBUMIN SERPL ELPH-MCNC: 2.5 G/DL — LOW (ref 3.3–5)
ALLERGY+IMMUNOLOGY DIAG STUDY NOTE: SIGNIFICANT CHANGE UP
ALP SERPL-CCNC: 119 U/L — SIGNIFICANT CHANGE UP (ref 40–120)
ALT FLD-CCNC: 28 U/L — SIGNIFICANT CHANGE UP (ref 12–78)
ANION GAP SERPL CALC-SCNC: 8 MMOL/L — SIGNIFICANT CHANGE UP (ref 5–17)
APTT BLD: 32.3 SEC — SIGNIFICANT CHANGE UP (ref 27.5–35.5)
AST SERPL-CCNC: 27 U/L — SIGNIFICANT CHANGE UP (ref 15–37)
BASOPHILS # BLD AUTO: 0.01 K/UL — SIGNIFICANT CHANGE UP (ref 0–0.2)
BASOPHILS NFR BLD AUTO: 0.3 % — SIGNIFICANT CHANGE UP (ref 0–2)
BILIRUB SERPL-MCNC: 0.3 MG/DL — SIGNIFICANT CHANGE UP (ref 0.2–1.2)
BLD GP AB SCN SERPL QL: SIGNIFICANT CHANGE UP
BUN SERPL-MCNC: 15 MG/DL — SIGNIFICANT CHANGE UP (ref 7–23)
CALCIUM SERPL-MCNC: 8.4 MG/DL — LOW (ref 8.5–10.1)
CHLORIDE SERPL-SCNC: 96 MMOL/L — SIGNIFICANT CHANGE UP (ref 96–108)
CO2 SERPL-SCNC: 24 MMOL/L — SIGNIFICANT CHANGE UP (ref 22–31)
CREAT SERPL-MCNC: 0.44 MG/DL — LOW (ref 0.5–1.3)
DAT IGG-SP REAG RBC-IMP: ABNORMAL
DIR ANTIGLOB POLYSPECIFIC INTERPRETATION: ABNORMAL
EOSINOPHIL # BLD AUTO: 0.02 K/UL — SIGNIFICANT CHANGE UP (ref 0–0.5)
EOSINOPHIL NFR BLD AUTO: 0.5 % — SIGNIFICANT CHANGE UP (ref 0–6)
GLUCOSE SERPL-MCNC: 123 MG/DL — HIGH (ref 70–99)
HCT VFR BLD CALC: 20 % — CRITICAL LOW (ref 34.5–45)
HGB BLD-MCNC: 6.3 G/DL — CRITICAL LOW (ref 11.5–15.5)
IAT COMP-SP REAG SERPL QL: SIGNIFICANT CHANGE UP
IMM GRANULOCYTES NFR BLD AUTO: 0.5 % — SIGNIFICANT CHANGE UP (ref 0–1.5)
INR BLD: 1.4 RATIO — HIGH (ref 0.88–1.16)
LIDOCAIN IGE QN: 67 U/L — LOW (ref 73–393)
LYMPHOCYTES # BLD AUTO: 1.29 K/UL — SIGNIFICANT CHANGE UP (ref 1–3.3)
LYMPHOCYTES # BLD AUTO: 33.5 % — SIGNIFICANT CHANGE UP (ref 13–44)
MCHC RBC-ENTMCNC: 25.3 PG — LOW (ref 27–34)
MCHC RBC-ENTMCNC: 31.5 GM/DL — LOW (ref 32–36)
MCV RBC AUTO: 80.3 FL — SIGNIFICANT CHANGE UP (ref 80–100)
MONOCYTES # BLD AUTO: 0.36 K/UL — SIGNIFICANT CHANGE UP (ref 0–0.9)
MONOCYTES NFR BLD AUTO: 9.4 % — SIGNIFICANT CHANGE UP (ref 2–14)
NEUTROPHILS # BLD AUTO: 2.15 K/UL — SIGNIFICANT CHANGE UP (ref 1.8–7.4)
NEUTROPHILS NFR BLD AUTO: 55.8 % — SIGNIFICANT CHANGE UP (ref 43–77)
NRBC # BLD: 0 /100 WBCS — SIGNIFICANT CHANGE UP (ref 0–0)
OB PNL STL: POSITIVE
PLATELET # BLD AUTO: 283 K/UL — SIGNIFICANT CHANGE UP (ref 150–400)
POTASSIUM SERPL-MCNC: 4.7 MMOL/L — SIGNIFICANT CHANGE UP (ref 3.5–5.3)
POTASSIUM SERPL-SCNC: 4.7 MMOL/L — SIGNIFICANT CHANGE UP (ref 3.5–5.3)
PROT SERPL-MCNC: 7.1 GM/DL — SIGNIFICANT CHANGE UP (ref 6–8.3)
PROTHROM AB SERPL-ACNC: 15.3 SEC — HIGH (ref 10.6–13.6)
RBC # BLD: 2.49 M/UL — LOW (ref 3.8–5.2)
RBC # FLD: 19.8 % — HIGH (ref 10.3–14.5)
SODIUM SERPL-SCNC: 128 MMOL/L — LOW (ref 135–145)
WBC # BLD: 3.85 K/UL — SIGNIFICANT CHANGE UP (ref 3.8–10.5)
WBC # FLD AUTO: 3.85 K/UL — SIGNIFICANT CHANGE UP (ref 3.8–10.5)

## 2020-07-28 PROCEDURE — 86077 PHYS BLOOD BANK SERV XMATCH: CPT

## 2020-07-28 PROCEDURE — 71045 X-RAY EXAM CHEST 1 VIEW: CPT | Mod: 26

## 2020-07-28 PROCEDURE — 99223 1ST HOSP IP/OBS HIGH 75: CPT | Mod: AI

## 2020-07-28 PROCEDURE — 99285 EMERGENCY DEPT VISIT HI MDM: CPT

## 2020-07-28 PROCEDURE — 93010 ELECTROCARDIOGRAM REPORT: CPT

## 2020-07-28 RX ORDER — FAMOTIDINE 10 MG/ML
20 INJECTION INTRAVENOUS ONCE
Refills: 0 | Status: COMPLETED | OUTPATIENT
Start: 2020-07-28 | End: 2020-07-28

## 2020-07-28 RX ORDER — LOSARTAN POTASSIUM 100 MG/1
100 TABLET, FILM COATED ORAL DAILY
Refills: 0 | Status: DISCONTINUED | OUTPATIENT
Start: 2020-07-28 | End: 2020-07-31

## 2020-07-28 RX ORDER — SERTRALINE 25 MG/1
1 TABLET, FILM COATED ORAL
Qty: 0 | Refills: 0 | DISCHARGE

## 2020-07-28 RX ORDER — ATENOLOL 25 MG/1
100 TABLET ORAL DAILY
Refills: 0 | Status: DISCONTINUED | OUTPATIENT
Start: 2020-07-28 | End: 2020-07-29

## 2020-07-28 RX ORDER — HYDROXYCHLOROQUINE SULFATE 200 MG
200 TABLET ORAL
Refills: 0 | Status: DISCONTINUED | OUTPATIENT
Start: 2020-07-28 | End: 2020-07-31

## 2020-07-28 RX ORDER — SERTRALINE 25 MG/1
100 TABLET, FILM COATED ORAL DAILY
Refills: 0 | Status: DISCONTINUED | OUTPATIENT
Start: 2020-07-28 | End: 2020-07-31

## 2020-07-28 RX ORDER — PANTOPRAZOLE SODIUM 20 MG/1
40 TABLET, DELAYED RELEASE ORAL ONCE
Refills: 0 | Status: COMPLETED | OUTPATIENT
Start: 2020-07-28 | End: 2020-07-28

## 2020-07-28 RX ORDER — FOLIC ACID 0.8 MG
1 TABLET ORAL DAILY
Refills: 0 | Status: DISCONTINUED | OUTPATIENT
Start: 2020-07-28 | End: 2020-07-31

## 2020-07-28 RX ORDER — BUDESONIDE AND FORMOTEROL FUMARATE DIHYDRATE 160; 4.5 UG/1; UG/1
2 AEROSOL RESPIRATORY (INHALATION)
Refills: 0 | Status: DISCONTINUED | OUTPATIENT
Start: 2020-07-28 | End: 2020-07-31

## 2020-07-28 RX ORDER — ASCORBIC ACID 60 MG
500 TABLET,CHEWABLE ORAL DAILY
Refills: 0 | Status: DISCONTINUED | OUTPATIENT
Start: 2020-07-28 | End: 2020-07-31

## 2020-07-28 RX ORDER — CHOLECALCIFEROL (VITAMIN D3) 125 MCG
400 CAPSULE ORAL DAILY
Refills: 0 | Status: DISCONTINUED | OUTPATIENT
Start: 2020-07-28 | End: 2020-07-31

## 2020-07-28 RX ORDER — PANTOPRAZOLE SODIUM 20 MG/1
40 TABLET, DELAYED RELEASE ORAL EVERY 12 HOURS
Refills: 0 | Status: DISCONTINUED | OUTPATIENT
Start: 2020-07-29 | End: 2020-07-31

## 2020-07-28 RX ORDER — HYDROXYCHLOROQUINE SULFATE 200 MG
0 TABLET ORAL
Qty: 0 | Refills: 0 | DISCHARGE

## 2020-07-28 RX ORDER — POLYETHYLENE GLYCOL 3350 17 G/17G
17 POWDER, FOR SOLUTION ORAL DAILY
Refills: 0 | Status: DISCONTINUED | OUTPATIENT
Start: 2020-07-28 | End: 2020-07-31

## 2020-07-28 RX ORDER — ACETAMINOPHEN 500 MG
650 TABLET ORAL EVERY 6 HOURS
Refills: 0 | Status: DISCONTINUED | OUTPATIENT
Start: 2020-07-28 | End: 2020-07-30

## 2020-07-28 RX ORDER — GABAPENTIN 400 MG/1
100 CAPSULE ORAL
Refills: 0 | Status: DISCONTINUED | OUTPATIENT
Start: 2020-07-28 | End: 2020-07-31

## 2020-07-28 RX ADMIN — PANTOPRAZOLE SODIUM 40 MILLIGRAM(S): 20 TABLET, DELAYED RELEASE ORAL at 23:48

## 2020-07-28 RX ADMIN — BUDESONIDE AND FORMOTEROL FUMARATE DIHYDRATE 2 PUFF(S): 160; 4.5 AEROSOL RESPIRATORY (INHALATION) at 18:26

## 2020-07-28 RX ADMIN — GABAPENTIN 100 MILLIGRAM(S): 400 CAPSULE ORAL at 18:26

## 2020-07-28 RX ADMIN — Medication 650 MILLIGRAM(S): at 23:48

## 2020-07-28 RX ADMIN — Medication 200 MILLIGRAM(S): at 18:26

## 2020-07-28 RX ADMIN — Medication 1 APPLICATION(S): at 21:16

## 2020-07-28 RX ADMIN — PANTOPRAZOLE SODIUM 40 MILLIGRAM(S): 20 TABLET, DELAYED RELEASE ORAL at 12:42

## 2020-07-28 RX ADMIN — FAMOTIDINE 20 MILLIGRAM(S): 10 INJECTION INTRAVENOUS at 10:30

## 2020-07-28 RX ADMIN — Medication 650 MILLIGRAM(S): at 18:25

## 2020-07-28 NOTE — ED ADULT NURSE NOTE - CHIEF COMPLAINT QUOTE
patient brought in from [facility] , as per nurse Meza , patient had an episode of melena last night , patient on Xarelto , Hg 5.6 , history of dementia

## 2020-07-28 NOTE — ED ADULT NURSE NOTE - OBJECTIVE STATEMENT
Pt received to bed 1 A&O x3, and drowsy. Pt transferred from [facility] due to low hgb, GI Bleed, and black stools. Pt co of pain in abdomen 9/10.

## 2020-07-28 NOTE — CONSULT NOTE ADULT - SUBJECTIVE AND OBJECTIVE BOX
full consult dictated    Plan: Pt with multiple medical problems - admitted with GI bleed; + melena.  Hgb 6.3.  Pt to be started on IV protonix; Transfuse 3u prbc's; NPO.  Repeat labs.  EGD when stable.

## 2020-07-28 NOTE — H&P ADULT - NSHPPHYSICALEXAM_GEN_ALL_CORE
ICU Vital Signs Last 24 Hrs  T(C): 36.4 (28 Jul 2020 11:43), Max: 36.7 (28 Jul 2020 09:42)  T(F): 97.6 (28 Jul 2020 11:43), Max: 98 (28 Jul 2020 09:42)  HR: 73 (28 Jul 2020 11:43) (73 - 80)  BP: 150/65 (28 Jul 2020 11:43) (150/65 - 158/73)  BP(mean): --  ABP: --  ABP(mean): --  RR: 20 (28 Jul 2020 11:43) (19 - 20)  SpO2: 99% (28 Jul 2020 11:43) (96% - 99%)  GENERAL: NAD well-developed  HEAD:  Atraumatic, Normocephalic  EYES: EOMI, PERRLA, conjunctiva and sclera clear  ENMT: No tonsillar erythema, exudates, or enlargement; Moist mucous membranes, Good dentition, No lesions  NECK: Supple, No JVD, Normal thyroid  NERVOUS SYSTEM:  Alert & Oriented X3, Good concentration; Motor Strength 5/5 B/L upper and lower extremities; DTRs 2+ intact and symmetric  CHEST/LUNG: Clear to percussion bilaterally; No rales, rhonchi, wheezing, or rubs  HEART: Regular rate and rhythm; No murmurs, rubs, or gallops  ABDOMEN: Soft, Nontender, Nondistended; Bowel sounds present  EXTREMITIES:  2+ Peripheral Pulses, No clubbing, cyanosis, or edema  LYMPH: No lymphadenopathy   SKIN: No rashes or lesions

## 2020-07-28 NOTE — ED PROVIDER NOTE - OBJECTIVE STATEMENT
80yo F w/ PMH dementia, copd, dm, htn, gerd, lupus sent to ED from [facility] d/t hgb of 5.8, report of melena, r/o GIB. Pt is in rehab s/p hip fx. Pt is poor historian 2/2 dementia. Endorsing feeling so-so, epigastric discomfort.     DNR/DNI.

## 2020-07-28 NOTE — ED PROVIDER NOTE - PHYSICAL EXAMINATION
GEN: Awake, alert, frail appearing, in NAD.   HEAD AND NECK: NC/AT. Airway patent. Neck supple.   EYES:  Clear b/l. EOMI. PERRL.   ENT: Moist mucus membranes.   CARDIAC: Regular rate, regular rhythm. No evident pedal edema.    RESP/CHEST: Normal respiratory effort with no use of accessory muscles or retractions. Clear throughout on auscultation.  ABD: Soft, TTP epigastrium. No rebound, no guarding.   BACK: No midline spinal TTP. No CVAT.   EXTREMITIES: Moving all extremities with no apparent deformities.   SKIN: Warm, dry, intact normal color. No rash.   NEURO: AOx3, CN II-XII grossly intact, no focal deficits.   PSYCH: Appropriate mood and affect.

## 2020-07-28 NOTE — ED ADULT NURSE REASSESSMENT NOTE - NS ED NURSE REASSESS COMMENT FT1
Verbal consent obtained from Vinnie Kimble who is the patients son and HCP @ [phone], pt was educated about the need for a blood transfusion at this time, son consents to the transfusion and Dr. Humphreys also spoke with the patients son to confirm that blood may be used to treat his mom. Consent filled out by MD and placed in the EMR, witnessed. Pts son educated about the risks and benefits of transfusion and the risk of not having a transfusion, pts sons education deemed successful after teach back shows proficiency.

## 2020-07-28 NOTE — ED CLERICAL - NS ED CLERK NOTE PRE-ARRIVAL INFORMATION; ADDITIONAL PRE-ARRIVAL INFORMATION
This patient is enrolled in the comprehensive joint replacement (CJR) program and has active care navigation.  This patient can be followed up by the care navigation team within 24 hours. To arrange close follow-up or to obtain additional clinical information about this patient, please call the care navigator contact number above. Please page the Orthopedic Resident ([phone]) or Ortho PA at [phone] for input and/or consultation PRIOR to admission. For patients previously on the Hospitalist Service please page the Admitting Hospitalist as listed or the Covering Hospitalist at [phone] for input and/or consultation PRIOR to admission. If the patient was previously on a Voluntary Physician’s service please contact that physician for input and/or consultation PRIOR to admission.

## 2020-07-28 NOTE — ED PROVIDER NOTE - PROGRESS NOTE DETAILS
Spoke w/ dr mooney, admit to Hospital - dr rojas, dr rowan for GI - EGD Spoke w/ Dr Link, admit to Hospital - Dr Lazcano, Dr Diaz for GI - EGD

## 2020-07-28 NOTE — ED ADULT NURSE REASSESSMENT NOTE - NS ED NURSE REASSESS COMMENT FT1
spoke to tigre in blood bank, pt has history of antigens, may take hours to get blood transfusion. md montana made awake, no acute distress noted. ongoing assess.

## 2020-07-28 NOTE — H&P ADULT - ASSESSMENT
79 years old female with ugib with synmptomatic anemia     IMPROVE VTE Individual Risk Assessment          RISK                                                          Points  [  ] Previous VTE                                                3  [  ] Thrombophilia                                             2  [  ] Lower limb paralysis                                   2        (unable to hold up >15 seconds)    [  ] Current Cancer                                             2         (within 6 months)  [  ] Immobilization > 24 hrs                              1  [  ] ICU/CCU stay > 24 hours                             1  [  ] Age > 60                                                         1    IMPROVE VTE Score: 2

## 2020-07-28 NOTE — H&P ADULT - HISTORY OF PRESENT ILLNESS
80yo F w/ PMH dementia, copd, dm, htn, gerd, lupus sent to ED from [facility] d/t hgb of 5.8, report of melena, r/o GIB. Pt is in rehab s/p hip fx. Pt is poor historian 2/2 dementia. Endorsing feeling so-so, epigastric discomfort. 	DNR/DNI.

## 2020-07-28 NOTE — ED PROVIDER NOTE - CLINICAL SUMMARY MEDICAL DECISION MAKING FREE TEXT BOX
78yo F w/ complex PMH, dementia from [facility] pw melena, Hgb 5.8, on AC. AFVSS. TTP epigastrium. Plan: Obtain EKG, CXR, H/H, basic labs, lipase, coags, occult stool, T&S, w/u UGIB. Re-eval. 78yo F w/ complex PMH, dementia from [facility] pw melena, Hgb 5.8, on AC. AFVSS. TTP epigastrium. Plan: Obtain EKG, CXR, H/H, basic labs, lipase, coags, occult stool, T&S, w/u UGIB. Re-eval.  Hgb 6.3, 1u PRBC ordered (telephone consent fr son, Vinnie Kimble). Occult stool +, Pepcid, Protonix given. D/w Dr Link, will admit to Medicine (Dr Lazcano), consult Dr Diaz (GI) for inpatient EGD.

## 2020-07-28 NOTE — PATIENT PROFILE ADULT - NSPROPTRIGHTREPPHONE_GEN_A_NUR
Is This A New Presentation, Or A Follow-Up?: Skin Lesion How Severe Is Your Skin Lesion?: moderate Has Your Skin Lesion Been Treated?: not been treated [phone]

## 2020-07-28 NOTE — ED ADULT TRIAGE NOTE - TEMPERATURE IN CELSIUS (DEGREES C)
Comment: maintain cessation    Alcohol use No      Comment: \"not for a few months\"    Drug use: No    Sexual activity: Not Currently      Comment:      Other Topics Concern    Not on file     Social History Narrative    Lives alone in an apt. [facility]. Nephew, Zain Lyman, checks on pt weekly, takes to all medical appointments. Niece, Hoa Henriquez, accompanies pt to visits    Lived in [place] since 2010        Born and raised in [place], moved to [place] when 6, then lived in [facility] late [id number], in [place] until 2007, back to [place] in 2007, then returned here in 2010    Brother, Daniel Denton is in [place] [address]     Family History   Problem Relation Age of Onset    Other Mother     Other Father      EXAM:  Comfortable    Heart RRR    Lungs Clear    Abd soft with nl BS; no masses    Ext L BKA healed but edematous     R knee flexion contracture (30 degrees)     R foot and calf 3-4+ woody edema     Dime sized granulating wound anterior R shin     R lateral heel wound - granulating 5x3 cm     Unable to appreciate pulses by palpation or doppler    GSS TODAY at [facility] - graft patent with good velocities    IMP: 1) S/P R fem-PT bypass. Patent by graft scan   2) Severe R leg edema - multifactorial   3) R shin wound - related to edema   4) R heel decubitus wound - healing   5) Nonambulatory    REC: Unna boot to RLE and elevation. Will control edema and possibly speed healing. To see Dr. Jeri Liriano as scheduled next week with her continued management. F/U with me in 6 months for GSS. Continue Coumadin.
36.7

## 2020-07-28 NOTE — ED ADULT TRIAGE NOTE - CHIEF COMPLAINT QUOTE
patient brought in from [facility] , as per nurse Meza , patient had an episode of melena last night , patient on Xarelto , Hg 5.6 , history of dementia

## 2020-07-29 DIAGNOSIS — G30.1 ALZHEIMER'S DISEASE WITH LATE ONSET: ICD-10-CM

## 2020-07-29 DIAGNOSIS — Z29.9 ENCOUNTER FOR PROPHYLACTIC MEASURES, UNSPECIFIED: ICD-10-CM

## 2020-07-29 DIAGNOSIS — M32.9 SYSTEMIC LUPUS ERYTHEMATOSUS, UNSPECIFIED: ICD-10-CM

## 2020-07-29 LAB
ANION GAP SERPL CALC-SCNC: 7 MMOL/L — SIGNIFICANT CHANGE UP (ref 5–17)
ANION GAP SERPL CALC-SCNC: 8 MMOL/L — SIGNIFICANT CHANGE UP (ref 5–17)
BUN SERPL-MCNC: 13 MG/DL — SIGNIFICANT CHANGE UP (ref 7–23)
BUN SERPL-MCNC: 14 MG/DL — SIGNIFICANT CHANGE UP (ref 7–23)
CALCIUM SERPL-MCNC: 8 MG/DL — LOW (ref 8.5–10.1)
CALCIUM SERPL-MCNC: 8.2 MG/DL — LOW (ref 8.5–10.1)
CHLORIDE SERPL-SCNC: 100 MMOL/L — SIGNIFICANT CHANGE UP (ref 96–108)
CHLORIDE SERPL-SCNC: 101 MMOL/L — SIGNIFICANT CHANGE UP (ref 96–108)
CO2 SERPL-SCNC: 22 MMOL/L — SIGNIFICANT CHANGE UP (ref 22–31)
CO2 SERPL-SCNC: 24 MMOL/L — SIGNIFICANT CHANGE UP (ref 22–31)
CREAT SERPL-MCNC: 0.39 MG/DL — LOW (ref 0.5–1.3)
CREAT SERPL-MCNC: 0.49 MG/DL — LOW (ref 0.5–1.3)
GLUCOSE BLDC GLUCOMTR-MCNC: 79 MG/DL — SIGNIFICANT CHANGE UP (ref 70–99)
GLUCOSE BLDC GLUCOMTR-MCNC: 94 MG/DL — SIGNIFICANT CHANGE UP (ref 70–99)
GLUCOSE SERPL-MCNC: 67 MG/DL — LOW (ref 70–99)
GLUCOSE SERPL-MCNC: 76 MG/DL — SIGNIFICANT CHANGE UP (ref 70–99)
HCT VFR BLD CALC: 18.2 % — CRITICAL LOW (ref 34.5–45)
HCT VFR BLD CALC: 31.8 % — LOW (ref 34.5–45)
HGB BLD-MCNC: 10.6 G/DL — LOW (ref 11.5–15.5)
HGB BLD-MCNC: 5.6 G/DL — CRITICAL LOW (ref 11.5–15.5)
MCHC RBC-ENTMCNC: 24.5 PG — LOW (ref 27–34)
MCHC RBC-ENTMCNC: 26.9 PG — LOW (ref 27–34)
MCHC RBC-ENTMCNC: 30.8 GM/DL — LOW (ref 32–36)
MCHC RBC-ENTMCNC: 33.3 GM/DL — SIGNIFICANT CHANGE UP (ref 32–36)
MCV RBC AUTO: 79.5 FL — LOW (ref 80–100)
MCV RBC AUTO: 80.7 FL — SIGNIFICANT CHANGE UP (ref 80–100)
NRBC # BLD: 0 /100 WBCS — SIGNIFICANT CHANGE UP (ref 0–0)
NRBC # BLD: 0 /100 WBCS — SIGNIFICANT CHANGE UP (ref 0–0)
PLATELET # BLD AUTO: 217 K/UL — SIGNIFICANT CHANGE UP (ref 150–400)
PLATELET # BLD AUTO: 224 K/UL — SIGNIFICANT CHANGE UP (ref 150–400)
POTASSIUM SERPL-MCNC: 4.1 MMOL/L — SIGNIFICANT CHANGE UP (ref 3.5–5.3)
POTASSIUM SERPL-MCNC: 4.5 MMOL/L — SIGNIFICANT CHANGE UP (ref 3.5–5.3)
POTASSIUM SERPL-SCNC: 4.1 MMOL/L — SIGNIFICANT CHANGE UP (ref 3.5–5.3)
POTASSIUM SERPL-SCNC: 4.5 MMOL/L — SIGNIFICANT CHANGE UP (ref 3.5–5.3)
RBC # BLD: 2.29 M/UL — LOW (ref 3.8–5.2)
RBC # BLD: 3.94 M/UL — SIGNIFICANT CHANGE UP (ref 3.8–5.2)
RBC # FLD: 16.3 % — HIGH (ref 10.3–14.5)
RBC # FLD: 19.5 % — HIGH (ref 10.3–14.5)
SARS-COV-2 IGG SERPL QL IA: NEGATIVE — SIGNIFICANT CHANGE UP
SARS-COV-2 IGM SERPL IA-ACNC: <0.1 INDEX — SIGNIFICANT CHANGE UP
SARS-COV-2 RNA SPEC QL NAA+PROBE: SIGNIFICANT CHANGE UP
SODIUM SERPL-SCNC: 130 MMOL/L — LOW (ref 135–145)
SODIUM SERPL-SCNC: 132 MMOL/L — LOW (ref 135–145)
WBC # BLD: 3.54 K/UL — LOW (ref 3.8–10.5)
WBC # BLD: 3.82 K/UL — SIGNIFICANT CHANGE UP (ref 3.8–10.5)
WBC # FLD AUTO: 3.54 K/UL — LOW (ref 3.8–10.5)
WBC # FLD AUTO: 3.82 K/UL — SIGNIFICANT CHANGE UP (ref 3.8–10.5)

## 2020-07-29 PROCEDURE — 99233 SBSQ HOSP IP/OBS HIGH 50: CPT

## 2020-07-29 RX ORDER — DEXTROSE 50 % IN WATER 50 %
25 SYRINGE (ML) INTRAVENOUS ONCE
Refills: 0 | Status: COMPLETED | OUTPATIENT
Start: 2020-07-29 | End: 2020-07-29

## 2020-07-29 RX ORDER — ATENOLOL 25 MG/1
50 TABLET ORAL DAILY
Refills: 0 | Status: DISCONTINUED | OUTPATIENT
Start: 2020-07-29 | End: 2020-07-30

## 2020-07-29 RX ADMIN — Medication 500 MILLIGRAM(S): at 11:55

## 2020-07-29 RX ADMIN — Medication 1 APPLICATION(S): at 13:56

## 2020-07-29 RX ADMIN — Medication 650 MILLIGRAM(S): at 05:19

## 2020-07-29 RX ADMIN — PANTOPRAZOLE SODIUM 40 MILLIGRAM(S): 20 TABLET, DELAYED RELEASE ORAL at 11:54

## 2020-07-29 RX ADMIN — ATENOLOL 50 MILLIGRAM(S): 25 TABLET ORAL at 13:55

## 2020-07-29 RX ADMIN — GABAPENTIN 100 MILLIGRAM(S): 400 CAPSULE ORAL at 05:19

## 2020-07-29 RX ADMIN — GABAPENTIN 100 MILLIGRAM(S): 400 CAPSULE ORAL at 17:44

## 2020-07-29 RX ADMIN — Medication 650 MILLIGRAM(S): at 11:54

## 2020-07-29 RX ADMIN — SERTRALINE 100 MILLIGRAM(S): 25 TABLET, FILM COATED ORAL at 14:35

## 2020-07-29 RX ADMIN — POLYETHYLENE GLYCOL 3350 17 GRAM(S): 17 POWDER, FOR SOLUTION ORAL at 11:55

## 2020-07-29 RX ADMIN — Medication 1 MILLIGRAM(S): at 11:55

## 2020-07-29 RX ADMIN — Medication 1 APPLICATION(S): at 22:01

## 2020-07-29 RX ADMIN — Medication 25 MILLILITER(S): at 01:49

## 2020-07-29 RX ADMIN — Medication 1 APPLICATION(S): at 05:20

## 2020-07-29 RX ADMIN — Medication 200 MILLIGRAM(S): at 05:19

## 2020-07-29 RX ADMIN — BUDESONIDE AND FORMOTEROL FUMARATE DIHYDRATE 2 PUFF(S): 160; 4.5 AEROSOL RESPIRATORY (INHALATION) at 17:45

## 2020-07-29 RX ADMIN — Medication 650 MILLIGRAM(S): at 17:45

## 2020-07-29 RX ADMIN — Medication 200 MILLIGRAM(S): at 17:43

## 2020-07-29 RX ADMIN — LOSARTAN POTASSIUM 100 MILLIGRAM(S): 100 TABLET, FILM COATED ORAL at 05:19

## 2020-07-29 RX ADMIN — BUDESONIDE AND FORMOTEROL FUMARATE DIHYDRATE 2 PUFF(S): 160; 4.5 AEROSOL RESPIRATORY (INHALATION) at 05:19

## 2020-07-29 RX ADMIN — Medication 650 MILLIGRAM(S): at 01:48

## 2020-07-29 RX ADMIN — Medication 400 UNIT(S): at 17:44

## 2020-07-29 NOTE — PROGRESS NOTE ADULT - SUBJECTIVE AND OBJECTIVE BOX
Pt received 1u prbcs - 3 units have been ordered  still severely anemic  Pt is confused      MEDICATIONS  (STANDING):  acetaminophen   Tablet .. 650 milliGRAM(s) Oral every 6 hours  ascorbic acid 500 milliGRAM(s) Oral daily  ATENolol  Tablet 50 milliGRAM(s) Oral daily  budesonide 160 MICROgram(s)/formoterol 4.5 MICROgram(s) Inhaler 2 Puff(s) Inhalation two times a day  cholecalciferol 400 Unit(s) Oral daily  folic acid 1 milliGRAM(s) Oral daily  gabapentin 100 milliGRAM(s) Oral two times a day  hydroxychloroquine 200 milliGRAM(s) Oral two times a day  losartan 100 milliGRAM(s) Oral daily  pantoprazole  Injectable 40 milliGRAM(s) IV Push every 12 hours  petrolatum Ophthalmic Ointment 1 Application(s) Both EYES three times a day  polyethylene glycol 3350 17 Gram(s) Oral daily  sertraline 100 milliGRAM(s) Oral daily    MEDICATIONS  (PRN):  artificial tears (preservative free) Ophthalmic Solution 1 Drop(s) Both EYES three times a day PRN Dry Eyes      Allergies    SULFA (Unknown)  sulfa drugs (Unknown)    Intolerances        Vital Signs Last 24 Hrs  T(C): 36.1 (29 Jul 2020 10:37), Max: 36.7 (29 Jul 2020 05:50)  T(F): 97 (29 Jul 2020 10:37), Max: 98 (29 Jul 2020 05:50)  HR: 60 (29 Jul 2020 10:37) (53 - 68)  BP: 158/73 (29 Jul 2020 10:37) (115/63 - 165/77)  BP(mean): --  RR: 16 (29 Jul 2020 10:37) (15 - 18)  SpO2: 100% (29 Jul 2020 10:37) (98% - 100%)    PHYSICAL EXAM:  General: NAD.  CVS: S1, S2  Chest: air entry bilaterally present  Abd: BS present, soft, non-tender      LABS:                        5.6    3.82  )-----------( 217      ( 28 Jul 2020 23:58 )             18.2     07-29    130<L>  |  101  |  13  ----------------------------<  76  4.5   |  22  |  0.49<L>    Ca    8.2<L>      29 Jul 2020 08:28    TPro  7.1  /  Alb  2.5<L>  /  TBili  0.3  /  DBili  x   /  AST  27  /  ALT  28  /  AlkPhos  119  07-28    PT/INR - ( 28 Jul 2020 10:25 )   PT: 15.3 sec;   INR: 1.40 ratio         PTT - ( 28 Jul 2020 10:25 )  PTT:32.3 sec    transfuse PRBC's ASAP  hold off on endoscopy until labs are stable

## 2020-07-29 NOTE — PROGRESS NOTE ADULT - SUBJECTIVE AND OBJECTIVE BOX
Patient is a 79y old  Female who presents with a chief complaint of     INTERVAL HPI/ OVERNIGHT EVENTS: Pt was seen and examined at bedside today, pt is confused, no bleeding seen from nursing staff.      MEDICATIONS  (STANDING):  acetaminophen   Tablet .. 650 milliGRAM(s) Oral every 6 hours  ascorbic acid 500 milliGRAM(s) Oral daily  ATENolol  Tablet 50 milliGRAM(s) Oral daily  budesonide 160 MICROgram(s)/formoterol 4.5 MICROgram(s) Inhaler 2 Puff(s) Inhalation two times a day  cholecalciferol 400 Unit(s) Oral daily  folic acid 1 milliGRAM(s) Oral daily  gabapentin 100 milliGRAM(s) Oral two times a day  hydroxychloroquine 200 milliGRAM(s) Oral two times a day  losartan 100 milliGRAM(s) Oral daily  pantoprazole  Injectable 40 milliGRAM(s) IV Push every 12 hours  petrolatum Ophthalmic Ointment 1 Application(s) Both EYES three times a day  polyethylene glycol 3350 17 Gram(s) Oral daily  sertraline 100 milliGRAM(s) Oral daily    MEDICATIONS  (PRN):  artificial tears (preservative free) Ophthalmic Solution 1 Drop(s) Both EYES three times a day PRN Dry Eyes      Allergies    SULFA (Unknown)  sulfa drugs (Unknown)    Intolerances        REVIEW OF SYSTEMS:    Unable to examine due to [ ] Encephalopathy [x ] Advanced Dementia [ ] Expressive Aphasia [ ] Non-verbal patient        Vital Signs Last 24 Hrs  T(C): 36.1 (29 Jul 2020 10:37), Max: 36.7 (29 Jul 2020 05:50)  T(F): 97 (29 Jul 2020 10:37), Max: 98 (29 Jul 2020 05:50)  HR: 60 (29 Jul 2020 10:37) (53 - 65)  BP: 158/73 (29 Jul 2020 10:37) (115/63 - 165/77)  BP(mean): --  RR: 16 (29 Jul 2020 10:37) (16 - 18)  SpO2: 100% (29 Jul 2020 10:37) (98% - 100%)    PHYSICAL EXAM:  GENERAL: NAD, well-developed, well-groomed  HEAD:  Atraumatic, Normocephalic  EYES: conjunctiva and sclera clear  ENMT: Moist mucous membranes  NECK: Supple, No JVD, Normal thyroid  CHEST/LUNG: Clear to Auscultation bilaterally; No rales, rhonchi, wheezing, or rubs  HEART: Regular rate and rhythm; No murmurs, rubs, or gallops  ABDOMEN: Soft, Nontender, Nondistended; Bowel sounds present  EXTREMITIES:  2+ Peripheral Pulses, No clubbing, cyanosis, or edema  SKIN: No rashes or lesions  NERVOUS SYSTEM:  confused    LABS:                        5.6    3.82  )-----------( 217      ( 28 Jul 2020 23:58 )             18.2     07-29    130<L>  |  101  |  13  ----------------------------<  76  4.5   |  22  |  0.49<L>    Ca    8.2<L>      29 Jul 2020 08:28    TPro  7.1  /  Alb  2.5<L>  /  TBili  0.3  /  DBili  x   /  AST  27  /  ALT  28  /  AlkPhos  119  07-28    PT/INR - ( 28 Jul 2020 10:25 )   PT: 15.3 sec;   INR: 1.40 ratio         PTT - ( 28 Jul 2020 10:25 )  PTT:32.3 sec    CAPILLARY BLOOD GLUCOSE      POCT Blood Glucose.: 94 mg/dL (29 Jul 2020 04:58)  POCT Blood Glucose.: 79 mg/dL (29 Jul 2020 01:31)          RADIOLOGY & ADDITIONAL TESTS:          Imaging Personally Reviewed:  [ ] YES  [ ] NO    Consultant(s) Notes Reviewed:  [ ] YES  [ ] NO    Care Discussed with Consultants/Other Providers [x ] YES  [ ] NO

## 2020-07-30 LAB
ANION GAP SERPL CALC-SCNC: 7 MMOL/L — SIGNIFICANT CHANGE UP (ref 5–17)
BUN SERPL-MCNC: 11 MG/DL — SIGNIFICANT CHANGE UP (ref 7–23)
CALCIUM SERPL-MCNC: 8 MG/DL — LOW (ref 8.5–10.1)
CHLORIDE SERPL-SCNC: 101 MMOL/L — SIGNIFICANT CHANGE UP (ref 96–108)
CO2 SERPL-SCNC: 23 MMOL/L — SIGNIFICANT CHANGE UP (ref 22–31)
CREAT SERPL-MCNC: 0.33 MG/DL — LOW (ref 0.5–1.3)
GLUCOSE BLDC GLUCOMTR-MCNC: 123 MG/DL — HIGH (ref 70–99)
GLUCOSE BLDC GLUCOMTR-MCNC: 59 MG/DL — LOW (ref 70–99)
GLUCOSE BLDC GLUCOMTR-MCNC: 63 MG/DL — LOW (ref 70–99)
GLUCOSE BLDC GLUCOMTR-MCNC: 66 MG/DL — LOW (ref 70–99)
GLUCOSE BLDC GLUCOMTR-MCNC: 76 MG/DL — SIGNIFICANT CHANGE UP (ref 70–99)
GLUCOSE BLDC GLUCOMTR-MCNC: 79 MG/DL — SIGNIFICANT CHANGE UP (ref 70–99)
GLUCOSE BLDC GLUCOMTR-MCNC: 82 MG/DL — SIGNIFICANT CHANGE UP (ref 70–99)
GLUCOSE SERPL-MCNC: 70 MG/DL — SIGNIFICANT CHANGE UP (ref 70–99)
HCT VFR BLD CALC: 33.6 % — LOW (ref 34.5–45)
HGB BLD-MCNC: 10.8 G/DL — LOW (ref 11.5–15.5)
MCHC RBC-ENTMCNC: 26.3 PG — LOW (ref 27–34)
MCHC RBC-ENTMCNC: 32.1 GM/DL — SIGNIFICANT CHANGE UP (ref 32–36)
MCV RBC AUTO: 82 FL — SIGNIFICANT CHANGE UP (ref 80–100)
NRBC # BLD: 0 /100 WBCS — SIGNIFICANT CHANGE UP (ref 0–0)
PLATELET # BLD AUTO: 208 K/UL — SIGNIFICANT CHANGE UP (ref 150–400)
POTASSIUM SERPL-MCNC: 3.8 MMOL/L — SIGNIFICANT CHANGE UP (ref 3.5–5.3)
POTASSIUM SERPL-SCNC: 3.8 MMOL/L — SIGNIFICANT CHANGE UP (ref 3.5–5.3)
RBC # BLD: 4.1 M/UL — SIGNIFICANT CHANGE UP (ref 3.8–5.2)
RBC # FLD: 16.6 % — HIGH (ref 10.3–14.5)
SODIUM SERPL-SCNC: 131 MMOL/L — LOW (ref 135–145)
WBC # BLD: 3.82 K/UL — SIGNIFICANT CHANGE UP (ref 3.8–10.5)
WBC # FLD AUTO: 3.82 K/UL — SIGNIFICANT CHANGE UP (ref 3.8–10.5)

## 2020-07-30 PROCEDURE — 99233 SBSQ HOSP IP/OBS HIGH 50: CPT

## 2020-07-30 RX ORDER — GLUCAGON INJECTION, SOLUTION 0.5 MG/.1ML
1 INJECTION, SOLUTION SUBCUTANEOUS ONCE
Refills: 0 | Status: DISCONTINUED | OUTPATIENT
Start: 2020-07-30 | End: 2020-07-31

## 2020-07-30 RX ORDER — DEXTROSE 50 % IN WATER 50 %
25 SYRINGE (ML) INTRAVENOUS ONCE
Refills: 0 | Status: DISCONTINUED | OUTPATIENT
Start: 2020-07-30 | End: 2020-07-31

## 2020-07-30 RX ORDER — SODIUM CHLORIDE 9 MG/ML
1000 INJECTION, SOLUTION INTRAVENOUS
Refills: 0 | Status: DISCONTINUED | OUTPATIENT
Start: 2020-07-30 | End: 2020-07-31

## 2020-07-30 RX ORDER — DEXTROSE 50 % IN WATER 50 %
50 SYRINGE (ML) INTRAVENOUS ONCE
Refills: 0 | Status: COMPLETED | OUTPATIENT
Start: 2020-07-30 | End: 2020-07-30

## 2020-07-30 RX ORDER — DEXTROSE 50 % IN WATER 50 %
15 SYRINGE (ML) INTRAVENOUS ONCE
Refills: 0 | Status: DISCONTINUED | OUTPATIENT
Start: 2020-07-30 | End: 2020-07-31

## 2020-07-30 RX ORDER — ACETAMINOPHEN 500 MG
650 TABLET ORAL EVERY 6 HOURS
Refills: 0 | Status: DISCONTINUED | OUTPATIENT
Start: 2020-07-30 | End: 2020-07-31

## 2020-07-30 RX ORDER — INSULIN LISPRO 100/ML
VIAL (ML) SUBCUTANEOUS EVERY 6 HOURS
Refills: 0 | Status: DISCONTINUED | OUTPATIENT
Start: 2020-07-30 | End: 2020-07-31

## 2020-07-30 RX ORDER — HYDRALAZINE HCL 50 MG
10 TABLET ORAL THREE TIMES A DAY
Refills: 0 | Status: DISCONTINUED | OUTPATIENT
Start: 2020-07-30 | End: 2020-07-31

## 2020-07-30 RX ORDER — DEXTROSE 50 % IN WATER 50 %
12.5 SYRINGE (ML) INTRAVENOUS ONCE
Refills: 0 | Status: DISCONTINUED | OUTPATIENT
Start: 2020-07-30 | End: 2020-07-31

## 2020-07-30 RX ADMIN — ATENOLOL 50 MILLIGRAM(S): 25 TABLET ORAL at 05:14

## 2020-07-30 RX ADMIN — GABAPENTIN 100 MILLIGRAM(S): 400 CAPSULE ORAL at 19:44

## 2020-07-30 RX ADMIN — Medication 200 MILLIGRAM(S): at 05:14

## 2020-07-30 RX ADMIN — Medication 650 MILLIGRAM(S): at 00:50

## 2020-07-30 RX ADMIN — BUDESONIDE AND FORMOTEROL FUMARATE DIHYDRATE 2 PUFF(S): 160; 4.5 AEROSOL RESPIRATORY (INHALATION) at 06:42

## 2020-07-30 RX ADMIN — Medication 10 MILLIGRAM(S): at 21:20

## 2020-07-30 RX ADMIN — Medication 10 MILLIGRAM(S): at 14:31

## 2020-07-30 RX ADMIN — SERTRALINE 100 MILLIGRAM(S): 25 TABLET, FILM COATED ORAL at 11:53

## 2020-07-30 RX ADMIN — Medication 200 MILLIGRAM(S): at 19:43

## 2020-07-30 RX ADMIN — PANTOPRAZOLE SODIUM 40 MILLIGRAM(S): 20 TABLET, DELAYED RELEASE ORAL at 00:13

## 2020-07-30 RX ADMIN — Medication 650 MILLIGRAM(S): at 00:13

## 2020-07-30 RX ADMIN — Medication 1 APPLICATION(S): at 14:32

## 2020-07-30 RX ADMIN — PANTOPRAZOLE SODIUM 40 MILLIGRAM(S): 20 TABLET, DELAYED RELEASE ORAL at 11:53

## 2020-07-30 RX ADMIN — Medication 650 MILLIGRAM(S): at 05:14

## 2020-07-30 RX ADMIN — LOSARTAN POTASSIUM 100 MILLIGRAM(S): 100 TABLET, FILM COATED ORAL at 05:14

## 2020-07-30 RX ADMIN — Medication 1 APPLICATION(S): at 05:14

## 2020-07-30 RX ADMIN — Medication 500 MILLIGRAM(S): at 11:53

## 2020-07-30 RX ADMIN — POLYETHYLENE GLYCOL 3350 17 GRAM(S): 17 POWDER, FOR SOLUTION ORAL at 11:53

## 2020-07-30 RX ADMIN — Medication 650 MILLIGRAM(S): at 06:35

## 2020-07-30 RX ADMIN — BUDESONIDE AND FORMOTEROL FUMARATE DIHYDRATE 2 PUFF(S): 160; 4.5 AEROSOL RESPIRATORY (INHALATION) at 19:43

## 2020-07-30 RX ADMIN — GABAPENTIN 100 MILLIGRAM(S): 400 CAPSULE ORAL at 05:14

## 2020-07-30 RX ADMIN — Medication 400 UNIT(S): at 11:53

## 2020-07-30 RX ADMIN — PANTOPRAZOLE SODIUM 40 MILLIGRAM(S): 20 TABLET, DELAYED RELEASE ORAL at 23:39

## 2020-07-30 RX ADMIN — Medication 50 MILLILITER(S): at 00:34

## 2020-07-30 RX ADMIN — Medication 1 APPLICATION(S): at 21:20

## 2020-07-30 RX ADMIN — Medication 1 MILLIGRAM(S): at 11:53

## 2020-07-30 NOTE — PHYSICAL THERAPY INITIAL EVALUATION ADULT - TRANSFER TRAINING, PT EVAL
Pt will perform sit to stand to sit transfers without LOB using rolling walker c min assist x1 by 4 weeks

## 2020-07-30 NOTE — PROGRESS NOTE ADULT - PROBLEM SELECTOR PLAN 1
Acute blood loss anemia secondary to GIB   continue with blood transfusion   cont to monitor h/h
Acute blood loss anemia secondary to GIB   s/p 3U pRBC blood transfusion   cont to monitor h/h

## 2020-07-30 NOTE — PHYSICAL THERAPY INITIAL EVALUATION ADULT - IMPAIRMENTS FOUND, PT EVAL
aerobic capacity/endurance/cognitive impairment/ergonomics and body mechanics/gait, locomotion, and balance/muscle strength

## 2020-07-30 NOTE — PHYSICAL THERAPY INITIAL EVALUATION ADULT - GENERAL OBSERVATIONS, REHAB EVAL
Pt is s/p L hip edith athroplasty on 7/6/20. Pt was seen in supine c primafit donned and L hip surgical site heeled well and Thoracic c excessive hyphotic curve. Pt was alert and oriented to name, place, time and situation but confused at times. Pt was able to follow 75% of simple command. Pt c/o no pain. Pt was educated hip precaution and pt needed constant verbal cues to follow through.

## 2020-07-30 NOTE — PROGRESS NOTE ADULT - PROBLEM SELECTOR PLAN 2
continue with Protonix mix   NPO status   GI consult appreciated.   Pending EGD when pt is clinically stable.
continue with Protonix   GI consult appreciated.   will monitor for now

## 2020-07-30 NOTE — PROGRESS NOTE ADULT - ASSESSMENT
79 years old female with ugib with synmptomatic anemia     IMPROVE VTE Individual Risk Assessment          RISK                                                          Points  [  ] Previous VTE                                                3  [  ] Thrombophilia                                             2  [  ] Lower limb paralysis                                   2        (unable to hold up >15 seconds)    [  ] Current Cancer                                             2         (within 6 months)  [  ] Immobilization > 24 hrs                              1  [  ] ICU/CCU stay > 24 hours                             1  [  ] Age > 60                                                         1    IMPROVE VTE Score: 2
79 years old female with ugib with synmptomatic anemia     IMPROVE VTE Individual Risk Assessment          RISK                                                          Points  [  ] Previous VTE                                                3  [  ] Thrombophilia                                             2  [  ] Lower limb paralysis                                   2        (unable to hold up >15 seconds)    [  ] Current Cancer                                             2         (within 6 months)  [  ] Immobilization > 24 hrs                              1  [  ] ICU/CCU stay > 24 hours                             1  [  ] Age > 60                                                         1    IMPROVE VTE Score: 2

## 2020-07-30 NOTE — PROGRESS NOTE ADULT - SUBJECTIVE AND OBJECTIVE BOX
Pt remains lethargic; confused  s/p transfusion 3u prbc's      MEDICATIONS  (STANDING):  ascorbic acid 500 milliGRAM(s) Oral daily  budesonide 160 MICROgram(s)/formoterol 4.5 MICROgram(s) Inhaler 2 Puff(s) Inhalation two times a day  cholecalciferol 400 Unit(s) Oral daily  dextrose 5%. 1000 milliLiter(s) (50 mL/Hr) IV Continuous <Continuous>  dextrose 50% Injectable 12.5 Gram(s) IV Push once  dextrose 50% Injectable 25 Gram(s) IV Push once  dextrose 50% Injectable 25 Gram(s) IV Push once  folic acid 1 milliGRAM(s) Oral daily  gabapentin 100 milliGRAM(s) Oral two times a day  hydrALAZINE 10 milliGRAM(s) Oral three times a day  hydroxychloroquine 200 milliGRAM(s) Oral two times a day  insulin lispro (HumaLOG) corrective regimen sliding scale   SubCutaneous every 6 hours  losartan 100 milliGRAM(s) Oral daily  pantoprazole  Injectable 40 milliGRAM(s) IV Push every 12 hours  petrolatum Ophthalmic Ointment 1 Application(s) Both EYES three times a day  polyethylene glycol 3350 17 Gram(s) Oral daily  sertraline 100 milliGRAM(s) Oral daily    MEDICATIONS  (PRN):  acetaminophen   Tablet .. 650 milliGRAM(s) Oral every 6 hours PRN Temp greater or equal to 38C (100.4F), Mild Pain (1 - 3)  artificial tears (preservative free) Ophthalmic Solution 1 Drop(s) Both EYES three times a day PRN Dry Eyes  dextrose 40% Gel 15 Gram(s) Oral once PRN Blood Glucose LESS THAN 70 milliGRAM(s)/deciliter  glucagon  Injectable 1 milliGRAM(s) IntraMuscular once PRN Glucose LESS THAN 70 milligrams/deciliter      Allergies    SULFA (Unknown)  sulfa drugs (Unknown)    Intolerances        Vital Signs Last 24 Hrs  T(C): 36.3 (30 Jul 2020 10:42), Max: 36.6 (29 Jul 2020 23:41)  T(F): 97.4 (30 Jul 2020 10:42), Max: 97.8 (29 Jul 2020 23:41)  HR: 66 (30 Jul 2020 10:42) (56 - 71)  BP: 148/86 (30 Jul 2020 10:42) (100/60 - 164/65)  BP(mean): --  RR: 18 (30 Jul 2020 10:42) (16 - 18)  SpO2: 95% (30 Jul 2020 10:42) (95% - 97%)    PHYSICAL EXAM:  General: NAD.  CVS: S1, S2  Chest: air entry bilaterally present  Abd: BS present, soft, non-tender      LABS:                        10.8   3.82  )-----------( 208      ( 30 Jul 2020 07:58 )             33.6     07-30    131<L>  |  101  |  11  ----------------------------<  70  3.8   |  23  |  0.33<L>    Ca    8.0<L>      30 Jul 2020 07:58        continue protonix  advance diet  await cardiac clearance for EGD/Colon although pt is in no condition for procedures at this time  CBC in AM Pt remains lethargic; confused  s/p transfusion 3u prbc's      MEDICATIONS  (STANDING):  ascorbic acid 500 milliGRAM(s) Oral daily  budesonide 160 MICROgram(s)/formoterol 4.5 MICROgram(s) Inhaler 2 Puff(s) Inhalation two times a day  cholecalciferol 400 Unit(s) Oral daily  dextrose 5%. 1000 milliLiter(s) (50 mL/Hr) IV Continuous <Continuous>  dextrose 50% Injectable 12.5 Gram(s) IV Push once  dextrose 50% Injectable 25 Gram(s) IV Push once  dextrose 50% Injectable 25 Gram(s) IV Push once  folic acid 1 milliGRAM(s) Oral daily  gabapentin 100 milliGRAM(s) Oral two times a day  hydrALAZINE 10 milliGRAM(s) Oral three times a day  hydroxychloroquine 200 milliGRAM(s) Oral two times a day  insulin lispro (HumaLOG) corrective regimen sliding scale   SubCutaneous every 6 hours  losartan 100 milliGRAM(s) Oral daily  pantoprazole  Injectable 40 milliGRAM(s) IV Push every 12 hours  petrolatum Ophthalmic Ointment 1 Application(s) Both EYES three times a day  polyethylene glycol 3350 17 Gram(s) Oral daily  sertraline 100 milliGRAM(s) Oral daily    MEDICATIONS  (PRN):  acetaminophen   Tablet .. 650 milliGRAM(s) Oral every 6 hours PRN Temp greater or equal to 38C (100.4F), Mild Pain (1 - 3)  artificial tears (preservative free) Ophthalmic Solution 1 Drop(s) Both EYES three times a day PRN Dry Eyes  dextrose 40% Gel 15 Gram(s) Oral once PRN Blood Glucose LESS THAN 70 milliGRAM(s)/deciliter  glucagon  Injectable 1 milliGRAM(s) IntraMuscular once PRN Glucose LESS THAN 70 milligrams/deciliter      Allergies    SULFA (Unknown)  sulfa drugs (Unknown)    Intolerances        Vital Signs Last 24 Hrs  T(C): 36.3 (30 Jul 2020 10:42), Max: 36.6 (29 Jul 2020 23:41)  T(F): 97.4 (30 Jul 2020 10:42), Max: 97.8 (29 Jul 2020 23:41)  HR: 66 (30 Jul 2020 10:42) (56 - 71)  BP: 148/86 (30 Jul 2020 10:42) (100/60 - 164/65)  BP(mean): --  RR: 18 (30 Jul 2020 10:42) (16 - 18)  SpO2: 95% (30 Jul 2020 10:42) (95% - 97%)    PHYSICAL EXAM:  General: NAD.  CVS: S1, S2  Chest: air entry bilaterally present  Abd: BS present, soft, non-tender      LABS:                        10.8   3.82  )-----------( 208      ( 30 Jul 2020 07:58 )             33.6     07-30    131<L>  |  101  |  11  ----------------------------<  70  3.8   |  23  |  0.33<L>    Ca    8.0<L>      30 Jul 2020 07:58        continue protonix  advance diet  pt is in no condition for procedures at this time  CBC in AM

## 2020-07-30 NOTE — PROGRESS NOTE ADULT - SUBJECTIVE AND OBJECTIVE BOX
Patient is a 79y old  Female who presents with a chief complaint of GI bleed (30 Jul 2020 12:02)      INTERVAL HPI/ OVERNIGHT EVENTS: Pt was seen and examined at bedside today, No significant overnight events, pt denies any complaints, no bleeding overnight as per nursing staff      MEDICATIONS  (STANDING):  ascorbic acid 500 milliGRAM(s) Oral daily  budesonide 160 MICROgram(s)/formoterol 4.5 MICROgram(s) Inhaler 2 Puff(s) Inhalation two times a day  cholecalciferol 400 Unit(s) Oral daily  dextrose 5%. 1000 milliLiter(s) (50 mL/Hr) IV Continuous <Continuous>  dextrose 50% Injectable 12.5 Gram(s) IV Push once  dextrose 50% Injectable 25 Gram(s) IV Push once  dextrose 50% Injectable 25 Gram(s) IV Push once  folic acid 1 milliGRAM(s) Oral daily  gabapentin 100 milliGRAM(s) Oral two times a day  hydrALAZINE 10 milliGRAM(s) Oral three times a day  hydroxychloroquine 200 milliGRAM(s) Oral two times a day  insulin lispro (HumaLOG) corrective regimen sliding scale   SubCutaneous every 6 hours  losartan 100 milliGRAM(s) Oral daily  pantoprazole  Injectable 40 milliGRAM(s) IV Push every 12 hours  petrolatum Ophthalmic Ointment 1 Application(s) Both EYES three times a day  polyethylene glycol 3350 17 Gram(s) Oral daily  sertraline 100 milliGRAM(s) Oral daily    MEDICATIONS  (PRN):  acetaminophen   Tablet .. 650 milliGRAM(s) Oral every 6 hours PRN Temp greater or equal to 38C (100.4F), Mild Pain (1 - 3)  artificial tears (preservative free) Ophthalmic Solution 1 Drop(s) Both EYES three times a day PRN Dry Eyes  dextrose 40% Gel 15 Gram(s) Oral once PRN Blood Glucose LESS THAN 70 milliGRAM(s)/deciliter  glucagon  Injectable 1 milliGRAM(s) IntraMuscular once PRN Glucose LESS THAN 70 milligrams/deciliter      Allergies    SULFA (Unknown)  sulfa drugs (Unknown)    Intolerances        REVIEW OF SYSTEMS:    Unable to examine due to [ ] Encephalopathy [ x] Advanced Dementia [ ] Expressive Aphasia [ ] Non-verbal patient        Vital Signs Last 24 Hrs  T(C): 36.2 (30 Jul 2020 16:00), Max: 36.6 (29 Jul 2020 23:41)  T(F): 97.2 (30 Jul 2020 16:00), Max: 97.8 (29 Jul 2020 23:41)  HR: 68 (30 Jul 2020 16:00) (53 - 71)  BP: 152/72 (30 Jul 2020 16:00) (100/60 - 174/65)  BP(mean): --  RR: 18 (30 Jul 2020 16:00) (16 - 18)  SpO2: 97% (30 Jul 2020 16:00) (95% - 97%)    PHYSICAL EXAM:  GENERAL: NAD, well-developed, well-groomed  HEAD:  Atraumatic, Normocephalic  EYES: conjunctiva and sclera clear  ENMT: Moist mucous membranes  NECK: Supple, No JVD, Normal thyroid  CHEST/LUNG: Clear to Auscultation bilaterally; No rales, rhonchi, wheezing, or rubs  HEART: Regular rate and rhythm; No murmurs, rubs, or gallops  ABDOMEN: Soft, Nontender, Nondistended; Bowel sounds present  EXTREMITIES:  2+ Peripheral Pulses, No clubbing, cyanosis, or edema  SKIN: No rashes or lesions  NERVOUS SYSTEM:  confused    LABS:                        10.8   3.82  )-----------( 208      ( 30 Jul 2020 07:58 )             33.6     07-30    131<L>  |  101  |  11  ----------------------------<  70  3.8   |  23  |  0.33<L>    Ca    8.0<L>      30 Jul 2020 07:58          CAPILLARY BLOOD GLUCOSE      POCT Blood Glucose.: 82 mg/dL (30 Jul 2020 11:14)  POCT Blood Glucose.: 79 mg/dL (30 Jul 2020 07:53)  POCT Blood Glucose.: 76 mg/dL (30 Jul 2020 05:13)  POCT Blood Glucose.: 123 mg/dL (30 Jul 2020 01:14)  POCT Blood Glucose.: 66 mg/dL (29 Jul 2020 23:58)          RADIOLOGY & ADDITIONAL TESTS:          Imaging Personally Reviewed:  [ ] YES  [ ] NO    Consultant(s) Notes Reviewed:  [x ] YES  [ ] NO    Care Discussed with Consultants/Other Providers [x ] YES  [ ] NO

## 2020-07-30 NOTE — PROGRESS NOTE ADULT - PROBLEM SELECTOR PLAN 7
DVTp: SCDs, AC contraindicated   Disposition: [facility]
DVTp: SCDs, AC contraindicated   Disposition: TBD

## 2020-07-30 NOTE — PHYSICAL THERAPY INITIAL EVALUATION ADULT - BED MOBILITY TRAINING, PT EVAL
Pt will increase static/dynamic sitting balance to good and static/dynamic standing balance to good to perform all functional mobility without LOB, by 4weeks.

## 2020-07-30 NOTE — PHYSICAL THERAPY INITIAL EVALUATION ADULT - GAIT DEVIATIONS NOTED, PT EVAL
increased time in double stance/decreased tommy/decreased weight-shifting ability/decreased step length/decreased velocity of limb motion

## 2020-07-30 NOTE — PHYSICAL THERAPY INITIAL EVALUATION ADULT - BALANCE TRAINING, PT EVAL
Pt will increase static/dynamic sitting balance to good and static/dynamic standing balance to good to perform all functional mobility c min assist x1, without LOB, by 4weeks.

## 2020-07-30 NOTE — PHYSICAL THERAPY INITIAL EVALUATION ADULT - ADDITIONAL COMMENTS
Prior to surgery, pt lives in a private house with 2 steps to enter and 1 handrail, once inside there are no further steps to negotiate. Pt has a rolling walker, shower chair (broken), bathroom has rails, regular hospital bed. Pt had home PT, was ambulating short distances with increased pain since February. The fall in February was unwitnessed. Information from EMR.

## 2020-07-31 ENCOUNTER — TRANSCRIPTION ENCOUNTER (OUTPATIENT)
Age: 80
End: 2020-07-31

## 2020-07-31 VITALS
TEMPERATURE: 99 F | RESPIRATION RATE: 18 BRPM | OXYGEN SATURATION: 96 % | DIASTOLIC BLOOD PRESSURE: 71 MMHG | SYSTOLIC BLOOD PRESSURE: 116 MMHG | HEART RATE: 74 BPM

## 2020-07-31 LAB
GLUCOSE BLDC GLUCOMTR-MCNC: 112 MG/DL — HIGH (ref 70–99)
GLUCOSE BLDC GLUCOMTR-MCNC: 217 MG/DL — HIGH (ref 70–99)
GLUCOSE BLDC GLUCOMTR-MCNC: 87 MG/DL — SIGNIFICANT CHANGE UP (ref 70–99)
GLUCOSE BLDC GLUCOMTR-MCNC: 89 MG/DL — SIGNIFICANT CHANGE UP (ref 70–99)
GLUCOSE BLDC GLUCOMTR-MCNC: 96 MG/DL — SIGNIFICANT CHANGE UP (ref 70–99)
HCT VFR BLD CALC: 33.7 % — LOW (ref 34.5–45)
HGB BLD-MCNC: 10.8 G/DL — LOW (ref 11.5–15.5)
MCHC RBC-ENTMCNC: 26.9 PG — LOW (ref 27–34)
MCHC RBC-ENTMCNC: 32 GM/DL — SIGNIFICANT CHANGE UP (ref 32–36)
MCV RBC AUTO: 83.8 FL — SIGNIFICANT CHANGE UP (ref 80–100)
NRBC # BLD: 0 /100 WBCS — SIGNIFICANT CHANGE UP (ref 0–0)
PLATELET # BLD AUTO: 206 K/UL — SIGNIFICANT CHANGE UP (ref 150–400)
RBC # BLD: 4.02 M/UL — SIGNIFICANT CHANGE UP (ref 3.8–5.2)
RBC # FLD: 17.1 % — HIGH (ref 10.3–14.5)
WBC # BLD: 5.6 K/UL — SIGNIFICANT CHANGE UP (ref 3.8–10.5)
WBC # FLD AUTO: 5.6 K/UL — SIGNIFICANT CHANGE UP (ref 3.8–10.5)

## 2020-07-31 PROCEDURE — 99239 HOSP IP/OBS DSCHRG MGMT >30: CPT

## 2020-07-31 RX ORDER — ASCORBIC ACID 60 MG
0 TABLET,CHEWABLE ORAL
Qty: 0 | Refills: 0 | DISCHARGE

## 2020-07-31 RX ORDER — HYDRALAZINE HCL 50 MG
1 TABLET ORAL
Qty: 0 | Refills: 0 | DISCHARGE
Start: 2020-07-31

## 2020-07-31 RX ORDER — HYDRALAZINE HCL 50 MG
25 TABLET ORAL THREE TIMES A DAY
Refills: 0 | Status: DISCONTINUED | OUTPATIENT
Start: 2020-07-31 | End: 2020-07-31

## 2020-07-31 RX ORDER — ATENOLOL 25 MG/1
0 TABLET ORAL
Qty: 90 | Refills: 0 | DISCHARGE

## 2020-07-31 RX ORDER — DEXTROSE 50 % IN WATER 50 %
25 SYRINGE (ML) INTRAVENOUS ONCE
Refills: 0 | Status: DISCONTINUED | OUTPATIENT
Start: 2020-07-31 | End: 2020-07-31

## 2020-07-31 RX ADMIN — SERTRALINE 100 MILLIGRAM(S): 25 TABLET, FILM COATED ORAL at 12:37

## 2020-07-31 RX ADMIN — Medication 1 MILLIGRAM(S): at 12:37

## 2020-07-31 RX ADMIN — Medication 400 UNIT(S): at 12:37

## 2020-07-31 RX ADMIN — GABAPENTIN 100 MILLIGRAM(S): 400 CAPSULE ORAL at 05:01

## 2020-07-31 RX ADMIN — Medication 200 MILLIGRAM(S): at 17:42

## 2020-07-31 RX ADMIN — Medication 1 APPLICATION(S): at 05:01

## 2020-07-31 RX ADMIN — BUDESONIDE AND FORMOTEROL FUMARATE DIHYDRATE 2 PUFF(S): 160; 4.5 AEROSOL RESPIRATORY (INHALATION) at 05:10

## 2020-07-31 RX ADMIN — LOSARTAN POTASSIUM 100 MILLIGRAM(S): 100 TABLET, FILM COATED ORAL at 05:01

## 2020-07-31 RX ADMIN — Medication 1 APPLICATION(S): at 13:23

## 2020-07-31 RX ADMIN — PANTOPRAZOLE SODIUM 40 MILLIGRAM(S): 20 TABLET, DELAYED RELEASE ORAL at 12:37

## 2020-07-31 RX ADMIN — Medication 10 MILLIGRAM(S): at 05:01

## 2020-07-31 RX ADMIN — Medication 25 MILLIGRAM(S): at 13:23

## 2020-07-31 RX ADMIN — BUDESONIDE AND FORMOTEROL FUMARATE DIHYDRATE 2 PUFF(S): 160; 4.5 AEROSOL RESPIRATORY (INHALATION) at 17:43

## 2020-07-31 RX ADMIN — Medication 500 MILLIGRAM(S): at 12:37

## 2020-07-31 RX ADMIN — POLYETHYLENE GLYCOL 3350 17 GRAM(S): 17 POWDER, FOR SOLUTION ORAL at 12:38

## 2020-07-31 RX ADMIN — Medication 200 MILLIGRAM(S): at 05:01

## 2020-07-31 RX ADMIN — GABAPENTIN 100 MILLIGRAM(S): 400 CAPSULE ORAL at 17:42

## 2020-07-31 NOTE — DISCHARGE NOTE PROVIDER - HOSPITAL COURSE
78yo F w/ PMH dementia, copd, dm, htn, gerd, and lupus s/p Left hip fracture and s/p Hemiarthroplasty of left hip 7/07/20 and discharged to [facility] with Xarelto for DVT prophylaxis was sent back to ER from [facility] for melena and a hgb of 5.8.         The patient was admitted to Telemetry bed. GI was consulted and followed the patient. Xarelto was held and the patient was transfused 3U pRBC. The patient's hemoglobin increased appropriately. During the hospital course the patient failed to have any further bleeding. Hemoglobin remained stable and pt is tolerating oral diet.

## 2020-07-31 NOTE — CHART NOTE - NSCHARTNOTEFT_GEN_A_CORE
Upon Nutritional Assessment by the Registered Dietitian your patient was determined to meet criteria / has evidence of the following diagnosis/diagnoses:          [ ]  Mild Protein Calorie Malnutrition        [ ]  Moderate Protein Calorie Malnutrition        [ x] Severe Protein Calorie Malnutrition        [ ] Unspecified Protein Calorie Malnutrition        [x ] Underweight / BMI <19        [ ] Morbid Obesity / BMI > 40      Findings as based on:  •  Comprehensive nutrition assessment and consultation  •  Calorie counts (nutrient intake analysis)  •  Food acceptance and intake status from observations by staff  •  Follow up  •  Patient education  •  Intervention secondary to interdisciplinary rounds  •   concerns      Treatment:    The following diet has been recommended:  advance diet when appropriate to Dysphagia 1 Pureed- Thin Liquids , Low sodium , consistent carbohydrate c evening snack + add Glucerna Shake 3 x daily =660 calories, 30 grams protein   add multivitamin c minerals daily      PROVIDER Section:     By signing this assessment you are acknowledging and agree with the diagnosis/diagnoses assigned by the Registered Dietitian    Comments:

## 2020-07-31 NOTE — DIETITIAN INITIAL EVALUATION ADULT. - PHYSICAL APPEARANCE
underweight/emaciated/other (specify)/BMI=14.7(07/29/20), no edema noted. Pt c visible severe depletion of orbital, triceps, clavicle and shoulder, interosseous areas & moderate depletion of temple area.

## 2020-07-31 NOTE — DISCHARGE NOTE PROVIDER - NSDCMRMEDTOKEN_GEN_ALL_CORE_FT
acetaminophen 325 mg oral tablet: 2 tab(s) orally every 6 hours  Advair Diskus 100 mcg-50 mcg inhalation powder: 1 puff(s) inhaled 2 times a day  ascorbic acid 500 mg oral tablet: 1 tab(s) orally 2 times a day  cholecalciferol oral tablet: 400 unit(s) orally once a day  folic acid 1 mg oral tablet: 1 tab(s) orally once a day  gabapentin 100 mg oral capsule: 1 cap(s) orally 2 times a day  hydrALAZINE 25 mg oral tablet: 1 tab(s) orally 3 times a day  hydroxychloroquine 200 mg oral tablet: orally 2 times a day  losartan 100 mg oral tablet: 1 tab(s) orally once a day  ocular lubricant ophthalmic solution: 1 drop(s) to each affected eye 3 times a day, As needed, Dry Eyes  omeprazole 20 mg oral delayed release capsule: 1 cap(s) orally once a day  polyethylene glycol 3350 oral powder for reconstitution: 17 gram(s) orally once a day As Needed - for constipation  sertraline 100 mg oral tablet: 1 tab(s) orally once a day

## 2020-07-31 NOTE — DIETITIAN INITIAL EVALUATION ADULT. - FACTORS AFF FOOD INTAKE
missing upper teeth & some bottom teeth noted/difficulty chewing/difficulty feeding self/change in mental status

## 2020-07-31 NOTE — DISCHARGE NOTE PROVIDER - CARE PROVIDER_API CALL
Norm Woodward  INTERNAL MEDICINE  [address]  [place] [address]  Phone: [phone]  Fax: [phone]  Follow Up Time:     Pramod Sepulveda  ORTHOPAEDIC SURGERY  [address]  [place] [address]  Phone: [phone]  Fax: [phone]  Follow Up Time:

## 2020-07-31 NOTE — DISCHARGE NOTE NURSING/CASE MANAGEMENT/SOCIAL WORK - PATIENT PORTAL LINK FT
You can access the FollowMyHealth Patient Portal offered by [facility] by registering at the following website: [email or web address]. By joining [facility]’s FollowMyHealth portal, you will also be able to view your health information using other applications (apps) compatible with our system.

## 2020-07-31 NOTE — DIETITIAN INITIAL EVALUATION ADULT. - CONTINUE CURRENT NUTRITION CARE PLAN
advance diet when appropriate to Dysphagia 1 Pureed- Thin Liquids , Low sodium , consistent carbohydrate c evening snack , Glucerna Shake 3 x daily =660 calories, 30 grams protein

## 2020-07-31 NOTE — DIETITIAN INITIAL EVALUATION ADULT. - OTHER INFO
Pt is confused, was in [facility], was on Dysphagia 1 Pureed- Thin Liquids , consistent carbohydrate, DASH diet c Glucerna Shake 3 x daily =660 calories, 30 grams protein.   Pt is known to RD from recent adm, pt was 35.5 kg on 07/07/20, wt. gain 3.2 kg noted as per current adm.  GI notes regarding considering colonoscopy when condition improves noted.  Pt was not on diabetic meds PTA. Pt is confused, was in [facility], was on Dysphagia 1 Pureed- Thin Liquids , consistent carbohydrate, DASH diet c Glucerna Shake 3 x daily =660 calories, 30 grams protein.   Pt is known to RD from recent adm, pt was 35.5 kg on 07/07/20, wt. gain 3.2 kg noted as per current adm.  GI notes regarding considering colonoscopy when condition improves noted.  Pt was not on diabetic meds PTA.  Pt is DNR/DNI, no choice regarding artificially administered fluids/nutrition made on the MOLST form.

## 2020-07-31 NOTE — DIETITIAN INITIAL EVALUATION ADULT. - PERTINENT LABORATORY DATA
07-30 Na131 mmol/L<L> Glu 70 mg/dL K+ 3.8 mmol/L Cr  0.33 mg/dL<L> BUN 11 mg/dL 07-28 Alb 2.5 g/dL<L>07-28 ALT 28 U/L AST 27 U/L Alkaline Phosphatase 119 U/L  07/09/20 A1C=5.6% <good glycemic control>

## 2020-07-31 NOTE — DIETITIAN INITIAL EVALUATION ADULT. - PERTINENT MEDS FT
MEDICATIONS  (STANDING):  ascorbic acid 500 milliGRAM(s) Oral daily  budesonide 160 MICROgram(s)/formoterol 4.5 MICROgram(s) Inhaler 2 Puff(s) Inhalation two times a day  cholecalciferol 400 Unit(s) Oral daily  dextrose 5%. 1000 milliLiter(s) (50 mL/Hr) IV Continuous <Continuous>  dextrose 50% Injectable 12.5 Gram(s) IV Push once  dextrose 50% Injectable 25 Gram(s) IV Push once  dextrose 50% Injectable 25 Gram(s) IV Push once  folic acid 1 milliGRAM(s) Oral daily  gabapentin 100 milliGRAM(s) Oral two times a day  hydrALAZINE 25 milliGRAM(s) Oral three times a day  hydroxychloroquine 200 milliGRAM(s) Oral two times a day  insulin lispro (HumaLOG) corrective regimen sliding scale   SubCutaneous every 6 hours  losartan 100 milliGRAM(s) Oral daily  pantoprazole  Injectable 40 milliGRAM(s) IV Push every 12 hours  petrolatum Ophthalmic Ointment 1 Application(s) Both EYES three times a day  polyethylene glycol 3350 17 Gram(s) Oral daily  sertraline 100 milliGRAM(s) Oral daily    MEDICATIONS  (PRN):  acetaminophen   Tablet .. 650 milliGRAM(s) Oral every 6 hours PRN Temp greater or equal to 38C (100.4F), Mild Pain (1 - 3)  artificial tears (preservative free) Ophthalmic Solution 1 Drop(s) Both EYES three times a day PRN Dry Eyes  dextrose 40% Gel 15 Gram(s) Oral once PRN Blood Glucose LESS THAN 70 milliGRAM(s)/deciliter  glucagon  Injectable 1 milliGRAM(s) IntraMuscular once PRN Glucose LESS THAN 70 milligrams/deciliter

## 2020-07-31 NOTE — DISCHARGE NOTE PROVIDER - NSDCCPCAREPLAN_GEN_ALL_CORE_FT
PRINCIPAL DISCHARGE DIAGNOSIS  Diagnosis: Anemia, unspecified type  Assessment and Plan of Treatment: Acute blood loss anemia secondary to GIB with Xarelto  s/p 3U pRBC blood transfusion   Hemoglobin is now stable.      SECONDARY DISCHARGE DIAGNOSES  Diagnosis: UGIB (upper gastrointestinal bleed)  Assessment and Plan of Treatment: Resolved   Discontinue Xarelto   Continue with Omeprazole  Follow up with GI Dr. Woodward    Diagnosis: Lupus  Assessment and Plan of Treatment: continue with Plaquenil    Diagnosis: Late onset Alzheimer's disease without behavioral disturbance  Assessment and Plan of Treatment: continue with supportive care. PRINCIPAL DISCHARGE DIAGNOSIS  Diagnosis: Anemia, unspecified type  Assessment and Plan of Treatment: Acute blood loss anemia secondary to GIB with Xarelto  s/p 3U pRBC blood transfusion   Hemoglobin is now stable.      SECONDARY DISCHARGE DIAGNOSES  Diagnosis: UGIB (upper gastrointestinal bleed)  Assessment and Plan of Treatment: Resolved   Discontinue Xarelto   Continue with Omeprazole  Follow up with GI Dr. Woodward    Diagnosis: Hypertension  Assessment and Plan of Treatment: Bradycardia: Secondary to Atenolol; resolved with discontinuation   continue with Losartan, and Hydralazine    Diagnosis: Lupus  Assessment and Plan of Treatment: continue with Plaquenil    Diagnosis: Late onset Alzheimer's disease without behavioral disturbance  Assessment and Plan of Treatment: continue with supportive care.

## 2020-07-31 NOTE — DIETITIAN INITIAL EVALUATION ADULT. - ENERGY NEEDS
Height (cm): 162.5 (07-28)  Weight (kg): 38.7 (07-29)  BMI (kg/m2): 14.7 (07-29)  IBW:  54.4 kg        % IBW:  71%           UBW:  ?           %UBW: ?

## 2020-07-31 NOTE — PROGRESS NOTE ADULT - SUBJECTIVE AND OBJECTIVE BOX
Pt stable - lethargic  No bleeding; H/H stable      MEDICATIONS  (STANDING):  ascorbic acid 500 milliGRAM(s) Oral daily  budesonide 160 MICROgram(s)/formoterol 4.5 MICROgram(s) Inhaler 2 Puff(s) Inhalation two times a day  cholecalciferol 400 Unit(s) Oral daily  dextrose 5%. 1000 milliLiter(s) (50 mL/Hr) IV Continuous <Continuous>  dextrose 50% Injectable 12.5 Gram(s) IV Push once  dextrose 50% Injectable 25 Gram(s) IV Push once  dextrose 50% Injectable 25 Gram(s) IV Push once  folic acid 1 milliGRAM(s) Oral daily  gabapentin 100 milliGRAM(s) Oral two times a day  hydrALAZINE 10 milliGRAM(s) Oral three times a day  hydroxychloroquine 200 milliGRAM(s) Oral two times a day  insulin lispro (HumaLOG) corrective regimen sliding scale   SubCutaneous every 6 hours  losartan 100 milliGRAM(s) Oral daily  pantoprazole  Injectable 40 milliGRAM(s) IV Push every 12 hours  petrolatum Ophthalmic Ointment 1 Application(s) Both EYES three times a day  polyethylene glycol 3350 17 Gram(s) Oral daily  sertraline 100 milliGRAM(s) Oral daily    MEDICATIONS  (PRN):  acetaminophen   Tablet .. 650 milliGRAM(s) Oral every 6 hours PRN Temp greater or equal to 38C (100.4F), Mild Pain (1 - 3)  artificial tears (preservative free) Ophthalmic Solution 1 Drop(s) Both EYES three times a day PRN Dry Eyes  dextrose 40% Gel 15 Gram(s) Oral once PRN Blood Glucose LESS THAN 70 milliGRAM(s)/deciliter  glucagon  Injectable 1 milliGRAM(s) IntraMuscular once PRN Glucose LESS THAN 70 milligrams/deciliter      Allergies    SULFA (Unknown)  sulfa drugs (Unknown)    Intolerances        Vital Signs Last 24 Hrs  T(C): 36.6 (31 Jul 2020 04:42), Max: 36.9 (30 Jul 2020 23:33)  T(F): 97.9 (31 Jul 2020 04:42), Max: 98.4 (30 Jul 2020 23:33)  HR: 67 (31 Jul 2020 04:42) (53 - 69)  BP: 166/71 (31 Jul 2020 04:42) (121/57 - 174/65)  BP(mean): --  RR: 16 (31 Jul 2020 04:42) (16 - 18)  SpO2: 96% (31 Jul 2020 04:42) (95% - 97%)    PHYSICAL EXAM:  General: NAD.  CVS: S1, S2  Chest: air entry bilaterally present  Abd: BS present, soft, non-tender      LABS:                        10.8   5.60  )-----------( 206      ( 31 Jul 2020 07:45 )             33.7     07-30    131<L>  |  101  |  11  ----------------------------<  70  3.8   |  23  |  0.33<L>    Ca    8.0<L>      30 Jul 2020 07:58          diet as tolerated  will consider colonoscopy when condition improves - right now risks outweigh bendfits

## 2020-08-05 DIAGNOSIS — F03.90 UNSPECIFIED DEMENTIA WITHOUT BEHAVIORAL DISTURBANCE: ICD-10-CM

## 2020-08-05 DIAGNOSIS — Z88.2 ALLERGY STATUS TO SULFONAMIDES: ICD-10-CM

## 2020-08-05 DIAGNOSIS — G30.1 ALZHEIMER'S DISEASE WITH LATE ONSET: ICD-10-CM

## 2020-08-05 DIAGNOSIS — K21.9 GASTRO-ESOPHAGEAL REFLUX DISEASE WITHOUT ESOPHAGITIS: ICD-10-CM

## 2020-08-05 DIAGNOSIS — I11.0 HYPERTENSIVE HEART DISEASE WITH HEART FAILURE: ICD-10-CM

## 2020-08-05 DIAGNOSIS — J44.9 CHRONIC OBSTRUCTIVE PULMONARY DISEASE, UNSPECIFIED: ICD-10-CM

## 2020-08-05 DIAGNOSIS — D62 ACUTE POSTHEMORRHAGIC ANEMIA: ICD-10-CM

## 2020-08-05 DIAGNOSIS — F02.80 DEMENTIA IN OTHER DISEASES CLASSIFIED ELSEWHERE, UNSPECIFIED SEVERITY, WITHOUT BEHAVIORAL DISTURBANCE, PSYCHOTIC DISTURBANCE, MOOD DISTURBANCE, AND ANXIETY: ICD-10-CM

## 2020-08-05 DIAGNOSIS — D64.9 ANEMIA, UNSPECIFIED: ICD-10-CM

## 2020-08-05 DIAGNOSIS — Z11.59 ENCOUNTER FOR SCREENING FOR OTHER VIRAL DISEASES: ICD-10-CM

## 2020-08-05 DIAGNOSIS — I50.30 UNSPECIFIED DIASTOLIC (CONGESTIVE) HEART FAILURE: ICD-10-CM

## 2020-08-05 DIAGNOSIS — K92.2 GASTROINTESTINAL HEMORRHAGE, UNSPECIFIED: ICD-10-CM

## 2020-08-05 DIAGNOSIS — I44.0 ATRIOVENTRICULAR BLOCK, FIRST DEGREE: ICD-10-CM

## 2020-08-05 DIAGNOSIS — E11.9 TYPE 2 DIABETES MELLITUS WITHOUT COMPLICATIONS: ICD-10-CM

## 2020-08-05 DIAGNOSIS — Z66 DO NOT RESUSCITATE: ICD-10-CM

## 2020-08-05 DIAGNOSIS — M32.9 SYSTEMIC LUPUS ERYTHEMATOSUS, UNSPECIFIED: ICD-10-CM

## 2020-08-12 NOTE — ED ADULT NURSE NOTE - NS ED NURSE TRANSPORT WITH
Patient/Caregiver provided printed discharge information.
Cardiac Monitor/Defib/ACLS/Rescue Kit/O2/BVM

## 2021-04-08 NOTE — CONSULT NOTE ADULT - CONSULT REQUESTED DATE/TIME
06-Jul-2020 20:03 [de-identified] : 62  year old female with HTN, HLD, COPD, fibromyalgia, obesity, H. Pylori 11/2020 s/p Levaquin based therapy colonic polyps (tubular adenoma), recent EGD 03/17/2021 with +Hpylori presenting for follow-up. \par She was prescribed Non- bismuth based quadruple therapy prescribed \par She feels overall better but she hasn't been taking the regimen on time due to side effect as per her.\par \par Patient underwent EGD/colonoscopy on 11/3/2020. She had 2 TAs and EGD bx positive for Hpylori. She was prescribed levaquin based therapy due to interaction with methotrexate and bismuth. \par \par \par 03/17/2021\par Findings:\par      A non-obstructing Schatzki ring was found in the lower third of the \par      esophagus.\par      A small hiatal hernia was present.\par      The exam of the esophagus was otherwise normal.\par      A single 4 mm mucosal papule (nodule) with no bleeding and no stigmata \par      of recent bleeding was found in the prepyloric region of the stomach. \par      Biopsies were taken with a cold forceps for histology.\par      Patchy mildly erythematous mucosa without bleeding was found in the \par      gastric antrum. Biopsies were taken with a cold forceps for Helicobacter \par      pylori testing.\par      The exam of the stomach was otherwise normal.\par      Few non-bleeding superficial duodenal ulcers with a clean ulcer base \par      (Sami Class III) were found in the duodenal bulb. The largest lesion \par      was 3 mm in largest dimension.\par      The exam of the duodenum was otherwise normal.\par                                                                                \par Impression:          - Non-obstructing Schatzki ring.\par                      - Small hiatal hernia.\par                      - A single mucosal papule (nodule) found in the stomach. \par                      Biopsied.\par                      - Erythematous mucosa in the antrum. Biopsied.\par                      - Multiple non-bleeding duodenal ulcers with a clean \par                      ulcer base (Sami Class III).\par \par \par EGD\par Findings:\par      A non-obstructing Schatzki ring was found at the gastroesophageal \par      junction.\par      A 2 cm hiatal hernia was present.\par      The gastroesophageal flap valve was visualized endoscopically and \par      classified as Hill Grade III (minimal fold, loose to endoscope, hiatal \par      hernia likely).\par      Scattered moderate mucosal changes characterized by nodularity were \par      found in the entire esophagus. Biopsies were taken of the proximal and \par      distal esophagus with a cold forceps for histology and rule out EoE.\par      Scattered mild mucosal changes characterized by erythema and granularity \par      were found in the gastric body and in the gastric antrum. Biopsies were \par      taken with a cold forceps for Helicobacter pylori testing.\par      Diffuse mild mucosal changes characterized by congestion and erythema \par      were found in the duodenal bulb.\par      The second portion of the duodenum was normal.\par                                                                                \par Impression:          - Non-obstructing Schatzki ring.\par                      - 2 cm hiatal hernia.\par                      - Gastroesophageal flap valve classified as Hill Grade \par                      III (minimal fold, loose to endoscope, hiatal hernia \par                      likely).\par                      - Nodular mucosa in the esophagus. Biopsied.\par                      - Erythematous and granular mucosa in the gastric body \par                      and antrum. Biopsied.\par                      - Mucosal changes in the duodenum.\par                      - Normal second portion of the duodenum.\par \par \par Colonoscopy\par Findings:\par      Skin tags were found on perianal exam.\par      A 3 mm polyp was found in the cecum. The polyp was sessile. The polyp \par      was removed with a cold snare. Resection and retrieval were complete.\par      A 5 mm polyp was found in the sigmoid colon. The polyp was sessile. The \par      polyp was removed with a cold snare. Resection and retrieval were \par      complete.\par      A 2 mm polyp was found in the sigmoid colon. The polyp was flat. \par      Biopsies were taken with a cold forceps for histology.\par      A 3 mm polyp was found in the sigmoid colon. The polyp was sessile. The \par      polyp was removed with a jumbo cold forceps. Resection and retrieval \par      were complete.\par      A few sessile polyps were found in the recto-sigmoid colon. The polyps \par      were less than 5 mm in size and appeared hyperplastic in nature.\par      A few small-mouthed diverticula were found in the sigmoid colon, \par      descending colon and ascending colon.\par      Internal hemorrhoids were found during retroflexion. The hemorrhoids \par      were large.\par      The ileocecal valve was moderately lipomatous. Biopsies were taken with \par      a cold forceps for histology.\par                                                                                \par Impression:          - Perianal skin tags found on perianal exam.\par                      - One 3 mm polyp in the cecum, removed with a cold \par                      snare. Resected and retrieved.\par                      - One 5 mm polyp in the sigmoid colon, removed with a \par                      cold snare. Resected and retrieved.\par                      - One 2 mm polyp in the sigmoid colon. Biopsied.\par                      - One 3 mm polyp in the sigmoid colon, removed with a \par                      jumbo cold forceps. Resected and retrieved.\par                      - A few less than 5 mm polyps at the recto-sigmoid \par                      colon, hyperplastic appearing.\par                      - Diverticulosis in the sigmoid colon and in the \par                      descending colon.\par                      - Internal hemorrhoids.\par                      - Lipomatous ileocecal valve. Biopsied.

## 2022-07-11 NOTE — PRE-OP CHECKLIST - NOTHING BY MOUTH SINCE
Phone report was given to Mitzy Grigsby RN  
04-Oct-2019 00:00
30-Sep-2019 00:59
03-Oct-2019 11:59
30-Sep-2019 11:59

## 2023-03-19 NOTE — PHYSICAL THERAPY INITIAL EVALUATION ADULT - ORIENTATION, REHAB EVAL
Fluid needs deferred to team. Energy, protein needs based on IBW: 166 lbs/75.2 kg with consideration for wt discrepancies, BMI >25
person

## 2023-04-25 NOTE — PATIENT PROFILE ADULT - DISASTER - NSASFALLATTEMPTOOB_GEN_A_NUR
Has anemia but no decide acute bleeding  Will request stool for hemoccult  Consider IR consult to evaluate for IVC filter placement and or mechanical thrombectomy if bleeding risk or active bleeding occurs   no

## 2024-05-01 NOTE — ED ADULT TRIAGE NOTE - TEMPERATURE IN FAHRENHEIT (DEGREES F)
Infusion Nursing Note:  Ronna Collins presents today for Prolia SubQ injection.    Patient seen by provider today: No   present during visit today: Not Applicable.    Note: Patient arrives in infusion feeling well overall. No new complaints or concerns. Patient denies having pain.       Intravenous Access:  No Intravenous access/labs at this visit.    Treatment Conditions:  Results reviewed, labs MET treatment parameters, ok to proceed with treatment.     Latest Reference Range & Units 05/01/24 08:36   Calcium 8.8 - 10.2 mg/dL 9.8       Post Infusion Assessment:  Patient tolerated Prolia injection SubQ in left arm without incident.       Discharge Plan:   Patient declined prescription refills.  Discharge instructions reviewed with: Patient.  Patient and/or family verbalized understanding of discharge instructions and all questions answered.  AVS to patient via [employer].  Patient will return 7/9/2024 for next appointment with provider.   Patient discharged in stable condition accompanied by: self.  Departure Mode: Ambulatory.      Jt Cheng RN   98

## 2024-05-06 NOTE — PROGRESS NOTE ADULT - SUBJECTIVE AND OBJECTIVE BOX
REVIEW OF CARDIOVASCULAR SYSTEMS:  Cardiovascular problem(s): Shortness of Breath on Exertion and Swelling in Legs, Ankles, Abdomen    Patient does not smoke.    Patient does not take aspirin.  Reason patient does not take aspirin: not advised patient on warfarin    INTERVAL HPI/OVERNIGHT EVENTS:    having bowel movements   abdominal pain overnight  since resolved  no n/v    MEDICATIONS  (STANDING):  acetaminophen   Tablet .. 650 milliGRAM(s) Oral every 6 hours  amLODIPine   Tablet 5 milliGRAM(s) Oral daily  buDESOnide  80 MICROgram(s)/formoterol 4.5 MICROgram(s) Inhaler 2 Puff(s) Inhalation two times a day  cholecalciferol 400 Unit(s) Oral daily  dextrose 5%. 1000 milliLiter(s) (50 mL/Hr) IV Continuous <Continuous>  dextrose 5%. 1000 milliLiter(s) (50 mL/Hr) IV Continuous <Continuous>  dextrose 50% Injectable 12.5 Gram(s) IV Push once  dextrose 50% Injectable 25 Gram(s) IV Push once  dextrose 50% Injectable 25 Gram(s) IV Push once  dextrose 50% Injectable 12.5 Gram(s) IV Push once  dextrose 50% Injectable 25 Gram(s) IV Push once  dextrose 50% Injectable 25 Gram(s) IV Push once  heparin  Injectable 5000 Unit(s) SubCutaneous every 8 hours  influenza   Vaccine 0.5 milliLiter(s) IntraMuscular once  insulin lispro (HumaLOG) corrective regimen sliding scale   SubCutaneous at bedtime  insulin lispro (HumaLOG) corrective regimen sliding scale   SubCutaneous three times a day before meals  losartan 100 milliGRAM(s) Oral daily  magnesium sulfate  IVPB 2 Gram(s) IV Intermittent once  nebivolol 10 milliGRAM(s) Oral daily  pantoprazole    Tablet 40 milliGRAM(s) Oral before breakfast  polyethylene glycol 3350 17 Gram(s) Oral daily  psyllium Powder 1 Packet(s) Oral daily    MEDICATIONS  (PRN):  artificial  tears Solution 1 Drop(s) Both EYES three times a day PRN Dry Eyes  dextrose 40% Gel 15 Gram(s) Oral once PRN Blood Glucose LESS THAN 70 milliGRAM(s)/deciliter  dextrose 40% Gel 15 Gram(s) Oral once PRN Blood Glucose LESS THAN 70 milliGRAM(s)/deciliter  glucagon  Injectable 1 milliGRAM(s) IntraMuscular once PRN Glucose LESS THAN 70 milligrams/deciliter  glucagon  Injectable 1 milliGRAM(s) IntraMuscular once PRN Glucose LESS THAN 70 milligrams/deciliter      Allergies    SULFA (Unknown)  sulfa drugs (Unknown)    Intolerances        Review of Systems:    General:  No wt loss, fevers, chills, night sweats, fatigue   Eyes:  Good vision, no reported pain  ENT:  No sore throat, pain, runny nose, dysphagia  CV:  No pain, palpitations, hypo/hypertension  Resp:  No dyspnea, cough, tachypnea, wheezing  GI:  No pain, No nausea, No vomiting, No diarrhea, No constipation, No weight loss, No fever, No pruritis, No rectal bleeding, No melena, No dysphagia  :  No pain, bleeding, incontinence, nocturia  Muscle:  No pain, weakness  Neuro:  No weakness, tingling, memory problems  Psych:  No fatigue, insomnia, mood problems, depression  Endocrine:  No polyuria, polydypsia, cold/heat intolerance  Heme:  No petechiae, ecchymosis, easy bruisability  Skin:  No rash, tattoos, scars, edema      Vital Signs Last 24 Hrs  T(C): 36.3 (11 Oct 2019 10:05), Max: 37 (11 Oct 2019 02:08)  T(F): 97.3 (11 Oct 2019 10:05), Max: 98.6 (11 Oct 2019 02:08)  HR: 73 (11 Oct 2019 10:05) (71 - 102)  BP: 127/75 (11 Oct 2019 10:05) (122/60 - 137/78)  BP(mean): --  RR: 32 (11 Oct 2019 10:05) (18 - 32)  SpO2: 97% (11 Oct 2019 10:05) (97% - 99%)    PHYSICAL EXAM:    Constitutional: NAD  HEENT: EOMI, throat clear  Neck: No LAD, supple  Respiratory: CTA and P  Cardiovascular: S1 and S2, RRR, no M  Gastrointestinal: BS+, soft, NT/ND, neg HSM,  Extremities: No peripheral edema, neg clubbing, cyanosis  Vascular: 2+ peripheral pulses  Neurological: A/O x 2, no focal deficits  Psychiatric: Normal mood, normal affect  Skin: No rashes      LABS:                        8.7    5.87  )-----------( 291      ( 11 Oct 2019 06:10 )             27.0     10-11    133<L>  |  102  |  11  ----------------------------<  86  3.8   |  20<L>  |  0.44<L>    Ca    8.2<L>      11 Oct 2019 06:10  Phos  3.3     10-11  Mg     1.5     10-11            RADIOLOGY & ADDITIONAL TESTS:

## 2025-07-17 NOTE — DISCHARGE NOTE PROVIDER - NSDCQMPCI_CARD_ALL_CORE
No Full range of motion with no contractures/No arthropathy/No tenderness/No erythema/No clubbing/No edema/No splints